# Patient Record
Sex: FEMALE | Race: WHITE | NOT HISPANIC OR LATINO | Employment: OTHER | URBAN - METROPOLITAN AREA
[De-identification: names, ages, dates, MRNs, and addresses within clinical notes are randomized per-mention and may not be internally consistent; named-entity substitution may affect disease eponyms.]

---

## 2017-01-10 ENCOUNTER — HOSPITAL ENCOUNTER (OUTPATIENT)
Dept: LAB | Facility: MEDICAL CENTER | Age: 62
End: 2017-01-10
Attending: FAMILY MEDICINE
Payer: COMMERCIAL

## 2017-01-10 LAB — TSH SERPL DL<=0.005 MIU/L-ACNC: 1.25 UIU/ML (ref 0.3–3.7)

## 2017-01-10 PROCEDURE — 84443 ASSAY THYROID STIM HORMONE: CPT

## 2017-01-10 PROCEDURE — 36415 COLL VENOUS BLD VENIPUNCTURE: CPT

## 2017-02-03 ENCOUNTER — HOSPITAL ENCOUNTER (OUTPATIENT)
Dept: LAB | Facility: MEDICAL CENTER | Age: 62
End: 2017-02-03
Attending: FAMILY MEDICINE
Payer: COMMERCIAL

## 2017-02-03 LAB
ANION GAP SERPL CALC-SCNC: 7 MMOL/L (ref 0–11.9)
BUN SERPL-MCNC: 11 MG/DL (ref 8–22)
CALCIUM SERPL-MCNC: 9.7 MG/DL (ref 8.5–10.5)
CHLORIDE SERPL-SCNC: 99 MMOL/L (ref 96–112)
CO2 SERPL-SCNC: 30 MMOL/L (ref 20–33)
CREAT SERPL-MCNC: 0.61 MG/DL (ref 0.5–1.4)
GLUCOSE SERPL-MCNC: 93 MG/DL (ref 65–99)
POTASSIUM SERPL-SCNC: 4 MMOL/L (ref 3.6–5.5)
SODIUM SERPL-SCNC: 136 MMOL/L (ref 135–145)

## 2017-02-03 PROCEDURE — 36415 COLL VENOUS BLD VENIPUNCTURE: CPT

## 2017-02-03 PROCEDURE — 80048 BASIC METABOLIC PNL TOTAL CA: CPT

## 2017-02-13 ENCOUNTER — HOSPITAL ENCOUNTER (OUTPATIENT)
Dept: RADIOLOGY | Facility: MEDICAL CENTER | Age: 62
End: 2017-02-13
Attending: OBSTETRICS & GYNECOLOGY
Payer: COMMERCIAL

## 2017-02-13 DIAGNOSIS — N63.0 LUMP OR MASS IN BREAST: ICD-10-CM

## 2017-02-13 PROCEDURE — 76642 ULTRASOUND BREAST LIMITED: CPT | Mod: RT

## 2017-06-15 ENCOUNTER — SLEEP CENTER VISIT (OUTPATIENT)
Dept: SLEEP MEDICINE | Facility: MEDICAL CENTER | Age: 62
End: 2017-06-15
Payer: COMMERCIAL

## 2017-06-15 VITALS
WEIGHT: 278 LBS | OXYGEN SATURATION: 93 % | HEIGHT: 66 IN | DIASTOLIC BLOOD PRESSURE: 82 MMHG | BODY MASS INDEX: 44.68 KG/M2 | SYSTOLIC BLOOD PRESSURE: 120 MMHG | HEART RATE: 68 BPM | RESPIRATION RATE: 16 BRPM

## 2017-06-15 DIAGNOSIS — E66.9 OBESITY, UNSPECIFIED OBESITY SEVERITY, UNSPECIFIED OBESITY TYPE: ICD-10-CM

## 2017-06-15 DIAGNOSIS — G47.30 SLEEP APNEA, UNSPECIFIED TYPE: ICD-10-CM

## 2017-06-15 PROCEDURE — 99213 OFFICE O/P EST LOW 20 MIN: CPT | Performed by: INTERNAL MEDICINE

## 2017-06-15 NOTE — PATIENT INSTRUCTIONS
PROBLEMS:  1. Sleep apnea, unspecified type     2. Obesity, unspecified obesity severity, unspecified obesity type           PLAN:   Has been advised to continue the current CPAP at 14 cm therapy, clean equipment frequently, and get new mask and supplies as allowed by insurance and DME. Advised about potential problems including nasal obstruction/stuffiness, mask leak or discomfort , frequent awakenings, chill or dryness of the upper airway, noise, inconvenience, and lack of benefit. Recommend an earlier appointment, if significant treatment barriers develop.     Return in about 1 year (around 6/15/2018).

## 2017-06-15 NOTE — MR AVS SNAPSHOT
"        Adelaide Pereira   6/15/2017 10:00 AM   Sleep Center Visit   MRN: [id number]    Department:  [facility]   Dept Phone:  [phone]    Description:  Female : 1955   Provider:  Louis Sosa M.D.           Reason for Visit     Follow-Up RAISA      Allergies as of 6/15/2017     Allergen Noted Reactions    Maxepa 2010       Peanut Oil 2010         You were diagnosed with     Sleep apnea, unspecified type   [5078481]       Obesity, unspecified obesity severity, unspecified obesity type   [9588529]         Vital Signs     Blood Pressure Pulse Respirations Height Weight Body Mass Index    120/82 mmHg 68 16 1.676 m (5' 5.98\") 126.1 kg (278 lb) 44.89 kg/m2    Oxygen Saturation Smoking Status                93% Never Smoker           Basic Information     Date Of Birth Sex Race Ethnicity Preferred Language    1955 Female Unknown Unknown English      Problem List              ICD-10-CM Priority Class Noted - Resolved    Hypertension I10   2010 - Present    Hypothyroidism E03.9   2010 - Present    Obesity E66.9   Unknown - Present    Goiter E04.9   Unknown - Present    Sleep apnea G47.30   Unknown - Present    Vitamin d deficiency    2012 - Present    thyroid nodules E04.9   Unknown - Present    Chronic thyroiditis E06.5   2013 - Present      Health Maintenance        Date Due Completion Dates    IMM DTaP/Tdap/Td Vaccine (1 - Tdap) 1972 ---    PAP SMEAR 1974 ---    COLONOSCOPY 2003 ---    IMM ZOSTER VACCINE 2013 ---    MAMMOGRAM 2017, 2016            Current Immunizations     No immunizations on file.      Below and/or attached are the medications your provider expects you to take. Review all of your home medications and newly ordered medications with your provider and/or pharmacist. Follow medication instructions as directed by your provider and/or pharmacist. Please keep your medication list with you and share with your provider. " Update the information when medications are discontinued, doses are changed, or new medications (including over-the-counter products) are added; and carry medication information at all times in the event of emergency situations     Allergies:  MAXEPA - (reactions not documented)     PEANUT OIL - (reactions not documented)               Medications  Valid as of: Rosy 15, 2017 - 10:14 AM    Generic Name Brand Name Tablet Size Instructions for use    EPINEPHrine   0.3 mg by Injection route.        Levothyroxine Sodium (Tab) SYNTHROID 125 MCG TAKE 1 TABLET BY MOUTH EVERY DAY        Liothyronine Sodium (Tab) CYTOMEL 5 MCG Take 1 Tab by mouth 2 Times a Day.        Progesterone Micronized (Cap) PROMETRIUM 100 MG Take 100 mg by mouth every day.          Triamterene-HCTZ (Tab) MAXZIDE 75-50 75-50 MG TAKE 1/2 TABLET BY MOUTH EVERY DAY        .                 Medicines prescribed today were sent to:     [facility] [address] [place] [address], [place] - [address] [facility]    [address] [facility] [address]    Phone: [phone] Fax: [phone]    Open 24 Hours?: No      Medication refill instructions:       If your prescription bottle indicates you have medication refills left, it is not necessary to call your provider’s office. Please contact your pharmacy and they will refill your medication.    If your prescription bottle indicates you do not have any refills left, you may request refills at any time through one of the following ways: The online Within3 system (except Urgent Care), by calling your provider’s office, or by asking your pharmacy to contact your provider’s office with a refill request. Medication refills are processed only during regular business hours and may not be available until the next business day. Your provider may request additional information or to have a follow-up visit with you prior to refilling your medication.   *Please Note: Medication refills are assigned a new Rx  number when refilled electronically. Your pharmacy may indicate that no refills were authorized even though a new prescription for the same medication is available at the pharmacy. Please request the medicine by name with the pharmacy before contacting your provider for a refill.        Instructions    PROBLEMS:  1. Sleep apnea, unspecified type     2. Obesity, unspecified obesity severity, unspecified obesity type           PLAN:   Has been advised to continue the current CPAP at 14 cm therapy, clean equipment frequently, and get new mask and supplies as allowed by insurance and DME. Advised about potential problems including nasal obstruction/stuffiness, mask leak or discomfort , frequent awakenings, chill or dryness of the upper airway, noise, inconvenience, and lack of benefit. Recommend an earlier appointment, if significant treatment barriers develop.     Return in about 1 year (around 6/15/2018).           Other Notes About Your Plan     DME:  Elan Kimble ph [phone] / fax [phone]             MyChart Access Code: Activation code not generated  Current CENTERSONIC Status: Active

## 2017-06-15 NOTE — PROGRESS NOTES
"CC: Routine follow-up for sleep apnea I'm not    HPI: 61-year-old woman returns for a compliance review. Her card from 5/16/2017 until 6/14/2017 was reviewed. Her usage was 100% of days for greater than 4 hours with an average usage of 8 hours and 7 minutes. She is currently on CPAP at 14 cm. Her residual average AHI 0.1. Her 95th percentile leak was 28 L/m. Having no issues with the mask or pressure.    Since retiring, has become a master . \"Lives\" at the gym but hasn't been able to lose weight. Dr. 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    Her prior sleep study performed October 17, 2011 showed an AHI of 36.9, REM index of 70, and a low saturation of 62%. On CPAP at 14 cm patient's apnea hypopnea index was zero, the minimum saturation 91%, and the mean saturation 92.6%.    She was last seen in June 2016 and at that time her usage was quite similar. She was on 14 centimeters water pressure at that time but her old machine did not provide a residual apnea hypopnea index. 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 Otherwise she's been doing quite well healthwise.    Patient Active Problem List    Diagnosis Date Noted   • Chronic thyroiditis 02/12/2013   • thyroid nodules    • Vitamin d deficiency 02/16/2012   • Sleep apnea    • Hypertension 02/22/2010   • Hypothyroidism 02/22/2010   • Obesity    • Goiter        Past Medical History   Diagnosis Date   • Hypothyroidism    • Hyperlipidemia    • thyroid nodules 2000     biopsy Hashimoto's thyroiditis / TPO ab neg   • Obesity    • Sleep apnea 2011     on CPAP   • Chickenpox    • Mumps    • Influenza    • Swedish measles         History reviewed. No pertinent past surgical history.    Family History   Problem Relation Age of Onset   • Thyroid Mother    • Heart Attack Father    • Sleep Apnea Father    • Sleep Apnea Brother        Social History     Social History   • Marital Status:      Spouse Name: N/A   • Number of Children: N/A   • " "Years of Education: N/A     Occupational History   • Not on file.     Social History Main Topics   • Smoking status: Never Smoker    • Smokeless tobacco: Never Used   • Alcohol Use: Yes   • Drug Use: No   • Sexual Activity: Not on file     Other Topics Concern   • Not on file     Social History Narrative       Current Outpatient Prescriptions   Medication Sig Dispense Refill   • SYNTHROID 125 MCG TABS TAKE 1 TABLET BY MOUTH EVERY DAY 30 Tab 6   • triamterene-hydrochlorothiazide (MAXZIDE 75-50) 75-50 MG TABS TAKE 1/2 TABLET BY MOUTH EVERY DAY 45 Tab 6   • progesterone (PROMETRIUM) 100 MG CAPS Take 100 mg by mouth every day.       • liothyronine (CYTOMEL) 5 MCG TABS Take 1 Tab by mouth 2 Times a Day. 60 Tab 3   • EPINEPHrine (EPIPEN INJ) 0.3 mg by Injection route.       No current facility-administered medications for this visit.    \"CURRENT RX\"    ALLERGIES: Maxepa and Peanut oil    ROS  Per history of present illness otherwise negative    PHYSICAL EXAM  Medically significant excessive weight noted.  /82 mmHg  Pulse 68  Resp 16  Ht 1.676 m (5' 5.98\")  Wt 126.1 kg (278 lb)  BMI 44.89 kg/m2  SpO2 93%  Appearance: Well-nourished, well-developed, no acute distress  Eyes:  PERRLA, EOMI  Hearing:  Grossly intact  Nose:  Normal, no lesions or deformities, turbinates moist  Oropharynx:  Tongue normal, posterior pharynx without erythema or exudate  Mallampati classification:    Neck: Supple, trachea midline, no masses  Respiratory effort:  No intercostal retractions or use of accessory muscles  Lung auscultation:  No wheezes rhonchi rubs or rales  Cardiac auscultation:  No murmurs, rubs, or gallops, no regular rhythm, normal rate  Abdomen:  No tenderness, no organomegaly  Extremities:  No cyanosis, clubbing, trace edema  Gait and Station:  Normal  Digits and nails: No clubbing, cyanosis, petechiae, or nodes  Musculoskeletal:  Grossly normal  Skin:  No rashes  Orientation:  Oriented time, place, and person  Mood " and affect:  No depression, anxiety, agitation  Judgment:  Intact    PROBLEMS:  1. Sleep apnea, unspecified type     2. Obesity, unspecified obesity severity, unspecified obesity type       3. Hypertension  4. Hypothyroidism, hx Hashimoto's Thyroiditis    PLAN:   Has been advised to continue the current CPAP at 14 cm therapy, clean equipment frequently, and get new mask and supplies as allowed by insurance and DME. Advised about potential problems including nasal obstruction/stuffiness, mask leak or discomfort , frequent awakenings, chill or dryness of the upper airway, noise, inconvenience, and lack of benefit. Recommend an earlier appointment, if significant treatment barriers develop.     Return in about 1 year (around 6/15/2018).

## 2017-06-24 ENCOUNTER — HOSPITAL ENCOUNTER (OUTPATIENT)
Facility: MEDICAL CENTER | Age: 62
End: 2017-06-24
Attending: FAMILY MEDICINE
Payer: COMMERCIAL

## 2017-06-24 ENCOUNTER — OFFICE VISIT (OUTPATIENT)
Dept: URGENT CARE | Facility: PHYSICIAN GROUP | Age: 62
End: 2017-06-24
Payer: COMMERCIAL

## 2017-06-24 VITALS
SYSTOLIC BLOOD PRESSURE: 142 MMHG | HEART RATE: 82 BPM | TEMPERATURE: 97.2 F | HEIGHT: 65 IN | WEIGHT: 288.4 LBS | OXYGEN SATURATION: 98 % | DIASTOLIC BLOOD PRESSURE: 88 MMHG | RESPIRATION RATE: 12 BRPM | BODY MASS INDEX: 48.05 KG/M2

## 2017-06-24 DIAGNOSIS — N30.01 ACUTE CYSTITIS WITH HEMATURIA: ICD-10-CM

## 2017-06-24 LAB
APPEARANCE UR: NORMAL
BILIRUB UR STRIP-MCNC: NORMAL MG/DL
COLOR UR AUTO: NORMAL
GLUCOSE UR STRIP.AUTO-MCNC: NORMAL MG/DL
KETONES UR STRIP.AUTO-MCNC: NORMAL MG/DL
LEUKOCYTE ESTERASE UR QL STRIP.AUTO: NORMAL
NITRITE UR QL STRIP.AUTO: NORMAL
PH UR STRIP.AUTO: 7 [PH] (ref 5–8)
PROT UR QL STRIP: 30 MG/DL
RBC UR QL AUTO: NORMAL
SP GR UR STRIP.AUTO: 1
UROBILINOGEN UR STRIP-MCNC: NORMAL MG/DL

## 2017-06-24 PROCEDURE — 99204 OFFICE O/P NEW MOD 45 MIN: CPT | Performed by: FAMILY MEDICINE

## 2017-06-24 PROCEDURE — 81002 URINALYSIS NONAUTO W/O SCOPE: CPT | Performed by: FAMILY MEDICINE

## 2017-06-24 PROCEDURE — 87086 URINE CULTURE/COLONY COUNT: CPT

## 2017-06-24 RX ORDER — HYDROXYZINE PAMOATE 25 MG/1
CAPSULE ORAL
Refills: 11 | COMMUNITY
Start: 2017-05-12 | End: 2020-06-22

## 2017-06-24 RX ORDER — PHENAZOPYRIDINE HYDROCHLORIDE 100 MG/1
100 TABLET, FILM COATED ORAL 3 TIMES DAILY PRN
Qty: 6 TAB | Refills: 0 | Status: SHIPPED | OUTPATIENT
Start: 2017-06-24 | End: 2020-06-22

## 2017-06-24 RX ORDER — TRIAMTERENE AND HYDROCHLOROTHIAZIDE 37.5; 25 MG/1; MG/1
TABLET ORAL DAILY
Refills: 0 | COMMUNITY
Start: 2017-05-30 | End: 2021-03-03 | Stop reason: SDUPTHER

## 2017-06-24 RX ORDER — CEFDINIR 300 MG/1
300 CAPSULE ORAL EVERY 12 HOURS
Qty: 10 CAP | Refills: 0 | Status: SHIPPED | OUTPATIENT
Start: 2017-06-24 | End: 2017-06-29

## 2017-06-24 ASSESSMENT — PAIN SCALES - GENERAL: PAINLEVEL: NO PAIN

## 2017-06-24 NOTE — MR AVS SNAPSHOT
"        Adelaide Randall   2017 4:45 PM   Office Visit   MRN: [id number]    Department:  [facility]   Dept Phone:  [phone]    Description:  Female : 1955   Provider:  Carlos Pitt M.D.           Reason for Visit     Dysuria x 1 day / Frequent urination      Allergies as of 2017     Allergen Noted Reactions    Aspirin 2017       Codeine 2017       Fish 2017       Maxepa 2010       Peanut Oil 2010         You were diagnosed with     Acute cystitis with hematuria   [483999]         Vital Signs     Blood Pressure Pulse Temperature Respirations Height Weight    142/88 mmHg 82 36.2 °C (97.2 °F) 12 1.651 m (5' 5\") 130.817 kg (288 lb 6.4 oz)    Body Mass Index Oxygen Saturation Smoking Status             47.99 kg/m2 98% Never Smoker          Basic Information     Date Of Birth Sex Race Ethnicity Preferred Language    1955 Female Unknown Unknown English      Problem List              ICD-10-CM Priority Class Noted - Resolved    Hypertension I10   2010 - Present    Hypothyroidism E03.9   2010 - Present    Obesity E66.9   Unknown - Present    Goiter E04.9   Unknown - Present    Sleep apnea G47.30   Unknown - Present    Vitamin d deficiency    2012 - Present    thyroid nodules E04.9   Unknown - Present    Chronic thyroiditis E06.5   2013 - Present      Health Maintenance        Date Due Completion Dates    IMM DTaP/Tdap/Td Vaccine (1 - Tdap) 1972 ---    PAP SMEAR 1974 ---    COLONOSCOPY 2003 ---    IMM ZOSTER VACCINE 2013 ---    MAMMOGRAM 2017, 2016            Current Immunizations     No immunizations on file.      Below and/or attached are the medications your provider expects you to take. Review all of your home medications and newly ordered medications with your provider and/or pharmacist. Follow medication instructions as directed by your provider and/or pharmacist. Please keep your medication list " with you and share with your provider. Update the information when medications are discontinued, doses are changed, or new medications (including over-the-counter products) are added; and carry medication information at all times in the event of emergency situations     Allergies:  ASPIRIN - (reactions not documented)     CODEINE - (reactions not documented)     FISH - (reactions not documented)     MAXEPA - (reactions not documented)     PEANUT OIL - (reactions not documented)               Medications  Valid as of: June 24, 2017 -  5:08 PM    Generic Name Brand Name Tablet Size Instructions for use    Cefdinir (Cap) OMNICEF 300 MG Take 1 Cap by mouth every 12 hours for 5 days.        EPINEPHrine   0.3 mg by Injection route.        HydrOXYzine Pamoate (Cap) VISTARIL 25 MG         Levothyroxine Sodium (Tab) SYNTHROID 125 MCG TAKE 1 TABLET BY MOUTH EVERY DAY        Liothyronine Sodium (Tab) CYTOMEL 5 MCG Take 1 Tab by mouth 2 Times a Day.        Phenazopyridine HCl (Tab) PYRIDIUM 100 MG Take 1 Tab by mouth 3 times a day as needed.        Progesterone Micronized (Cap) PROMETRIUM 100 MG Take 100 mg by mouth every day.          Triamterene-HCTZ (Tab) MAXZIDE 75-50 75-50 MG TAKE 1/2 TABLET BY MOUTH EVERY DAY        Triamterene-HCTZ (Tab) MAXZIDE-25/DYAZIDE 37.5-25 MG         .                 Medicines prescribed today were sent to:     [facility] [address] [place] [address] AT [facility] & [address] [address]    Phone: [phone] Fax: [phone]    Open 24 Hours?: No      Medication refill instructions:       If your prescription bottle indicates you have medication refills left, it is not necessary to call your provider’s office. Please contact your pharmacy and they will refill your medication.    If your prescription bottle indicates you do not have any refills left, you may request refills at any time through one of the following ways: The online GOSO system  (except Urgent Care), by calling your provider’s office, or by asking your pharmacy to contact your provider’s office with a refill request. Medication refills are processed only during regular business hours and may not be available until the next business day. Your provider may request additional information or to have a follow-up visit with you prior to refilling your medication.   *Please Note: Medication refills are assigned a new Rx number when refilled electronically. Your pharmacy may indicate that no refills were authorized even though a new prescription for the same medication is available at the pharmacy. Please request the medicine by name with the pharmacy before contacting your provider for a refill.        Your To Do List     Future Labs/Procedures Complete By Expires    Urine Culture  As directed 6/24/2018      Instructions      Urinary Tract Infection  A urinary tract infection (UTI) can occur any place along the urinary tract. The tract includes the kidneys, ureters, bladder, and urethra. A type of germ called bacteria often causes a UTI. UTIs are often helped with antibiotic medicine.   HOME CARE   · If given, take antibiotics as told by your doctor. Finish them even if you start to feel better.  · Drink enough fluids to keep your pee (urine) clear or pale yellow.  · Avoid tea, drinks with caffeine, and bubbly (carbonated) drinks.  · Pee often. Avoid holding your pee in for a long time.  · Pee before and after having sex (intercourse).  · Wipe from front to back after you poop (bowel movement) if you are a woman. Use each tissue only once.  GET HELP RIGHT AWAY IF:   · You have back pain.  · You have lower belly (abdominal) pain.  · You have chills.  · You feel sick to your stomach (nauseous).  · You throw up (vomit).  · Your burning or discomfort with peeing does not go away.  · You have a fever.  · Your symptoms are not better in 3 days.  MAKE SURE YOU:   · Understand these instructions.  · Will  watch your condition.  · Will get help right away if you are not doing well or get worse.     This information is not intended to replace advice given to you by your health care provider. Make sure you discuss any questions you have with your health care provider.     Document Released: 06/05/2009 Document Revised: 01/08/2016 Document Reviewed: 07/18/2013  [employer] Interactive Patient Education ©2016 [employer]         Other Notes About Your Plan     DME:  Elan  / deng [phone] / fax [phone]             MyChart Access Code: Activation code not generated  Current mNectar Status: Active

## 2017-06-24 NOTE — PATIENT INSTRUCTIONS
Urinary Tract Infection  A urinary tract infection (UTI) can occur any place along the urinary tract. The tract includes the kidneys, ureters, bladder, and urethra. A type of germ called bacteria often causes a UTI. UTIs are often helped with antibiotic medicine.   HOME CARE   · If given, take antibiotics as told by your doctor. Finish them even if you start to feel better.  · Drink enough fluids to keep your pee (urine) clear or pale yellow.  · Avoid tea, drinks with caffeine, and bubbly (carbonated) drinks.  · Pee often. Avoid holding your pee in for a long time.  · Pee before and after having sex (intercourse).  · Wipe from front to back after you poop (bowel movement) if you are a woman. Use each tissue only once.  GET HELP RIGHT AWAY IF:   · You have back pain.  · You have lower belly (abdominal) pain.  · You have chills.  · You feel sick to your stomach (nauseous).  · You throw up (vomit).  · Your burning or discomfort with peeing does not go away.  · You have a fever.  · Your symptoms are not better in 3 days.  MAKE SURE YOU:   · Understand these instructions.  · Will watch your condition.  · Will get help right away if you are not doing well or get worse.     This information is not intended to replace advice given to you by your health care provider. Make sure you discuss any questions you have with your health care provider.     Document Released: 06/05/2009 Document Revised: 01/08/2016 Document Reviewed: 07/18/2013  [employer] Interactive Patient Education ©2016 [employer]

## 2017-06-26 DIAGNOSIS — N30.01 ACUTE CYSTITIS WITH HEMATURIA: ICD-10-CM

## 2017-06-28 LAB
BACTERIA UR CULT: NORMAL
SIGNIFICANT IND 70042: NORMAL
SOURCE SOURCE: NORMAL

## 2017-07-05 ENCOUNTER — HOSPITAL ENCOUNTER (OUTPATIENT)
Dept: LAB | Facility: MEDICAL CENTER | Age: 62
End: 2017-07-05
Attending: PHYSICIAN ASSISTANT
Payer: COMMERCIAL

## 2017-07-05 ENCOUNTER — HOSPITAL ENCOUNTER (OUTPATIENT)
Facility: MEDICAL CENTER | Age: 62
End: 2017-07-05
Attending: NURSE PRACTITIONER
Payer: COMMERCIAL

## 2017-07-05 ENCOUNTER — OFFICE VISIT (OUTPATIENT)
Dept: URGENT CARE | Facility: PHYSICIAN GROUP | Age: 62
End: 2017-07-05
Payer: COMMERCIAL

## 2017-07-05 VITALS
WEIGHT: 278 LBS | BODY MASS INDEX: 44.68 KG/M2 | TEMPERATURE: 98.1 F | SYSTOLIC BLOOD PRESSURE: 138 MMHG | HEART RATE: 74 BPM | HEIGHT: 66 IN | OXYGEN SATURATION: 94 % | DIASTOLIC BLOOD PRESSURE: 92 MMHG

## 2017-07-05 DIAGNOSIS — R35.0 URINARY FREQUENCY: ICD-10-CM

## 2017-07-05 DIAGNOSIS — R30.0 DYSURIA: ICD-10-CM

## 2017-07-05 DIAGNOSIS — N30.01 ACUTE CYSTITIS WITH HEMATURIA: ICD-10-CM

## 2017-07-05 LAB
ALBUMIN SERPL BCP-MCNC: 3.9 G/DL (ref 3.2–4.9)
ALBUMIN/GLOB SERPL: 1.1 G/DL
ALP SERPL-CCNC: 86 U/L (ref 30–99)
ALT SERPL-CCNC: 22 U/L (ref 2–50)
ANION GAP SERPL CALC-SCNC: 10 MMOL/L (ref 0–11.9)
APPEARANCE UR: CLEAR
AST SERPL-CCNC: 19 U/L (ref 12–45)
BILIRUB SERPL-MCNC: 0.8 MG/DL (ref 0.1–1.5)
BILIRUB UR STRIP-MCNC: NORMAL MG/DL
BUN SERPL-MCNC: 8 MG/DL (ref 8–22)
CALCIUM SERPL-MCNC: 9.3 MG/DL (ref 8.5–10.5)
CHLORIDE SERPL-SCNC: 99 MMOL/L (ref 96–112)
CO2 SERPL-SCNC: 27 MMOL/L (ref 20–33)
COLOR UR AUTO: YELLOW
CREAT SERPL-MCNC: 0.49 MG/DL (ref 0.5–1.4)
GFR SERPL CREATININE-BSD FRML MDRD: >60 ML/MIN/1.73 M 2
GLOBULIN SER CALC-MCNC: 3.6 G/DL (ref 1.9–3.5)
GLUCOSE SERPL-MCNC: 77 MG/DL (ref 65–99)
GLUCOSE UR STRIP.AUTO-MCNC: NORMAL MG/DL
KETONES UR STRIP.AUTO-MCNC: NORMAL MG/DL
LEUKOCYTE ESTERASE UR QL STRIP.AUTO: NORMAL
NITRITE UR QL STRIP.AUTO: NORMAL
PH UR STRIP.AUTO: 7 [PH] (ref 5–8)
POTASSIUM SERPL-SCNC: 3.8 MMOL/L (ref 3.6–5.5)
PROT SERPL-MCNC: 7.5 G/DL (ref 6–8.2)
PROT UR QL STRIP: NORMAL MG/DL
RBC UR QL AUTO: NORMAL
SODIUM SERPL-SCNC: 136 MMOL/L (ref 135–145)
SP GR UR STRIP.AUTO: 1
TSH SERPL DL<=0.005 MIU/L-ACNC: 1.09 UIU/ML (ref 0.3–3.7)
UROBILINOGEN UR STRIP-MCNC: NORMAL MG/DL

## 2017-07-05 PROCEDURE — 87186 SC STD MICRODIL/AGAR DIL: CPT

## 2017-07-05 PROCEDURE — 87086 URINE CULTURE/COLONY COUNT: CPT

## 2017-07-05 PROCEDURE — 81002 URINALYSIS NONAUTO W/O SCOPE: CPT | Performed by: NURSE PRACTITIONER

## 2017-07-05 PROCEDURE — 99214 OFFICE O/P EST MOD 30 MIN: CPT | Performed by: NURSE PRACTITIONER

## 2017-07-05 PROCEDURE — 84443 ASSAY THYROID STIM HORMONE: CPT

## 2017-07-05 PROCEDURE — 36415 COLL VENOUS BLD VENIPUNCTURE: CPT

## 2017-07-05 PROCEDURE — 80053 COMPREHEN METABOLIC PANEL: CPT

## 2017-07-05 PROCEDURE — 87077 CULTURE AEROBIC IDENTIFY: CPT

## 2017-07-05 RX ORDER — NITROFURANTOIN 25; 75 MG/1; MG/1
100 CAPSULE ORAL EVERY 12 HOURS
Qty: 10 CAP | Refills: 0 | Status: SHIPPED | OUTPATIENT
Start: 2017-07-05 | End: 2017-07-10

## 2017-07-05 ASSESSMENT — ENCOUNTER SYMPTOMS
CHILLS: 0
VOMITING: 0
FEVER: 0
PSYCHIATRIC NEGATIVE: 1
NAUSEA: 0
FLANK PAIN: 0
NEUROLOGICAL NEGATIVE: 1
MUSCULOSKELETAL NEGATIVE: 1

## 2017-07-05 NOTE — MR AVS SNAPSHOT
"        Adelaide Pereira   2017 8:15 AM   Office Visit   MRN: [id number]    Department:  [facility]   Dept Phone:  [phone]    Description:  Female : 1955   Provider:  Cathey J Hamman, A.P.N.           Reason for Visit     Dysuria x 5 days       Allergies as of 2017     Allergen Noted Reactions    Aspirin 2017       Codeine 2017       Fish 2017       Maxepa 2010       Peanut Oil 2010         You were diagnosed with     Acute cystitis with hematuria   [755374]       Dysuria   [788.1.ICD-9-CM]       Urinary frequency   [788.41.ICD-9-CM]         Vital Signs     Blood Pressure Pulse Temperature Height Weight Body Mass Index    138/92 mmHg 74 36.7 °C (98.1 °F) 1.676 m (5' 6\") 126.1 kg (278 lb) 44.89 kg/m2    Oxygen Saturation Smoking Status                94% Never Smoker           Basic Information     Date Of Birth Sex Race Ethnicity Preferred Language    1955 Female Unknown Unknown English      Problem List              ICD-10-CM Priority Class Noted - Resolved    Hypertension I10   2010 - Present    Hypothyroidism E03.9   2010 - Present    Obesity E66.9   Unknown - Present    Goiter E04.9   Unknown - Present    Sleep apnea G47.30   Unknown - Present    Vitamin d deficiency    2012 - Present    thyroid nodules E04.9   Unknown - Present    Chronic thyroiditis E06.5   2013 - Present      Health Maintenance        Date Due Completion Dates    IMM DTaP/Tdap/Td Vaccine (1 - Tdap) 1972 ---    PAP SMEAR 1974 ---    COLONOSCOPY 2003 ---    IMM ZOSTER VACCINE 2013 ---    MAMMOGRAM 2017, 2016    IMM INFLUENZA (1) 2017 ---            Results     POCT Urinalysis      Component Value Standard Range & Units    POC Color YELLOW Negative    POC Appearance CLEAR Negative    POC Leukocyte Esterase LARGE Negative    POC Nitrites NEG Negative    POC Urobiligen NEG Negative (0.2) mg/dL    POC Protein NEG Negative mg/dL   "    POC Urine PH 7.0 5.0 - 8.0    POC Blood HEMOLYZED Negative    POC Specific Gravity 1.005 <1.005 - >1.030    POC Ketones NEG Negative mg/dL    POC Biliruben NEG Negative mg/dL    POC Glucose NEG Negative mg/dL                        Current Immunizations     No immunizations on file.      Below and/or attached are the medications your provider expects you to take. Review all of your home medications and newly ordered medications with your provider and/or pharmacist. Follow medication instructions as directed by your provider and/or pharmacist. Please keep your medication list with you and share with your provider. Update the information when medications are discontinued, doses are changed, or new medications (including over-the-counter products) are added; and carry medication information at all times in the event of emergency situations     Allergies:  ASPIRIN - (reactions not documented)     CODEINE - (reactions not documented)     FISH - (reactions not documented)     MAXEPA - (reactions not documented)     PEANUT OIL - (reactions not documented)               Medications  Valid as of: July 05, 2017 -  9:58 AM    Generic Name Brand Name Tablet Size Instructions for use    EPINEPHrine   0.3 mg by Injection route.        HydrOXYzine Pamoate (Cap) VISTARIL 25 MG         Levothyroxine Sodium (Tab) SYNTHROID 125 MCG TAKE 1 TABLET BY MOUTH EVERY DAY        Liothyronine Sodium (Tab) CYTOMEL 5 MCG Take 1 Tab by mouth 2 Times a Day.        Nitrofurantoin Monohyd Macro (Cap) MACROBID 100 MG Take 1 Cap by mouth every 12 hours for 5 days.        Phenazopyridine HCl (Tab) PYRIDIUM 100 MG Take 1 Tab by mouth 3 times a day as needed.        Progesterone Micronized (Cap) PROMETRIUM 100 MG Take 100 mg by mouth every day.          Triamterene-HCTZ (Tab) MAXZIDE 75-50 75-50 MG TAKE 1/2 TABLET BY MOUTH EVERY DAY        Triamterene-HCTZ (Tab) MAXZIDE-25/DYAZIDE 37.5-25 MG         .                 Medicines prescribed today were  sent to:     [facility] - [place], [place] - [address] AT [facility] & [address]    [address] [place] [address]    Phone: [phone] Fax: [phone]    Open 24 Hours?: No      Medication refill instructions:       If your prescription bottle indicates you have medication refills left, it is not necessary to call your provider’s office. Please contact your pharmacy and they will refill your medication.    If your prescription bottle indicates you do not have any refills left, you may request refills at any time through one of the following ways: The online SnackFeed system (except Urgent Care), by calling your provider’s office, or by asking your pharmacy to contact your provider’s office with a refill request. Medication refills are processed only during regular business hours and may not be available until the next business day. Your provider may request additional information or to have a follow-up visit with you prior to refilling your medication.   *Please Note: Medication refills are assigned a new Rx number when refilled electronically. Your pharmacy may indicate that no refills were authorized even though a new prescription for the same medication is available at the pharmacy. Please request the medicine by name with the pharmacy before contacting your provider for a refill.        Your To Do List     Future Labs/Procedures Complete By Expires    Urine Culture  As directed 7/5/2018      Other Notes About Your Plan     DME:  Elan  / deng [phone] / fax [phone]             Pelotonicshart Access Code: Activation code not generated  Current SnackFeed Status: Active

## 2017-07-05 NOTE — PROGRESS NOTES
Subjective:      Adelaide Pereira is a 61 y.o. female who presents with Dysuria    Past Medical History   Diagnosis Date   • Hypothyroidism    • Hyperlipidemia    • thyroid nodules 2000     biopsy Hashimoto's thyroiditis / TPO ab neg   • Obesity    • Sleep apnea 2011     on CPAP   • Chickenpox    • Mumps    • Influenza    • [place] measles      Social History     Social History   • Marital Status:      Spouse Name: N/A   • Number of Children: N/A   • Years of Education: N/A     Occupational History   • Not on file.     Social History Main Topics   • Smoking status: Never Smoker    • Smokeless tobacco: Never Used   • Alcohol Use: Yes   • Drug Use: No   • Sexual Activity: Not on file     Other Topics Concern   • Not on file     Social History Narrative     Family History   Problem Relation Age of Onset   • Thyroid Mother    • Heart Attack Father    • Sleep Apnea Father    • Sleep Apnea Brother      Allergies: Aspirin; Codeine; Fish; Maxepa; and Peanut oil     The patient is a 60-year-old female who presents today with complaint of dysuria, urgency, and frequency. She was seen in urgent care on June 24 with the same symptoms. She was treated with Omnicef. Her urine culture was negative. She did report responding to the antibiotic however her symptoms returned over the last few days. She denies any lower back pain, fever, aches, or chills.        Dysuria   This is a recurrent problem. The current episode started in the past 7 days. The problem occurs every urination. The problem has been gradually worsening. The quality of the pain is described as shooting, burning and aching. The pain is mild. There has been no fever. Associated symptoms include frequency, hematuria and urgency. Pertinent negatives include no chills, discharge, flank pain, nausea or vomiting. She has tried nothing for the symptoms. The treatment provided no relief.       Review of Systems   Constitutional: Negative for fever, chills and  "malaise/fatigue.   Gastrointestinal: Negative for nausea and vomiting.   Genitourinary: Positive for dysuria, urgency, frequency and hematuria. Negative for flank pain.   Musculoskeletal: Negative.    Skin: Negative.    Neurological: Negative.    Psychiatric/Behavioral: Negative.      All other systems reviewed and are negative      Objective:     /92 mmHg  Pulse 74  Temp(Src) 36.7 °C (98.1 °F)  Ht 1.676 m (5' 6\")  Wt 126.1 kg (278 lb)  BMI 44.89 kg/m2  SpO2 94%     Physical Exam   Constitutional: She is oriented to person, place, and time. She appears well-developed and well-nourished. No distress.   Neck: Normal range of motion. Neck supple.   Cardiovascular: Normal rate and regular rhythm.    Pulmonary/Chest: Effort normal and breath sounds normal.   Abdominal: Soft. Bowel sounds are normal.   No CVA tenderness  Positive suprapubic tenderness    Musculoskeletal: Normal range of motion.   Neurological: She is alert and oriented to person, place, and time.   Skin: Skin is warm and dry. She is not diaphoretic.   Psychiatric: She has a normal mood and affect. Her behavior is normal.   Vitals reviewed.           UA: positive leukocytes, positive blood      Assessment/Plan:   Cystitis  UTI  -macrobid  -push fluids  -urine culture  -follow up if symptoms persist or worsen  -patient has follow up scheduled with her PCP and she is advised to keep that appointmetn    There are no diagnoses linked to this encounter.      "

## 2017-07-06 ASSESSMENT — ENCOUNTER SYMPTOMS
NAUSEA: 0
DIZZINESS: 0
FEVER: 0
MYALGIAS: 0
VOMITING: 0
CHILLS: 0
SHORTNESS OF BREATH: 0
EYE PAIN: 0
SORE THROAT: 0
FLANK PAIN: 0

## 2017-07-06 NOTE — PROGRESS NOTES
Subjective:      Adelaide Pereira is a 61 y.o. female who presents with Dysuria            Dysuria   This is a new problem. The current episode started yesterday. The problem occurs every urination. The problem has been rapidly worsening. The quality of the pain is described as burning. The pain is mild. Pertinent negatives include no chills, flank pain, hematuria, nausea or vomiting.       Review of Systems   Constitutional: Negative for fever and chills.   HENT: Negative for sore throat.    Eyes: Negative for pain.   Respiratory: Negative for shortness of breath.    Cardiovascular: Negative for chest pain.   Gastrointestinal: Negative for nausea and vomiting.   Genitourinary: Positive for dysuria. Negative for hematuria and flank pain.   Musculoskeletal: Negative for myalgias.   Skin: Negative for rash.   Neurological: Negative for dizziness.     PMH:  has a past medical history of Hypothyroidism; Hyperlipidemia; thyroid nodules (2000); Obesity; Sleep apnea (2011); Chickenpox; Mumps; Influenza; and Gabonese measles.  MEDS:   Current outpatient prescriptions:   •  phenazopyridine (PYRIDIUM) 100 MG Tab, Take 1 Tab by mouth 3 times a day as needed., Disp: 6 Tab, Rfl: 0  •  progesterone (PROMETRIUM) 100 MG CAPS, Take 100 mg by mouth every day.  , Disp: , Rfl:   •  nitrofurantoin monohydr macro (MACROBID) 100 MG Cap, Take 1 Cap by mouth every 12 hours for 5 days., Disp: 10 Cap, Rfl: 0  •  triamterene-hctz (MAXZIDE-25/DYAZIDE) 37.5-25 MG Tab, , Disp: , Rfl: 0  •  hydrOXYzine (VISTARIL) 25 MG Cap, , Disp: , Rfl: 11  •  SYNTHROID 125 MCG TABS, TAKE 1 TABLET BY MOUTH EVERY DAY, Disp: 30 Tab, Rfl: 6  •  liothyronine (CYTOMEL) 5 MCG TABS, Take 1 Tab by mouth 2 Times a Day., Disp: 60 Tab, Rfl: 3  •  triamterene-hydrochlorothiazide (MAXZIDE 75-50) 75-50 MG TABS, TAKE 1/2 TABLET BY MOUTH EVERY DAY, Disp: 45 Tab, Rfl: 6  •  EPINEPHrine (EPIPEN INJ), 0.3 mg by Injection route., Disp: , Rfl:   ALLERGIES:   Allergies   Allergen  "Reactions   • Aspirin    • Codeine    • Fish    • Maxepa    • Peanut Oil      SURGHX:   Past Surgical History   Procedure Laterality Date   • Primary c section       SOCHX:  reports that she has never smoked. She has never used smokeless tobacco. She reports that she drinks alcohol. She reports that she does not use illicit drugs.  FH: family history includes Heart Attack in her father; Sleep Apnea in her brother and father; Thyroid in her mother.      Objective:     /88 mmHg  Pulse 82  Temp(Src) 36.2 °C (97.2 °F)  Resp 12  Ht 1.651 m (5' 5\")  Wt 130.817 kg (288 lb 6.4 oz)  BMI 47.99 kg/m2  SpO2 98%     Physical Exam   Constitutional: She is oriented to person, place, and time. She appears well-developed and well-nourished. No distress.   HENT:   Head: Normocephalic and atraumatic.   Eyes: Conjunctivae and EOM are normal. Pupils are equal, round, and reactive to light.   Cardiovascular: Normal rate, regular rhythm, normal heart sounds and intact distal pulses.    No murmur heard.  Pulmonary/Chest: Effort normal and breath sounds normal. No respiratory distress.   Abdominal: Soft. Bowel sounds are normal. She exhibits no distension. There is tenderness in the suprapubic area. There is no CVA tenderness.   Musculoskeletal: Normal range of motion.   Neurological: She is alert and oriented to person, place, and time. She has normal reflexes. No sensory deficit.   Skin: Skin is warm and dry.   Psychiatric: She has a normal mood and affect. Her behavior is normal.   Vitals reviewed.              Assessment/Plan:     1. Acute cystitis with hematuria [N30.01]  Differential diagnosis, natural history, supportive care, and indications for immediate follow-up discussed.   - POCT Urinalysis  - Urine Culture; Future  - cefdinir (OMNICEF) 300 MG Cap; Take 1 Cap by mouth every 12 hours for 5 days.  Dispense: 10 Cap; Refill: 0  - phenazopyridine (PYRIDIUM) 100 MG Tab; Take 1 Tab by mouth 3 times a day as needed.  " Dispense: 6 Tab; Refill: 0

## 2017-07-07 LAB
BACTERIA UR CULT: ABNORMAL
BACTERIA UR CULT: ABNORMAL
SIGNIFICANT IND 70042: ABNORMAL
SOURCE SOURCE: ABNORMAL

## 2017-09-14 ENCOUNTER — HOSPITAL ENCOUNTER (OUTPATIENT)
Dept: RADIOLOGY | Facility: MEDICAL CENTER | Age: 62
End: 2017-09-14
Attending: OBSTETRICS & GYNECOLOGY
Payer: COMMERCIAL

## 2017-09-14 DIAGNOSIS — Z12.31 ENCOUNTER FOR SCREENING MAMMOGRAM FOR MALIGNANT NEOPLASM OF BREAST: ICD-10-CM

## 2017-09-14 PROCEDURE — G0202 SCR MAMMO BI INCL CAD: HCPCS

## 2017-11-10 ENCOUNTER — HOSPITAL ENCOUNTER (OUTPATIENT)
Dept: LAB | Facility: MEDICAL CENTER | Age: 62
End: 2017-11-10
Attending: NURSE PRACTITIONER
Payer: COMMERCIAL

## 2017-11-10 PROCEDURE — 87624 HPV HI-RISK TYP POOLED RSLT: CPT

## 2017-11-10 PROCEDURE — 88175 CYTOPATH C/V AUTO FLUID REDO: CPT

## 2017-11-14 LAB
CYTOLOGY REG CYTOL: NORMAL
HPV HR 12 DNA CVX QL NAA+PROBE: NEGATIVE
HPV16 DNA SPEC QL NAA+PROBE: NEGATIVE
HPV18 DNA SPEC QL NAA+PROBE: NEGATIVE
SPECIMEN SOURCE: NORMAL

## 2017-12-06 ENCOUNTER — TELEPHONE (OUTPATIENT)
Dept: SLEEP MEDICINE | Facility: MEDICAL CENTER | Age: 62
End: 2017-12-06

## 2017-12-06 DIAGNOSIS — G47.33 OSA (OBSTRUCTIVE SLEEP APNEA): ICD-10-CM

## 2017-12-06 NOTE — TELEPHONE ENCOUNTER
Pt needs updated supplies to DME:  [facility]  / deng [phone] / fax [phone]   Due back 06/2018 Please sign order

## 2018-01-03 ENCOUNTER — HOSPITAL ENCOUNTER (OUTPATIENT)
Dept: LAB | Facility: MEDICAL CENTER | Age: 63
End: 2018-01-03
Attending: FAMILY MEDICINE
Payer: COMMERCIAL

## 2018-01-03 LAB
ANION GAP SERPL CALC-SCNC: 8 MMOL/L (ref 0–11.9)
BUN SERPL-MCNC: 10 MG/DL (ref 8–22)
CALCIUM SERPL-MCNC: 9.2 MG/DL (ref 8.5–10.5)
CHLORIDE SERPL-SCNC: 99 MMOL/L (ref 96–112)
CO2 SERPL-SCNC: 26 MMOL/L (ref 20–33)
CREAT SERPL-MCNC: 0.6 MG/DL (ref 0.5–1.4)
GFR SERPL CREATININE-BSD FRML MDRD: >60 ML/MIN/1.73 M 2
GLUCOSE SERPL-MCNC: 85 MG/DL (ref 65–99)
POTASSIUM SERPL-SCNC: 4 MMOL/L (ref 3.6–5.5)
SODIUM SERPL-SCNC: 133 MMOL/L (ref 135–145)
TSH SERPL DL<=0.005 MIU/L-ACNC: 1.03 UIU/ML (ref 0.38–5.33)

## 2018-01-03 PROCEDURE — 80048 BASIC METABOLIC PNL TOTAL CA: CPT

## 2018-01-03 PROCEDURE — 36415 COLL VENOUS BLD VENIPUNCTURE: CPT

## 2018-01-03 PROCEDURE — 84443 ASSAY THYROID STIM HORMONE: CPT

## 2018-05-21 ENCOUNTER — HOSPITAL ENCOUNTER (OUTPATIENT)
Dept: RADIOLOGY | Facility: MEDICAL CENTER | Age: 63
End: 2018-05-21
Attending: FAMILY MEDICINE
Payer: COMMERCIAL

## 2018-05-21 DIAGNOSIS — E01.0 THYROMEGALY: ICD-10-CM

## 2018-05-21 PROCEDURE — 76536 US EXAM OF HEAD AND NECK: CPT

## 2018-06-28 ENCOUNTER — SLEEP CENTER VISIT (OUTPATIENT)
Dept: SLEEP MEDICINE | Facility: MEDICAL CENTER | Age: 63
End: 2018-06-28
Payer: COMMERCIAL

## 2018-06-28 VITALS
OXYGEN SATURATION: 94 % | HEART RATE: 72 BPM | DIASTOLIC BLOOD PRESSURE: 88 MMHG | SYSTOLIC BLOOD PRESSURE: 138 MMHG | BODY MASS INDEX: 43.23 KG/M2 | RESPIRATION RATE: 18 BRPM | WEIGHT: 269 LBS | HEIGHT: 66 IN

## 2018-06-28 DIAGNOSIS — E04.9 GOITER: ICD-10-CM

## 2018-06-28 DIAGNOSIS — E66.01 MORBID OBESITY WITH BMI OF 40.0-44.9, ADULT (HCC): ICD-10-CM

## 2018-06-28 DIAGNOSIS — E03.8 HYPOTHYROIDISM DUE TO HASHIMOTO'S THYROIDITIS: ICD-10-CM

## 2018-06-28 DIAGNOSIS — E06.3 HYPOTHYROIDISM DUE TO HASHIMOTO'S THYROIDITIS: ICD-10-CM

## 2018-06-28 DIAGNOSIS — Z78.9 NON-SMOKER: ICD-10-CM

## 2018-06-28 DIAGNOSIS — I10 ESSENTIAL HYPERTENSION: ICD-10-CM

## 2018-06-28 DIAGNOSIS — G47.30 SLEEP APNEA, UNSPECIFIED TYPE: ICD-10-CM

## 2018-06-28 PROCEDURE — 99214 OFFICE O/P EST MOD 30 MIN: CPT | Performed by: INTERNAL MEDICINE

## 2018-06-28 NOTE — PROGRESS NOTES
"CC: Follow up for clinical and compliance review      HPI:  Adelaide Pereira is a 61 y/o F who was last seen 6/15/17.    She will be switching to [facility] and fall and therefore does not need mask and supplies orders because she has enough to tide her over until the fall.  She will call when she needs new supplies.    She is not having any undue problems with mask fit, leak, pressure tolerance, or excessive airway pressure.    Her 30 day compliance shows usage for 100% of the last 30 days for an average of 8 hours and 14 minutes on CPAP set at 14 cm H2O.  Her average apnea hypopnea index of 0.1.  Her 95th percentile leak is 34.2 L/min.    Very active, works out many times a week    Her note from 6/15/2017:  Her card from 5/16/2017 until 6/14/2017 was reviewed. Her usage was 100% of days for greater than 4 hours with an average usage of 8 hours and 7 minutes. She is currently on CPAP at 14 cm. Her residual average AHI 0.1. Her 95th percentile leak was 28 L/m. Having no issues with the mask or pressure.     Since retiring, has become a master . \"Lives\" at the gym but hasn't been able to lose weight. Dr. 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     Her prior sleep study performed October 17, 2011 showed an AHI of 36.9, REM index of 70, and a low saturation of 62%. On CPAP at 14 cm patient's apnea hypopnea index was zero, the minimum saturation 91%, and the mean saturation 92.6%.     She was last seen in June 2016 and at that time her usage was quite similar. She was on 14 centimeters water pressure at that time but her old machine did not provide a residual apnea hypopnea index. 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 Otherwise she's been doing quite well healthwise.      Her significant comorbidities and  Modifying factors include hypertension, hypothyroidism, obesity, vitamin D deficiency, and chronic thyroiditis      Patient Active Problem List    Diagnosis Date Noted   • " "Morbid obesity with BMI of 40.0-44.9, adult ([facility]) 06/28/2018   • Chronic thyroiditis 02/12/2013   • thyroid nodules    • Vitamin d deficiency 02/16/2012   • Sleep apnea    • Hypertension 02/22/2010   • Hypothyroidism 02/22/2010   • Obesity    • Goiter        Past Medical History:   Diagnosis Date   • Chickenpox    • Frisian measles    • Hyperlipidemia    • Hypothyroidism    • Influenza    • Mumps    • Obesity    • Sleep apnea 2011    on CPAP   • thyroid nodules 2000    biopsy Hashimoto's thyroiditis / TPO ab neg        Past Surgical History:   Procedure Laterality Date   • PRIMARY C SECTION         Family History   Problem Relation Age of Onset   • Thyroid Mother    • Heart Attack Father    • Sleep Apnea Father    • Sleep Apnea Brother        Social History     Social History   • Marital status:      Spouse name: N/A   • Number of children: N/A   • Years of education: N/A     Occupational History   • Not on file.     Social History Main Topics   • Smoking status: Never Smoker   • Smokeless tobacco: Never Used   • Alcohol use Yes   • Drug use: No   • Sexual activity: Not on file     Other Topics Concern   • Not on file     Social History Narrative   • No narrative on file       Current Outpatient Prescriptions   Medication Sig Dispense Refill   • triamterene-hctz (MAXZIDE-25/DYAZIDE) 37.5-25 MG Tab   0   • SYNTHROID 125 MCG TABS TAKE 1 TABLET BY MOUTH EVERY DAY 30 Tab 6   • progesterone (PROMETRIUM) 100 MG CAPS Take 100 mg by mouth every day.       • hydrOXYzine (VISTARIL) 25 MG Cap   11   • phenazopyridine (PYRIDIUM) 100 MG Tab Take 1 Tab by mouth 3 times a day as needed. 6 Tab 0   • liothyronine (CYTOMEL) 5 MCG TABS Take 1 Tab by mouth 2 Times a Day. 60 Tab 3   • triamterene-hydrochlorothiazide (MAXZIDE 75-50) 75-50 MG TABS TAKE 1/2 TABLET BY MOUTH EVERY DAY 45 Tab 6   • EPINEPHrine (EPIPEN INJ) 0.3 mg by Injection route.       No current facility-administered medications for this visit.     \"CURRENT " "RX\"    ALLERGIES: Aspirin; Codeine; Fish; Maxepa; and Peanut oil    ROS  Constitutional: denies fever chills sweats fatigue and weight loss  Eyes: denies vision loss, pain, drainage, double vision, glasses  Ears, eyes, nose, mouth, throat: denies earache, difficulty hearing, injury, recurrence or throat, persistent hoarseness, decay teeth, toothaches; she does have ringing and buzzing in her ears at times and rhinitis and nasal congestion; lots of allergies. Nodules are getting slightly bigger.  Cardiovascular: denies chest pain, tightness, palpitations, swelling of legs and feet, fainting, difficulty breathing  Respiratory: see HPI   Sleep:see HPI  GI: denies heartburn, difficulty swallowing, nausea, abdominal pain, diarrhea, constipation  : denies issues; her last period was in 2013.  Musculoskeletal: denies painful joints, sore muscles, back pain  Integumentary: denies rashes, lumps, color change  Neuro: denies frequent headaches, dizziness, weakness      PHYSICAL EXAM  MSEW    /88   Pulse 72   Resp 18   Ht 1.676 m (5' 6\")   Wt 122 kg (269 lb) Comment: pt reported  SpO2 94%   BMI 43.42 kg/m²   Appearance: Well-nourished, well-developed, no acute distress  Eyes:  PERRLA, EOMI  ENMT: without lesions, deformities;hearing grossly intact; tongue normal, posterior pharynx without erythema or exudate; Mallampati classification:   Neck: Supple, trachea midline, no masses  Respiratory effort:  No intercostal retractions or use of accessory muscles  Lung auscultation:  No wheezes rhonchi rubs or rales  Cardiac: No murmurs, rubs, or gallops; regular rhythm, normal rate; no edema  Abdomen:  No tenderness, no organomegaly  Musculoskeletal:  Grossly normal; gait and station normal; digits and nails normal  Skin:  No rashes, petechiae, cyanosis  Neurologic: without focal signs; oriented to person, time, place, and purpose; judgement intact  Psychiatric:  No depression, anxiety, agitation        PROBLEMS:    1. " Sleep apnea, unspecified type      2. Morbid obesity with BMI of 40.0-44.9, adult (HCC)      3. Non-smoker      4. Essential hypertension      5.Thyroid nodules      6. Hypothyroidism due to Hashimoto's thyroiditis            PLAN:     Return in about 1 year (around 6/28/2019).    Has been advised to continue the current CPAP 14 cm H2O, clean equipment frequently, and get new mask and supplies as allowed by insurance and DME. Advised about potential problems including nasal obstruction/stuffiness, mask leak or discomfort , frequent awakenings, chill or dryness of the upper airway, noise, inconvenience, and lack of benefit. Recommend an earlier appointment, if significant treatment barriers develop.    Will speak with PCP about enlarging thyroid nodules. Continue gym work outs and weight watchers.

## 2018-08-09 ENCOUNTER — HOSPITAL ENCOUNTER (OUTPATIENT)
Dept: LAB | Facility: MEDICAL CENTER | Age: 63
End: 2018-08-09
Attending: FAMILY MEDICINE
Payer: COMMERCIAL

## 2018-08-09 LAB — TSH SERPL DL<=0.005 MIU/L-ACNC: 0.6 UIU/ML (ref 0.38–5.33)

## 2018-08-09 PROCEDURE — 84443 ASSAY THYROID STIM HORMONE: CPT

## 2018-08-09 PROCEDURE — 36415 COLL VENOUS BLD VENIPUNCTURE: CPT

## 2018-09-18 ENCOUNTER — HOSPITAL ENCOUNTER (OUTPATIENT)
Dept: RADIOLOGY | Facility: MEDICAL CENTER | Age: 63
End: 2018-09-18
Attending: NURSE PRACTITIONER
Payer: MEDICARE

## 2018-09-18 DIAGNOSIS — Z12.31 BREAST CANCER SCREENING BY MAMMOGRAM: ICD-10-CM

## 2018-09-18 PROCEDURE — 77067 SCR MAMMO BI INCL CAD: CPT

## 2018-09-27 ENCOUNTER — HOSPITAL ENCOUNTER (OUTPATIENT)
Dept: LAB | Facility: MEDICAL CENTER | Age: 63
End: 2018-09-27
Attending: FAMILY MEDICINE
Payer: MEDICARE

## 2018-09-27 LAB — TSH SERPL DL<=0.005 MIU/L-ACNC: 1.17 UIU/ML (ref 0.38–5.33)

## 2018-09-27 PROCEDURE — 84443 ASSAY THYROID STIM HORMONE: CPT

## 2018-09-27 PROCEDURE — 36415 COLL VENOUS BLD VENIPUNCTURE: CPT

## 2018-10-25 ENCOUNTER — APPOINTMENT (RX ONLY)
Dept: URBAN - METROPOLITAN AREA CLINIC 4 | Facility: CLINIC | Age: 63
Setting detail: DERMATOLOGY
End: 2018-10-25

## 2018-10-25 DIAGNOSIS — L73.8 OTHER SPECIFIED FOLLICULAR DISORDERS: ICD-10-CM

## 2018-10-25 DIAGNOSIS — L91.8 OTHER HYPERTROPHIC DISORDERS OF THE SKIN: ICD-10-CM

## 2018-10-25 DIAGNOSIS — D18.0 HEMANGIOMA: ICD-10-CM

## 2018-10-25 DIAGNOSIS — D22 MELANOCYTIC NEVI: ICD-10-CM

## 2018-10-25 DIAGNOSIS — L82.1 OTHER SEBORRHEIC KERATOSIS: ICD-10-CM

## 2018-10-25 DIAGNOSIS — L81.4 OTHER MELANIN HYPERPIGMENTATION: ICD-10-CM

## 2018-10-25 DIAGNOSIS — Z71.89 OTHER SPECIFIED COUNSELING: ICD-10-CM

## 2018-10-25 PROBLEM — D18.01 HEMANGIOMA OF SKIN AND SUBCUTANEOUS TISSUE: Status: ACTIVE | Noted: 2018-10-25

## 2018-10-25 PROBLEM — D22.5 MELANOCYTIC NEVI OF TRUNK: Status: ACTIVE | Noted: 2018-10-25

## 2018-10-25 PROCEDURE — ? COUNSELING

## 2018-10-25 PROCEDURE — ? BENIGN DESTRUCTION COSMETIC

## 2018-10-25 PROCEDURE — 99203 OFFICE O/P NEW LOW 30 MIN: CPT

## 2018-10-25 ASSESSMENT — LOCATION SIMPLE DESCRIPTION DERM
LOCATION SIMPLE: CHEST
LOCATION SIMPLE: ABDOMEN
LOCATION SIMPLE: LEFT UPPER ARM
LOCATION SIMPLE: UPPER BACK
LOCATION SIMPLE: LEFT FOREHEAD
LOCATION SIMPLE: LEFT CHEEK
LOCATION SIMPLE: RIGHT UPPER ARM
LOCATION SIMPLE: RIGHT POSTERIOR UPPER ARM
LOCATION SIMPLE: LEFT UPPER BACK
LOCATION SIMPLE: LEFT POSTERIOR THIGH
LOCATION SIMPLE: RIGHT POSTERIOR THIGH
LOCATION SIMPLE: LEFT POSTERIOR UPPER ARM

## 2018-10-25 ASSESSMENT — LOCATION ZONE DERM
LOCATION ZONE: ARM
LOCATION ZONE: FACE
LOCATION ZONE: LEG
LOCATION ZONE: TRUNK

## 2018-10-25 ASSESSMENT — LOCATION DETAILED DESCRIPTION DERM
LOCATION DETAILED: RIGHT ANTERIOR DISTAL UPPER ARM
LOCATION DETAILED: LEFT CENTRAL MALAR CHEEK
LOCATION DETAILED: EPIGASTRIC SKIN
LOCATION DETAILED: SUPERIOR THORACIC SPINE
LOCATION DETAILED: LEFT DISTAL POSTERIOR UPPER ARM
LOCATION DETAILED: LEFT INFERIOR MEDIAL FOREHEAD
LOCATION DETAILED: RIGHT PROXIMAL POSTERIOR UPPER ARM
LOCATION DETAILED: LEFT MEDIAL UPPER BACK
LOCATION DETAILED: LEFT SUPERIOR MEDIAL UPPER BACK
LOCATION DETAILED: RIGHT DISTAL POSTERIOR THIGH
LOCATION DETAILED: LEFT MEDIAL MALAR CHEEK
LOCATION DETAILED: LOWER STERNUM
LOCATION DETAILED: LEFT ANTERIOR DISTAL UPPER ARM
LOCATION DETAILED: LEFT PROXIMAL POSTERIOR UPPER ARM
LOCATION DETAILED: LEFT LATERAL SUPERIOR CHEST
LOCATION DETAILED: INFERIOR THORACIC SPINE
LOCATION DETAILED: LEFT DISTAL POSTERIOR THIGH
LOCATION DETAILED: LEFT INFERIOR CENTRAL MALAR CHEEK
LOCATION DETAILED: MIDDLE STERNUM
LOCATION DETAILED: RIGHT PROXIMAL LATERAL POSTERIOR UPPER ARM
LOCATION DETAILED: RIGHT RIB CAGE

## 2018-12-05 ENCOUNTER — TELEPHONE (OUTPATIENT)
Dept: PULMONOLOGY | Facility: HOSPICE | Age: 63
End: 2018-12-05

## 2018-12-05 DIAGNOSIS — G47.33 OSA (OBSTRUCTIVE SLEEP APNEA): ICD-10-CM

## 2018-12-05 NOTE — TELEPHONE ENCOUNTER
Adelaide called to get new DME Supplies sent to DME:  [facility] /  [phone] / fax [phone]  Last order is from 12/06/2017 Last OV: 06/28/2018   Next OV: no appointment on file  PLAN:      Return in about 1 year (around 6/28/2019).     Has been advised to continue the current CPAP 14 cm H2O, clean equipment frequently, and get new mask and supplies as allowed by insurance and DME. Advised about potential problems including nasal obstruction/stuffiness, mask leak or discomfort , frequent awakenings, chill or dryness of the upper airway, noise, inconvenience, and lack of benefit. Recommend an earlier appointment, if significant treatment barriers develop.     Will speak with PCP about enlarging thyroid nodules. Continue gym work outs and weight watchers.

## 2018-12-17 ENCOUNTER — HOSPITAL ENCOUNTER (OUTPATIENT)
Dept: LAB | Facility: MEDICAL CENTER | Age: 63
End: 2018-12-17
Attending: FAMILY MEDICINE
Payer: MEDICARE

## 2018-12-17 LAB
ANION GAP SERPL CALC-SCNC: 7 MMOL/L (ref 0–11.9)
BUN SERPL-MCNC: 13 MG/DL (ref 8–22)
CALCIUM SERPL-MCNC: 9.1 MG/DL (ref 8.5–10.5)
CHLORIDE SERPL-SCNC: 100 MMOL/L (ref 96–112)
CO2 SERPL-SCNC: 29 MMOL/L (ref 20–33)
CREAT SERPL-MCNC: 0.61 MG/DL (ref 0.5–1.4)
GLUCOSE SERPL-MCNC: 88 MG/DL (ref 65–99)
POTASSIUM SERPL-SCNC: 4.2 MMOL/L (ref 3.6–5.5)
SODIUM SERPL-SCNC: 136 MMOL/L (ref 135–145)
TSH SERPL DL<=0.005 MIU/L-ACNC: 1.09 UIU/ML (ref 0.38–5.33)

## 2018-12-17 PROCEDURE — 80048 BASIC METABOLIC PNL TOTAL CA: CPT

## 2018-12-17 PROCEDURE — 36415 COLL VENOUS BLD VENIPUNCTURE: CPT

## 2018-12-17 PROCEDURE — 84443 ASSAY THYROID STIM HORMONE: CPT

## 2019-06-10 PROBLEM — Z78.9 NON-SMOKER: Status: ACTIVE | Noted: 2019-06-10

## 2019-06-10 NOTE — PROGRESS NOTES
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  When last seen her compliance was excellent and her AHI was 0.1. Is a master , works out daily.    Her prior sleep study performed October 17, 2011 showed an AHI of 36.9, REM index of 70, and a low saturation of 62%. On CPAP at 14 cm patient's apnea hypopnea index was zero, the minimum saturation 91%, and the mean saturation 92.6%.    Today her 30-day compliance is 100% for 8 hours and 11 minutes.  She has an average residual apnea hypopnea index of 0.2 on CPAP at 14 cm H2O.    The patient reports improved symptoms using positive airway pressure.  Has experienced no significant problems with mask fit, mask leak, pressure tolerance, excessive airway dryness, nasal congestion, aerophagia, or chest pain.    Significant comorbidities and modifying factors include hypertension, hypothyroidism, obesity, vitamin D deficiency, chronic thyroiditis, and non-smoker.    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 Now taking digestive enzymes, hoping to lose weight. Daughter is a nurse.    Patient Active Problem List    Diagnosis Date Noted   • Non-smoker 06/10/2019   • Morbid obesity with BMI of 40.0-44.9, adult ([facility]) 06/28/2018   • Chronic thyroiditis 02/12/2013   • thyroid nodules    • Vitamin d deficiency 02/16/2012   • Sleep apnea    • Hypertension 02/22/2010   • Hypothyroidism 02/22/2010   • Obesity    • Goiter        Past Medical History:   Diagnosis Date   • Chickenpox    • Cymro measles    • Hyperlipidemia    • Hypothyroidism    • Influenza    • Mumps    • Obesity    • Sleep apnea 2011    on CPAP   • thyroid nodules 2000    biopsy Hashimoto's thyroiditis / TPO ab neg        Past Surgical History:   Procedure Laterality Date   • PRIMARY C SECTION         Family History   Problem Relation Age of Onset   • Thyroid Mother    • Cancer Mother         breast   • Heart  "Attack Father    • Sleep Apnea Father    • Sleep Apnea Brother    • Cancer Maternal Aunt         breast       Social History     Social History   • Marital status:      Spouse name: N/A   • Number of children: N/A   • Years of education: N/A     Occupational History   • Not on file.     Social History Main Topics   • Smoking status: Never Smoker   • Smokeless tobacco: Never Used   • Alcohol use Yes   • Drug use: No   • Sexual activity: Not on file     Other Topics Concern   • Not on file     Social History Narrative   • No narrative on file       Current Outpatient Prescriptions   Medication Sig Dispense Refill   • triamterene-hctz (MAXZIDE-25/DYAZIDE) 37.5-25 MG Tab   0   • SYNTHROID 125 MCG TABS TAKE 1 TABLET BY MOUTH EVERY DAY 30 Tab 6   • hydrOXYzine (VISTARIL) 25 MG Cap   11   • phenazopyridine (PYRIDIUM) 100 MG Tab Take 1 Tab by mouth 3 times a day as needed. (Patient not taking: Reported on 6/11/2019) 6 Tab 0   • liothyronine (CYTOMEL) 5 MCG TABS Take 1 Tab by mouth 2 Times a Day. (Patient not taking: Reported on 6/11/2019) 60 Tab 3   • triamterene-hydrochlorothiazide (MAXZIDE 75-50) 75-50 MG TABS TAKE 1/2 TABLET BY MOUTH EVERY DAY (Patient not taking: Reported on 6/11/2019) 45 Tab 6   • progesterone (PROMETRIUM) 100 MG CAPS Take 100 mg by mouth every day.       • EPINEPHrine (EPIPEN INJ) 0.3 mg by Injection route.       No current facility-administered medications for this visit.     \"CURRENT RX\"    ALLERGIES: Aspirin; Codeine; Fish; Maxepa; and Peanut oil    ROS    Constitutional: Denies fever, chills, sweats,  weight loss, fatigue  Cardiovascular: Denies chest pain, tightness, palpitations, swelling in legs/feet, fainting, difficulty breathing when lying down but gets better when sitting up.   Respiratory: Denies shortness of breath, cough, sputum, wheezing, painful breathing, coughing up blood.   Sleep: per HPI  Gastrointestinal: Denies  difficulty swallowing, nausea, abdominal pain, diarrhea, " "constipation, heartburn.  Musculoskeletal: Denies painful joints, sore muscles, back pain.        PHYSICAL EXAM  Obese  /74 (BP Location: Right arm, Patient Position: Sitting, BP Cuff Size: Large adult)   Pulse 61   Resp 16   Ht 1.676 m (5' 6\")   Wt (!) 128 kg (282 lb 3.2 oz)   SpO2 93%   BMI 45.55 kg/m²   Appearance: Well-nourished, well-developed, no acute distress  Eyes:  PERRLA, EOMI  ENMT: without lesions, deformities;hearing grossly intact; tongue normal, posterior pharynx without erythema or exudate; Mallampati classification:   Neck: Supple, trachea midline, no masses  Respiratory effort:  No intercostal retractions or use of accessory muscles  Lung auscultation:  No wheezes rhonchi rubs or rales  Cardiac: No murmurs, rubs, or gallops; regular rhythm, normal rate; no edema  Abdomen:  No tenderness, no organomegaly.  Obese  Musculoskeletal:  Grossly normal; gait and station normal; digits and nails normal  Skin:  No rashes, petechiae, cyanosis  Neurologic: without focal signs; oriented to person, time, place, and purpose; judgement intact  Psychiatric:  No depression, anxiety, agitation        PROBLEMS:  1. Sleep apnea, unspecified type    - DME Mask and Supplies    2. Morbid obesity with BMI of 40.0-44.9, adult ([facility])    - OBESITY COUNSELING (No Charge): Patient identified as having weight management issue.  Appropriate orders and counseling given.    3. Non-smoker      4. Hypothyroidism due to Hashimoto's thyroiditis      5. Essential hypertension      PLAN:   Has been advised to continue the current cpap 14 cm , clean equipment frequently, and get new mask and supplies as allowed by insurance and DME. Advised about potential problems including nasal obstruction/stuffiness, mask leak or discomfort , frequent awakenings, chill or dryness of the upper airway, noise, inconvenience, and lack of benefit. Recommend an earlier appointment, if significant treatment barriers develop.    Have advised the " patient to follow up with the appropriate healthcare practitioners for all other medical problems and issues.      Return in about 1 year (around 6/11/2020).

## 2019-06-11 ENCOUNTER — SLEEP CENTER VISIT (OUTPATIENT)
Dept: SLEEP MEDICINE | Facility: MEDICAL CENTER | Age: 64
End: 2019-06-11
Payer: MEDICARE

## 2019-06-11 VITALS
HEART RATE: 61 BPM | OXYGEN SATURATION: 93 % | SYSTOLIC BLOOD PRESSURE: 122 MMHG | HEIGHT: 66 IN | BODY MASS INDEX: 45.35 KG/M2 | RESPIRATION RATE: 16 BRPM | WEIGHT: 282.2 LBS | DIASTOLIC BLOOD PRESSURE: 74 MMHG

## 2019-06-11 DIAGNOSIS — E03.8 HYPOTHYROIDISM DUE TO HASHIMOTO'S THYROIDITIS: ICD-10-CM

## 2019-06-11 DIAGNOSIS — E06.3 HYPOTHYROIDISM DUE TO HASHIMOTO'S THYROIDITIS: ICD-10-CM

## 2019-06-11 DIAGNOSIS — G47.30 SLEEP APNEA, UNSPECIFIED TYPE: ICD-10-CM

## 2019-06-11 DIAGNOSIS — I10 ESSENTIAL HYPERTENSION: ICD-10-CM

## 2019-06-11 DIAGNOSIS — E66.01 MORBID OBESITY WITH BMI OF 40.0-44.9, ADULT (HCC): ICD-10-CM

## 2019-06-11 DIAGNOSIS — Z78.9 NON-SMOKER: ICD-10-CM

## 2019-06-11 PROCEDURE — 99214 OFFICE O/P EST MOD 30 MIN: CPT | Performed by: INTERNAL MEDICINE

## 2019-06-22 ENCOUNTER — HOSPITAL ENCOUNTER (OUTPATIENT)
Dept: LAB | Facility: MEDICAL CENTER | Age: 64
End: 2019-06-22
Attending: FAMILY MEDICINE
Payer: MEDICARE

## 2019-06-22 LAB
ALBUMIN SERPL BCP-MCNC: 4.3 G/DL (ref 3.2–4.9)
ALBUMIN/GLOB SERPL: 1.4 G/DL
ALP SERPL-CCNC: 76 U/L (ref 30–99)
ALT SERPL-CCNC: 30 U/L (ref 2–50)
ANION GAP SERPL CALC-SCNC: 9 MMOL/L (ref 0–11.9)
AST SERPL-CCNC: 23 U/L (ref 12–45)
BILIRUB SERPL-MCNC: 0.7 MG/DL (ref 0.1–1.5)
BUN SERPL-MCNC: 12 MG/DL (ref 8–22)
CALCIUM SERPL-MCNC: 9.5 MG/DL (ref 8.5–10.5)
CHLORIDE SERPL-SCNC: 99 MMOL/L (ref 96–112)
CHOLEST SERPL-MCNC: 176 MG/DL (ref 100–199)
CO2 SERPL-SCNC: 28 MMOL/L (ref 20–33)
CREAT SERPL-MCNC: 0.54 MG/DL (ref 0.5–1.4)
FASTING STATUS PATIENT QL REPORTED: NORMAL
GLOBULIN SER CALC-MCNC: 3 G/DL (ref 1.9–3.5)
GLUCOSE SERPL-MCNC: 97 MG/DL (ref 65–99)
HDLC SERPL-MCNC: 63 MG/DL
LDLC SERPL CALC-MCNC: 98 MG/DL
POTASSIUM SERPL-SCNC: 4 MMOL/L (ref 3.6–5.5)
PROT SERPL-MCNC: 7.3 G/DL (ref 6–8.2)
SODIUM SERPL-SCNC: 136 MMOL/L (ref 135–145)
TRIGL SERPL-MCNC: 76 MG/DL (ref 0–149)
TSH SERPL DL<=0.005 MIU/L-ACNC: 0.95 UIU/ML (ref 0.38–5.33)

## 2019-06-22 PROCEDURE — 36415 COLL VENOUS BLD VENIPUNCTURE: CPT

## 2019-06-22 PROCEDURE — 80053 COMPREHEN METABOLIC PANEL: CPT

## 2019-06-22 PROCEDURE — 84443 ASSAY THYROID STIM HORMONE: CPT

## 2019-06-22 PROCEDURE — 80061 LIPID PANEL: CPT

## 2019-09-19 ENCOUNTER — HOSPITAL ENCOUNTER (OUTPATIENT)
Dept: RADIOLOGY | Facility: MEDICAL CENTER | Age: 64
End: 2019-09-19
Attending: FAMILY MEDICINE
Payer: MEDICARE

## 2019-09-19 DIAGNOSIS — Z12.31 SCREENING MAMMOGRAM, ENCOUNTER FOR: ICD-10-CM

## 2019-09-19 PROCEDURE — 77063 BREAST TOMOSYNTHESIS BI: CPT

## 2019-10-29 ENCOUNTER — APPOINTMENT (RX ONLY)
Dept: URBAN - METROPOLITAN AREA CLINIC 22 | Facility: CLINIC | Age: 64
Setting detail: DERMATOLOGY
End: 2019-10-29

## 2019-10-29 DIAGNOSIS — L81.4 OTHER MELANIN HYPERPIGMENTATION: ICD-10-CM

## 2019-10-29 DIAGNOSIS — L91.8 OTHER HYPERTROPHIC DISORDERS OF THE SKIN: ICD-10-CM

## 2019-10-29 DIAGNOSIS — L82.1 OTHER SEBORRHEIC KERATOSIS: ICD-10-CM

## 2019-10-29 DIAGNOSIS — L30.0 NUMMULAR DERMATITIS: ICD-10-CM

## 2019-10-29 DIAGNOSIS — I78.8 OTHER DISEASES OF CAPILLARIES: ICD-10-CM

## 2019-10-29 DIAGNOSIS — Z71.89 OTHER SPECIFIED COUNSELING: ICD-10-CM

## 2019-10-29 DIAGNOSIS — D22 MELANOCYTIC NEVI: ICD-10-CM

## 2019-10-29 PROBLEM — D22.5 MELANOCYTIC NEVI OF TRUNK: Status: ACTIVE | Noted: 2019-10-29

## 2019-10-29 PROBLEM — L30.9 DERMATITIS, UNSPECIFIED: Status: ACTIVE | Noted: 2019-10-29

## 2019-10-29 PROBLEM — D23.39 OTHER BENIGN NEOPLASM OF SKIN OF OTHER PARTS OF FACE: Status: ACTIVE | Noted: 2019-10-29

## 2019-10-29 PROCEDURE — ? BENIGN DESTRUCTION

## 2019-10-29 PROCEDURE — ? COUNSELING

## 2019-10-29 PROCEDURE — ? ADDITIONAL NOTES

## 2019-10-29 PROCEDURE — ? PRESCRIPTION

## 2019-10-29 PROCEDURE — 99214 OFFICE O/P EST MOD 30 MIN: CPT | Mod: 25

## 2019-10-29 PROCEDURE — 17110 DESTRUCTION B9 LES UP TO 14: CPT

## 2019-10-29 RX ORDER — TRIAMCINOLONE ACETONIDE 1 MG/G
CREAM TOPICAL BID
Qty: 1 | Refills: 0 | Status: ERX

## 2019-10-29 ASSESSMENT — LOCATION DETAILED DESCRIPTION DERM
LOCATION DETAILED: LEFT VENTRAL PROXIMAL FOREARM
LOCATION DETAILED: RIGHT AXILLARY TAIL OF BREAST
LOCATION DETAILED: RIGHT PROXIMAL PRETIBIAL REGION
LOCATION DETAILED: RIGHT INFERIOR CENTRAL MALAR CHEEK
LOCATION DETAILED: INFERIOR THORACIC SPINE
LOCATION DETAILED: RIGHT POSTERIOR AXILLA
LOCATION DETAILED: RIGHT VENTRAL PROXIMAL FOREARM
LOCATION DETAILED: SUPERIOR THORACIC SPINE
LOCATION DETAILED: RIGHT CENTRAL PARIETAL SCALP
LOCATION DETAILED: RIGHT NASAL SIDEWALL
LOCATION DETAILED: LOWER STERNUM
LOCATION DETAILED: LEFT DISTAL PRETIBIAL REGION
LOCATION DETAILED: INFERIOR MID FOREHEAD

## 2019-10-29 ASSESSMENT — LOCATION SIMPLE DESCRIPTION DERM
LOCATION SIMPLE: INFERIOR FOREHEAD
LOCATION SIMPLE: RIGHT POSTERIOR AXILLA
LOCATION SIMPLE: RIGHT CHEEK
LOCATION SIMPLE: LEFT FOREARM
LOCATION SIMPLE: RIGHT PRETIBIAL REGION
LOCATION SIMPLE: CHEST
LOCATION SIMPLE: RIGHT FOREARM
LOCATION SIMPLE: LEFT PRETIBIAL REGION
LOCATION SIMPLE: UPPER BACK
LOCATION SIMPLE: SCALP
LOCATION SIMPLE: RIGHT BREAST
LOCATION SIMPLE: RIGHT NOSE

## 2019-10-29 ASSESSMENT — LOCATION ZONE DERM
LOCATION ZONE: NOSE
LOCATION ZONE: FACE
LOCATION ZONE: LEG
LOCATION ZONE: ARM
LOCATION ZONE: AXILLAE
LOCATION ZONE: TRUNK
LOCATION ZONE: SCALP

## 2019-10-29 NOTE — HPI: RASH
What Type Of Note Output Would You Prefer (Optional)?: Standard Output
How Severe Is Your Rash?: moderate
Is This A New Presentation, Or A Follow-Up?: Rash
Additional History: Has tried and failed hydrocortisone otc, Calamine Lotion, and Neosporin

## 2019-10-29 NOTE — PROCEDURE: BENIGN DESTRUCTION
Post-Care Instructions: I reviewed with the patient in detail post-care instructions. Patient is to wear sunprotection, and avoid picking at any of the treated lesions. Pt may apply Vaseline to crusted or scabbing areas.
Include Z78.9 (Other Specified Conditions Influencing Health Status) As An Associated Diagnosis?: No
Bill Insurance (You Assume Risk Of Denial Or Audit By Selecting Yes): Yes
Detail Level: Detailed
Anesthesia Volume In Cc: 0
Medical Necessity Information: It is in your best interest to select a reason for this procedure from the list below. All of these items fulfill various CMS LCD requirements except the new and changing color options.
Consent: The patient's consent was obtained including but not limited to risks of crusting, scabbing, blistering, scarring, darker or lighter pigmentary change, recurrence, incomplete removal and infection.
Medical Necessity Clause: This procedure was medically necessary because the lesions that were treated were:

## 2020-02-20 ENCOUNTER — HOSPITAL ENCOUNTER (OUTPATIENT)
Dept: LAB | Facility: MEDICAL CENTER | Age: 65
End: 2020-02-20
Attending: FAMILY MEDICINE
Payer: MEDICARE

## 2020-02-20 LAB — TSH SERPL DL<=0.005 MIU/L-ACNC: 0.99 UIU/ML (ref 0.38–5.33)

## 2020-02-20 PROCEDURE — 84443 ASSAY THYROID STIM HORMONE: CPT

## 2020-02-20 PROCEDURE — 36415 COLL VENOUS BLD VENIPUNCTURE: CPT

## 2020-05-08 ENCOUNTER — TELEPHONE (OUTPATIENT)
Dept: SCHEDULING | Facility: IMAGING CENTER | Age: 65
End: 2020-05-08

## 2020-05-08 NOTE — LETTER
[facility]  Shey Pastrana P.A.-C  Fax: [phone]   Authorization for Release/Disclosure of   Protected Health Information   Name: ADELAIDE BARBA : 1955 SSN: [id number]   Address: [address] [place] [address] [place] [address] Phone:    [phone] (home)    I authorize the entity listed below to release/disclose the PHI below to:   Formerly [facility]/Krysten Peña M.D. and Shey Pastrana P.A.-C.   Provider or Entity Name: Krysten Peña M.D.     Address   [facility], [place]  [address], [address]  [place] [address] Phone:  [phone]    Fax:  [phone]   Reason for request: continuity of care   Information to be released:    [  ] LAST COLONOSCOPY,  including any PATH REPORT and follow-up  [  ] LAST FIT/COLOGUARD RESULT [  ] LAST DEXA  [  ] LAST MAMMOGRAM  [  ] LAST PAP  [  ] LAST LABS [  ] RETINA EXAM REPORT  [  ] IMMUNIZATION RECORDS  [X] Release all info        DATES OF SERVICE OR TIME PERIOD TO BE DISCLOSED: _____________  I understand and acknowledge that:  * This Authorization may be revoked at any time by you in writing, except if your health information has already been used or disclosed.  * Your health information that will be used or disclosed as a result of you signing this authorization could be re-disclosed by the recipient. If this occurs, your re-disclosed health information may no longer be protected by State or Federal laws.  * You may refuse to sign this Authorization. Your refusal will not affect your ability to obtain treatment.  * This Authorization becomes effective upon signing and will  on (date) __________.      If no date is indicated, this Authorization will  one (1) year from the signature date.    Name: Adelaide Barba    Signature: Continuity Of Care   Date:     2020       PLEASE FAX REQUESTED RECORDS BACK TO: [phone]

## 2020-06-02 ENCOUNTER — OFFICE VISIT (OUTPATIENT)
Dept: ENDOCRINOLOGY | Facility: MEDICAL CENTER | Age: 65
End: 2020-06-02
Payer: MEDICARE

## 2020-06-02 VITALS
HEART RATE: 68 BPM | BODY MASS INDEX: 46.82 KG/M2 | HEIGHT: 65 IN | DIASTOLIC BLOOD PRESSURE: 88 MMHG | SYSTOLIC BLOOD PRESSURE: 140 MMHG | WEIGHT: 281 LBS

## 2020-06-02 DIAGNOSIS — Z78.0 MENOPAUSE: ICD-10-CM

## 2020-06-02 DIAGNOSIS — E04.9 GOITER: ICD-10-CM

## 2020-06-02 DIAGNOSIS — E06.3 HYPOTHYROIDISM DUE TO HASHIMOTO'S THYROIDITIS: ICD-10-CM

## 2020-06-02 DIAGNOSIS — E06.5 CHRONIC THYROIDITIS: ICD-10-CM

## 2020-06-02 DIAGNOSIS — E03.8 HYPOTHYROIDISM DUE TO HASHIMOTO'S THYROIDITIS: ICD-10-CM

## 2020-06-02 DIAGNOSIS — G47.30 SLEEP APNEA, UNSPECIFIED TYPE: ICD-10-CM

## 2020-06-02 DIAGNOSIS — E66.01 MORBID OBESITY WITH BMI OF 40.0-44.9, ADULT (HCC): ICD-10-CM

## 2020-06-02 DIAGNOSIS — E55.9 VITAMIN D DEFICIENCY: ICD-10-CM

## 2020-06-02 PROCEDURE — 99214 OFFICE O/P EST MOD 30 MIN: CPT | Performed by: INTERNAL MEDICINE

## 2020-06-02 NOTE — PROGRESS NOTES
Chief Complaint   Patient presents with   • Hypothyroidism     Chronic autoimmune thyroiditis/thyroid nodules   • Obesity        HPI:    Thyroid status           I have seen the patient in the past for hypothyroidism secondary to Hashimoto's thyroiditis.  She currently is managed with Synthroid 125 mcg/day.  Formally she did take Cytomel but has not taken that for a few years.  She wants to make sure her thyroid replacement is optimal.  She notices that her fingernails are not growing as before.  Skin is dry and she has cold intolerance.  She does recognize these as symptoms of hypothyroidism.             Her TSH in February was 0.9.  She wants to be assured that her replacement therapy is optimal.            She also has multiple small thyroid nodules that are subcentimeter and have not been biopsied.  Thyroid gland is asymptomatic. Patient is not aware of any change in her gland. No discomfort. No difficulty swallowing, breathing, or voice change.             We will update thyroid ultrasound for follow-up.  Her last was 2 years ago.  She had had a previous biopsy that was interpreted as thyroiditis.    Morbid obesity           This is really her main focus for today's appointment.  She is very frustrated by her inability to control her weight she feels she eats very sensibly and not excessively.  She is very active around her property doing yard work and helping others with theirs.  She walks frequently and does other exercise.           Her lipids are in excellent range and does not suggest metabolic syndrome or insulin resistance.  Fasting glucose and A1c in the past have been normal.           I think she is intelligent and sincere.  I believe she would benefit from Dr. Dominguez's clinic    ROS:  All other systems reported as negative       Allergies:   Allergies   Allergen Reactions   • Aspirin    • Codeine    • Fish    • Maxepa    • Peanut Oil        Current medicines including changes today:  Current  "Outpatient Medications   Medication Sig Dispense Refill   • triamterene-hctz (MAXZIDE-25/DYAZIDE) 37.5-25 MG Tab   0   • phenazopyridine (PYRIDIUM) 100 MG Tab Take 1 Tab by mouth 3 times a day as needed. 6 Tab 0   • SYNTHROID 125 MCG TABS TAKE 1 TABLET BY MOUTH EVERY DAY 30 Tab 6   • triamterene-hydrochlorothiazide (MAXZIDE 75-50) 75-50 MG TABS TAKE 1/2 TABLET BY MOUTH EVERY DAY 45 Tab 6   • EPINEPHrine (EPIPEN INJ) 0.3 mg by Injection route.     • hydrOXYzine (VISTARIL) 25 MG Cap   11   • liothyronine (CYTOMEL) 5 MCG TABS Take 1 Tab by mouth 2 Times a Day. (Patient not taking: Reported on 6/11/2019) 60 Tab 3   • progesterone (PROMETRIUM) 100 MG CAPS Take 100 mg by mouth every day.         No current facility-administered medications for this visit.         Past Medical History:   Diagnosis Date   • Chickenpox    • [place] measles    • Hyperlipidemia    • Hypothyroidism    • Influenza    • Mumps    • Obesity    • Sleep apnea 2011    on CPAP   • thyroid nodules 2000    biopsy Hashimoto's thyroiditis / TPO ab neg       PHYSICAL EXAM:    /88 (BP Location: Left arm, Patient Position: Sitting, BP Cuff Size: Adult)   Pulse 68   Ht 1.651 m (5' 5\")   Wt (!) 127.5 kg (281 lb)   BMI 46.76 kg/m²     Gen.   obese but otherwise appears healthy             Does not have cushingoid or acromegalic features    Skin   appropriate for sex and age    HEENT  unremarkable    Neck   thyroid gland is about normal size and very difficult to palpate.  No definite nodules are palpable in the gland or elsewhere in the neck or supraclavicular areas.    Heart  regular    Extremities  no edema    Neuro  gait and station normal             She feels her strength is excellent    Psych  appropriate, calm, pleasant      ASSESSMENT AND RECOMMENDATIONS    1. Hypothyroidism due to Hashimoto's thyroiditis    - FREE THYROXINE; Future  - TSH; Future  - TRIIDOTHYRONINE; Future  - REFERRAL TO OTHER    2. Chronic thyroiditis              Thyroid " biopsy in the past positive for Hashimoto's but antibody negative  - THYROID PEROXIDASE  (TPO) AB; Future    3. thyroid nodules              Small subcentimeter nodules.  Last ultrasound 2 years ago  - US-SOFT TISSUES OF HEAD - NECK; Future    4. Morbid obesity with BMI of 40.0-44.9, adult (HCC)    - ACTH; Future  - CORTISOL; Future  - Lipid Profile; Future  - Comp Metabolic Panel; Future  - REFERRAL TO OTHER    5. Vitamin D deficiency    - VITAMIN D,25 HYDROXY; Future    6. Sleep apnea, unspecified type    - CBC WITH DIFFERENTIAL; Future  - REFERRAL TO OTHER    7. Menopause  - DS-BONE DENSITY STUDY (DEXA); Future      DISPOSITION: Follow-up lab by my chart or telephone                             Referral to Dr. Bhaita's clinic                             Return to my clinic in 2 months      Ghassan Mooney M.D.    Copies to: Krysten Peña M.D. [phone]

## 2020-06-05 ENCOUNTER — HOSPITAL ENCOUNTER (OUTPATIENT)
Dept: LAB | Facility: MEDICAL CENTER | Age: 65
End: 2020-06-05
Attending: INTERNAL MEDICINE
Payer: MEDICARE

## 2020-06-05 DIAGNOSIS — E06.5 CHRONIC THYROIDITIS: ICD-10-CM

## 2020-06-05 DIAGNOSIS — E66.01 MORBID OBESITY WITH BMI OF 40.0-44.9, ADULT (HCC): ICD-10-CM

## 2020-06-05 DIAGNOSIS — E06.3 HYPOTHYROIDISM DUE TO HASHIMOTO'S THYROIDITIS: ICD-10-CM

## 2020-06-05 DIAGNOSIS — G47.30 SLEEP APNEA, UNSPECIFIED TYPE: ICD-10-CM

## 2020-06-05 DIAGNOSIS — E55.9 VITAMIN D DEFICIENCY: ICD-10-CM

## 2020-06-05 DIAGNOSIS — E03.8 HYPOTHYROIDISM DUE TO HASHIMOTO'S THYROIDITIS: ICD-10-CM

## 2020-06-05 LAB
25(OH)D3 SERPL-MCNC: 39 NG/ML (ref 30–100)
ALBUMIN SERPL BCP-MCNC: 4.3 G/DL (ref 3.2–4.9)
ALBUMIN/GLOB SERPL: 1.5 G/DL
ALP SERPL-CCNC: 96 U/L (ref 30–99)
ALT SERPL-CCNC: 28 U/L (ref 2–50)
ANION GAP SERPL CALC-SCNC: 11 MMOL/L (ref 7–16)
AST SERPL-CCNC: 19 U/L (ref 12–45)
BASOPHILS # BLD AUTO: 0.5 % (ref 0–1.8)
BASOPHILS # BLD: 0.03 K/UL (ref 0–0.12)
BILIRUB SERPL-MCNC: 0.6 MG/DL (ref 0.1–1.5)
BUN SERPL-MCNC: 9 MG/DL (ref 8–22)
CALCIUM SERPL-MCNC: 9.2 MG/DL (ref 8.5–10.5)
CHLORIDE SERPL-SCNC: 91 MMOL/L (ref 96–112)
CHOLEST SERPL-MCNC: 168 MG/DL (ref 100–199)
CO2 SERPL-SCNC: 27 MMOL/L (ref 20–33)
CORTIS SERPL-MCNC: 8.2 UG/DL (ref 0–23)
CREAT SERPL-MCNC: 0.41 MG/DL (ref 0.5–1.4)
EOSINOPHIL # BLD AUTO: 0.05 K/UL (ref 0–0.51)
EOSINOPHIL NFR BLD: 0.9 % (ref 0–6.9)
ERYTHROCYTE [DISTWIDTH] IN BLOOD BY AUTOMATED COUNT: 45.9 FL (ref 35.9–50)
FASTING STATUS PATIENT QL REPORTED: NORMAL
GLOBULIN SER CALC-MCNC: 2.8 G/DL (ref 1.9–3.5)
GLUCOSE SERPL-MCNC: 90 MG/DL (ref 65–99)
HCT VFR BLD AUTO: 45 % (ref 37–47)
HDLC SERPL-MCNC: 76 MG/DL
HGB BLD-MCNC: 15.1 G/DL (ref 12–16)
IMM GRANULOCYTES # BLD AUTO: 0.02 K/UL (ref 0–0.11)
IMM GRANULOCYTES NFR BLD AUTO: 0.3 % (ref 0–0.9)
LDLC SERPL CALC-MCNC: 82 MG/DL
LYMPHOCYTES # BLD AUTO: 1.63 K/UL (ref 1–4.8)
LYMPHOCYTES NFR BLD: 28.4 % (ref 22–41)
MCH RBC QN AUTO: 30.8 PG (ref 27–33)
MCHC RBC AUTO-ENTMCNC: 33.6 G/DL (ref 33.6–35)
MCV RBC AUTO: 91.6 FL (ref 81.4–97.8)
MONOCYTES # BLD AUTO: 0.5 K/UL (ref 0–0.85)
MONOCYTES NFR BLD AUTO: 8.7 % (ref 0–13.4)
NEUTROPHILS # BLD AUTO: 3.5 K/UL (ref 2–7.15)
NEUTROPHILS NFR BLD: 61.2 % (ref 44–72)
NRBC # BLD AUTO: 0 K/UL
NRBC BLD-RTO: 0 /100 WBC
PLATELET # BLD AUTO: 246 K/UL (ref 164–446)
PMV BLD AUTO: 10.3 FL (ref 9–12.9)
POTASSIUM SERPL-SCNC: 4.1 MMOL/L (ref 3.6–5.5)
PROT SERPL-MCNC: 7.1 G/DL (ref 6–8.2)
RBC # BLD AUTO: 4.91 M/UL (ref 4.2–5.4)
SODIUM SERPL-SCNC: 129 MMOL/L (ref 135–145)
T3 SERPL-MCNC: 65.4 NG/DL (ref 60–181)
T4 FREE SERPL-MCNC: 1.73 NG/DL (ref 0.93–1.7)
THYROPEROXIDASE AB SERPL-ACNC: <9 IU/ML (ref 0–9)
TRIGL SERPL-MCNC: 48 MG/DL (ref 0–149)
TSH SERPL DL<=0.005 MIU/L-ACNC: 0.69 UIU/ML (ref 0.38–5.33)
WBC # BLD AUTO: 5.7 K/UL (ref 4.8–10.8)

## 2020-06-05 PROCEDURE — 86376 MICROSOMAL ANTIBODY EACH: CPT

## 2020-06-05 PROCEDURE — 80053 COMPREHEN METABOLIC PANEL: CPT

## 2020-06-05 PROCEDURE — 36415 COLL VENOUS BLD VENIPUNCTURE: CPT

## 2020-06-05 PROCEDURE — 82024 ASSAY OF ACTH: CPT

## 2020-06-05 PROCEDURE — 84480 ASSAY TRIIODOTHYRONINE (T3): CPT

## 2020-06-05 PROCEDURE — 82306 VITAMIN D 25 HYDROXY: CPT

## 2020-06-05 PROCEDURE — 80061 LIPID PANEL: CPT

## 2020-06-05 PROCEDURE — 82533 TOTAL CORTISOL: CPT

## 2020-06-05 PROCEDURE — 85025 COMPLETE CBC W/AUTO DIFF WBC: CPT

## 2020-06-05 PROCEDURE — 84439 ASSAY OF FREE THYROXINE: CPT

## 2020-06-05 PROCEDURE — 84443 ASSAY THYROID STIM HORMONE: CPT

## 2020-06-07 LAB — ACTH PLAS-MCNC: 13.8 PG/ML (ref 7.2–63.3)

## 2020-06-09 ENCOUNTER — HOSPITAL ENCOUNTER (OUTPATIENT)
Dept: RADIOLOGY | Facility: MEDICAL CENTER | Age: 65
End: 2020-06-09
Attending: INTERNAL MEDICINE
Payer: MEDICARE

## 2020-06-09 DIAGNOSIS — E04.9 GOITER: ICD-10-CM

## 2020-06-09 PROCEDURE — 76536 US EXAM OF HEAD AND NECK: CPT

## 2020-06-10 NOTE — PROGRESS NOTES
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  When last seen her compliance was excellent and her AHI was 0.1. Is a master , works out daily.     Her prior sleep study performed October 17, 2011 showed an AHI of 36.9, REM index of 70, and a low saturation of 62%. On CPAP at 14 cm patient's apnea hypopnea index was zero, the minimum saturation 91%, and the mean saturation 92.6%.     Today her 30-day, 6/12/2020,     The patient reports improved symptoms using positive airway pressure.  Has experienced no significant problems with mask fit, mask leak, pressure tolerance, excessive airway dryness, nasal congestion, aerophagia, or chest pain.     Significant comorbidities and modifying factors include hypertension, hypothyroidism, obesity, vitamin D deficiency, chronic thyroiditis, and non-smoker.     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 Now taking digestive enzymes, hoping to lose weight. Daughter is a nurse.

## 2020-06-12 ENCOUNTER — TELEMEDICINE (OUTPATIENT)
Dept: SLEEP MEDICINE | Facility: MEDICAL CENTER | Age: 65
End: 2020-06-12
Payer: MEDICARE

## 2020-06-12 VITALS — BODY MASS INDEX: 45.16 KG/M2 | WEIGHT: 281 LBS | HEIGHT: 66 IN

## 2020-06-12 DIAGNOSIS — G47.33 OBSTRUCTIVE SLEEP APNEA SYNDROME: ICD-10-CM

## 2020-06-12 DIAGNOSIS — E66.01 MORBID OBESITY WITH BMI OF 40.0-44.9, ADULT (HCC): ICD-10-CM

## 2020-06-12 DIAGNOSIS — Z78.9 NON-SMOKER: ICD-10-CM

## 2020-06-12 DIAGNOSIS — E06.3 HYPOTHYROIDISM DUE TO HASHIMOTO'S THYROIDITIS: ICD-10-CM

## 2020-06-12 DIAGNOSIS — I10 ESSENTIAL HYPERTENSION: ICD-10-CM

## 2020-06-12 DIAGNOSIS — E03.8 HYPOTHYROIDISM DUE TO HASHIMOTO'S THYROIDITIS: ICD-10-CM

## 2020-06-12 PROCEDURE — 99212 OFFICE O/P EST SF 10 MIN: CPT | Mod: 95,CR | Performed by: INTERNAL MEDICINE

## 2020-06-12 ASSESSMENT — FIBROSIS 4 INDEX: FIB4 SCORE: 0.96

## 2020-06-12 NOTE — PROGRESS NOTES
Telemedicine Visit: Established Patient     This encounter was conducted via Zoom .   Verbal consent was obtained. Patient's identity was verified.    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  When last seen her compliance was excellent and her AHI was 0.1. Is a master , works out daily.     Her prior sleep study performed October 17, 2011 showed an AHI of 36.9, REM index of 70, and a low saturation of 62%. On CPAP at 14 cm patient's apnea hypopnea index was zero, the minimum saturation 91%, and the mean saturation 92.6%.     Today her 30-day, 6/12/2020, her compliance is now unknown as she is not wireless and this is a virtual visit.     The patient reports improved symptoms using positive airway pressure.  Has experienced no significant problems with mask fit, mask leak, pressure tolerance, excessive airway dryness, nasal congestion, aerophagia, or chest pain.     Significant comorbidities and modifying factors include hypertension, hypothyroidism, obesity, vitamin D deficiency, chronic thyroiditis, and non-smoker.     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 Now taking digestive enzymes, hoping to lose weight. Daughter is a nurse. Dr. Mooney doesn't think she has insulin resistance.    Allergies   Allergen Reactions   • Aspirin    • Codeine    • Fish    • Maxepa    • Peanut Oil        Current medicines (including changes today)  Current Outpatient Medications   Medication Sig Dispense Refill   • triamterene-hctz (MAXZIDE-25/DYAZIDE) 37.5-25 MG Tab   0   • SYNTHROID 125 MCG TABS TAKE 1 TABLET BY MOUTH EVERY DAY 30 Tab 6   • EPINEPHrine (EPIPEN INJ) 0.3 mg by Injection route.     • hydrOXYzine (VISTARIL) 25 MG Cap   11   • phenazopyridine (PYRIDIUM) 100 MG Tab Take 1 Tab by mouth 3 times a day as needed. 6 Tab 0   • liothyronine (CYTOMEL) 5 MCG TABS Take 1 Tab by mouth  "2 Times a Day. (Patient not taking: Reported on 6/11/2019) 60 Tab 3   • triamterene-hydrochlorothiazide (MAXZIDE 75-50) 75-50 MG TABS TAKE 1/2 TABLET BY MOUTH EVERY DAY 45 Tab 6   • progesterone (PROMETRIUM) 100 MG CAPS Take 100 mg by mouth every day.         No current facility-administered medications for this visit.        Patient Active Problem List    Diagnosis Date Noted   • Non-smoker 06/10/2019   • Morbid obesity with BMI of 40.0-44.9, adult ([facility]) 06/28/2018   • Chronic thyroiditis 02/12/2013   • thyroid nodules    • Vitamin D deficiency 02/16/2012   • Sleep apnea    • Hypertension 02/22/2010   • Hypothyroidism 02/22/2010   • Obesity    • Goiter        Family History   Problem Relation Age of Onset   • Thyroid Mother    • Cancer Mother         breast   • Heart Attack Father    • Sleep Apnea Father    • Sleep Apnea Brother    • Cancer Maternal Aunt         breast       She  has a past medical history of Chickenpox, [place] measles, Hyperlipidemia, Hypothyroidism, Influenza, Mumps, Obesity, Sleep apnea (2011), and thyroid nodules (2000).  She  has a past surgical history that includes primary c section.       Objective:   Ht 1.676 m (5' 6\")   Wt (!) 127.5 kg (281 lb)   BMI 45.35 kg/m²   Vitals blood pressure 140/88  Physical Exam:  Constitutional: Alert, no distress, well-groomed.  Skin: No rashes in visible areas.  Eye: Round. Conjunctiva clear, lids normal. No icterus.   ENMT: Lips pink without lesions, good dentition, moist mucous membranes. Phonation normal.  Neck: No masses, no thyromegaly. Moves freely without pain.  CV: Pulse as reported by patient  Respiratory: Unlabored respiratory effort, no cough or audible wheeze  Psych: Alert and oriented x3, normal affect and mood.       Assessment and Plan:   The following treatment plan was discussed:     1. Obstructive sleep apnea syndrome    2. Morbid obesity with BMI of 40.0-44.9, adult ([facility])    3. Non-smoker    4. Essential hypertension    5. " Hypothyroidism due to Hashimoto's thyroiditis      Has been advised to continue the current CPAP 14 cm water, clean equipment frequently, and get new mask and supplies as allowed by insurance and DME. Advised about potential problems including nasal obstruction/stuffiness, mask leak or discomfort , frequent awakenings, chill or dryness of the upper airway, noise, inconvenience, and lack of benefit. Recommend an earlier appointment, if significant treatment barriers develop.    Have advised the patient to follow up with the appropriate healthcare practitioners for all other medical problems and issues.    Follow-up: RTC in approximately 1 year.

## 2020-06-16 ENCOUNTER — TELEPHONE (OUTPATIENT)
Dept: ENDOCRINOLOGY | Facility: MEDICAL CENTER | Age: 65
End: 2020-06-16

## 2020-06-17 NOTE — TELEPHONE ENCOUNTER
Telephone conversation with patient    Laboratory data reviewed.  Results overall are excellent.    Her lipids are exceptionally good with a TC/HDL ratio of 2.2.    Thyroid levels are upper normal but not excessive.    Sodium is low at 129 but she is purposely drinking excessive water.  Her weight watcher protocol indicated she should.  Her urine is very light color almost like water which is what it is.  I told her she should reduce her water intake for normal days and follow her thirst and watch the color of her urine.  It should be light yellow.    She will see Dr. Downs next week.

## 2020-06-22 ENCOUNTER — OFFICE VISIT (OUTPATIENT)
Dept: HEALTH INFORMATION MANAGEMENT | Facility: MEDICAL CENTER | Age: 65
End: 2020-06-22
Payer: MEDICARE

## 2020-06-22 VITALS
HEIGHT: 66 IN | WEIGHT: 291.2 LBS | OXYGEN SATURATION: 99 % | DIASTOLIC BLOOD PRESSURE: 78 MMHG | SYSTOLIC BLOOD PRESSURE: 124 MMHG | HEART RATE: 96 BPM | BODY MASS INDEX: 46.8 KG/M2

## 2020-06-22 DIAGNOSIS — E55.9 VITAMIN D DEFICIENCY: ICD-10-CM

## 2020-06-22 DIAGNOSIS — R63.2 BINGE EATING: ICD-10-CM

## 2020-06-22 DIAGNOSIS — E66.01 MORBID OBESITY WITH BMI OF 40.0-44.9, ADULT (HCC): ICD-10-CM

## 2020-06-22 DIAGNOSIS — E06.3 HYPOTHYROIDISM DUE TO HASHIMOTO'S THYROIDITIS: ICD-10-CM

## 2020-06-22 DIAGNOSIS — I10 ESSENTIAL HYPERTENSION: ICD-10-CM

## 2020-06-22 DIAGNOSIS — E03.8 HYPOTHYROIDISM DUE TO HASHIMOTO'S THYROIDITIS: ICD-10-CM

## 2020-06-22 DIAGNOSIS — G47.33 OBSTRUCTIVE SLEEP APNEA SYNDROME: ICD-10-CM

## 2020-06-22 PROCEDURE — 93000 ELECTROCARDIOGRAM COMPLETE: CPT | Performed by: INTERNAL MEDICINE

## 2020-06-22 PROCEDURE — 99215 OFFICE O/P EST HI 40 MIN: CPT | Performed by: INTERNAL MEDICINE

## 2020-06-22 RX ORDER — LEVOTHYROXINE SODIUM 0.12 MG/1
TABLET ORAL
COMMUNITY
End: 2020-07-02

## 2020-06-22 RX ORDER — EPINEPHRINE 0.3 MG/.3ML
INJECTION SUBCUTANEOUS
COMMUNITY
Start: 2020-04-28 | End: 2021-03-23 | Stop reason: SDUPTHER

## 2020-06-22 ASSESSMENT — PATIENT HEALTH QUESTIONNAIRE - PHQ9: CLINICAL INTERPRETATION OF PHQ2 SCORE: 0

## 2020-06-22 ASSESSMENT — FIBROSIS 4 INDEX: FIB4 SCORE: 0.96

## 2020-06-22 NOTE — PROGRESS NOTES
"Bariatric Medicine H&P  Chief Complaint   Patient presents with   • Weight Gain       Referred by:  Ghassan Mooney M.D.    History of Present Illness:   Adelaide Pereira is a 64 y.o.  female who presents for weight management and to help address co-morbidities caused by overweight, as below.    The patient is unclear why she is having so much difficulty losing weight.  She is currently on weight watchers, has been on a liquid diet in the distant past, reached her goal weight but then regained weight.  Exercise does help and tracking her intake.  She thinks she is on a lower carb diet, but admits she eats a lot of carbs including fruits and chips.    She does not see herself being able to tolerate VLCD diet, as she likes to socialize, especially go out for breakfast.    Brief Diet History (see also RD notes):  AM: Coffee, eggs  Lunch: Salad with vegetable, protein  Dinner: Protein, vegetables  Snacks: Chips, fruit with no restriction  Drinks: Water, coffee, tea  Allergies to nuts, fish, certain foods    HTN: On triamterene/hydrochlorothiazide  VDD: Vitamin D level normal, 6/2020, not on vitamin D supplement  Hypothyroid: Stabilized via endocrinology, monitor closely    Behavior-Related History:  Binge eating screen: Positive  H/o abuse: Negative     Exercise:   Gardening, plans to join a gym     Review of Systems   Positive for lower extremity edema, knee pain and swelling, intermittent cravings  Sleep apnea screen: Positive, on CPAP  All other ROS were reviewed with patient today and are negative.      PMH/PSH:  I have reviewed the patient's medical, social and family history, allergies, and medications today.  Prior records reviewed.  Personal Hx of Bariatric Surgery: Negative  Retired teacher    Physical Exam:   /78 (BP Location: Left arm, Patient Position: Sitting, BP Cuff Size: Large adult)   Pulse 96   Ht 1.676 m (5' 6\")   Wt (!) 132.1 kg (291 lb 3.2 oz)   SpO2 99%   BMI 47.00 kg/m²   Waist " Measurement   Waist: 53 inch/inches  Body fat % 55  REE 1858 kcal/day    Constitutional: Oriented to person, place, and time and well-developed, well-nourished, and in no distress.    HENT: No facial plethora.  No Cushingoid features.  No scalloped tongue.  No dental erosions.  No swollen parotids.  Head: Normocephalic.   Eyes: EOM are normal. Pupils are equal, round, and reactive to light. No periorbital edema.  No lateral thinning of eyebrows.  No vertical nystagmus.  Neck: Normal range of motion. Neck supple. No thyromegaly present. No buffalo hump.  Cardiovascular: Normal rate and regular rhythm.  No murmur heard.  Pulmonary/Chest: Effort normal and breath sounds normal. No wheezes.   Abdominal: Soft. Bowel sounds are normal.  Grade 1 pannus.  No ascites.  No hepatosplenomegaly.   Musculoskeletal: Normal range of motion. No edema.   Neurological: Alert and oriented to person, place, and time. Normal reflexes. No cranial nerve deficit. No muscle weakness.  Gait normal.   Skin: Warm and dry. Not diaphoretic. No hirsuitism.  No acanthosis nigricans.  Not excessively dry, scaly.  No acne.  No bruising/ecchymosis.  No hyperpigmentation.  No xanthomas or acrochordon.    Psychiatric: Mood, memory, affect and judgment normal.     Laboratory:   Prior labs reviewed.  EKG: Sinus rhythm, rate 69, no ST-T changes.  Corrected QT 0.425  Ordered, performed in our office today, and reviewed by me today.    Dietitian Assessment: I have reviewed the Dietitian's assessment related to this encounter.       ASSESSMENT/PLAN:  Body mass index is 47 kg/m².   Obesity Stage ([place]) 2; Class 3    1. Morbid obesity with BMI of 40.0-44.9, adult ([facility])     2. Vitamin D deficiency     3. Obstructive sleep apnea syndrome     4. Essential hypertension     5. Hypothyroidism due to Hashimoto's thyroiditis     6. Binge eating         On food recall, the patient's total kcal and CHO intake is too high, leading to persistent weight gain.  In  addition, her screening was positive for binge eating.  Strongly suggest anti-obesity medication which patient wants to consider and review at next visit.  To need to monitor blood pressure, sleep, vitamin D.  Endocrinology managing thyroid status.    The patient and I have discussed at length and agree to the following recommendations, which are all addressing the above diagnoses:    Weight Goal: 5% wt loss at one month after start (pt goal weight is 70 lb)  Diet:   Hold on weight watchers for now  MR:  Wants to consider for lunch or dinner occasionally  Likes to eat out for breakfast  High Protein/Low Carb Meals and 2 snacks between meals daily  >100 g protein, <100 g total carbs daily, less than 1400 kcals per day to start  Track daily intake with My Fitness Pal, bring to next visit  64+ oz water per day  Avoid excess fruit, refined carbs for snacks  Physical Activity:  Walking each am  Will be resuming gym this month, 3-4 times per week as goal  Risk level for moderate/vigorous exercise program:   Low  New Rx:   Consider phentermine/topiramate, pt to review consent form  Start very low dose  Behavior change:   Tracking intake, increasing activity  Follow-up: one week    Face to face time spent 60 minutes,  with >50% of time devoted to one on one counseling on weight management issues, as documented above.      Patient's body mass index is 47 kg/m². Exercise and nutrition counseling were performed at this visit.        Thank you for your referral!

## 2020-06-22 NOTE — Clinical Note
Thank you for your referral.  I appreciated meeting Adelaide and look forward to working with her further.

## 2020-06-29 ENCOUNTER — APPOINTMENT (OUTPATIENT)
Dept: HEALTH INFORMATION MANAGEMENT | Facility: MEDICAL CENTER | Age: 65
End: 2020-06-29
Payer: MEDICARE

## 2020-06-29 ENCOUNTER — HOSPITAL ENCOUNTER (OUTPATIENT)
Dept: RADIOLOGY | Facility: MEDICAL CENTER | Age: 65
End: 2020-06-29
Attending: INTERNAL MEDICINE
Payer: MEDICARE

## 2020-06-29 DIAGNOSIS — Z78.0 MENOPAUSE: ICD-10-CM

## 2020-06-29 PROCEDURE — 77080 DXA BONE DENSITY AXIAL: CPT

## 2020-07-02 ENCOUNTER — OFFICE VISIT (OUTPATIENT)
Dept: HEALTH INFORMATION MANAGEMENT | Facility: MEDICAL CENTER | Age: 65
End: 2020-07-02
Payer: MEDICARE

## 2020-07-02 VITALS
OXYGEN SATURATION: 96 % | HEIGHT: 66 IN | HEART RATE: 76 BPM | SYSTOLIC BLOOD PRESSURE: 126 MMHG | DIASTOLIC BLOOD PRESSURE: 82 MMHG | WEIGHT: 286.3 LBS | BODY MASS INDEX: 46.01 KG/M2

## 2020-07-02 DIAGNOSIS — E55.9 VITAMIN D DEFICIENCY: ICD-10-CM

## 2020-07-02 DIAGNOSIS — G47.33 OBSTRUCTIVE SLEEP APNEA SYNDROME: ICD-10-CM

## 2020-07-02 DIAGNOSIS — E66.01 MORBID OBESITY WITH BMI OF 40.0-44.9, ADULT (HCC): ICD-10-CM

## 2020-07-02 DIAGNOSIS — I10 ESSENTIAL HYPERTENSION: ICD-10-CM

## 2020-07-02 DIAGNOSIS — R63.2 BINGE EATING: ICD-10-CM

## 2020-07-02 PROCEDURE — 99213 OFFICE O/P EST LOW 20 MIN: CPT | Performed by: INTERNAL MEDICINE

## 2020-07-02 ASSESSMENT — PATIENT HEALTH QUESTIONNAIRE - PHQ9: CLINICAL INTERPRETATION OF PHQ2 SCORE: 0

## 2020-07-02 ASSESSMENT — FIBROSIS 4 INDEX: FIB4 SCORE: 0.96

## 2020-07-02 NOTE — PROGRESS NOTES
Bariatric Medicine Follow Up  Chief Complaint   Patient presents with   • Weight Gain       History of Present Illness:   Adelaide Pereira is a 64 y.o. female who presents for weight management follow-up and to help address co-morbidities caused by overweight, as below.    During the patient's last visit, the following were discussed and recommended:  Weight Goal: 5% wt loss at one month after start (pt goal weight is 170 lb)  Diet:   Hold on weight watchers for now  MR:  Wants to consider for lunch or dinner occasionally  Likes to eat out for breakfast  High Protein/Low Carb Meals and 2 snacks between meals daily  >100 g protein, <100 g total carbs daily, less than 1400 kcals per day to start  Track daily intake with My Fitness Pal, bring to next visit  64+ oz water per day  Avoid excess fruit, refined carbs for snacks  Physical Activity:  Walking each am  Will be resuming gym this month, 3-4 times per week as goal  Risk level for moderate/vigorous exercise program:   Low  New Rx:   Consider phentermine/topiramate, pt to review consent form  Start very low dose  Behavior change:   Tracking intake, increasing activity      The patient is so pleased with her weight loss since her last visit 2 weeks ago.  She feels she just needed to learn about carbohydrates, has never had that information before and feels finally on track with substantive weight loss.    She prefers to track with [employer] nimesh, loves it.  Reducing fruit intake but not eliminating.  She likes to write down in her notebook her calories carbs and protein, averaging around 1200 kcals per day, less than 50 g carbohydrates per day which is working very well for her.  Her protein intake is around 70 g/day, working on increasing that as well.    She is not feeling hungry, feels very satisfied, does not want to eat processed foods, not getting lightheaded.  Overall feels great with lots of energy, more mental clarity and less inflammation in her joints!    She uses  "her weight watchers nimesh to get tips on where to eat, where are healthy options.    RAISA: Sleep stable  VDD: Not currently requiring vitamin D supplement  HTN: Blood pressure well controlled on triamterene/hydrochlorothiazide    Exercise:   Walking, caring for her mother  Resuming gym, loves to go to the gym, starting next week     Review of Systems   Pt denies lightheadedness, weakness, worsening fatigue, excessive dry mouth, mood changes, paresthesias.  All other ROS were reviewed and are otherwise unchanged from my previous visit with patient.    Physical Exam:    /82 (BP Location: Left arm, Patient Position: Sitting, BP Cuff Size: Large adult)   Pulse 76   Ht 1.676 m (5' 6\")   Wt (!) 129.9 kg (286 lb 4.8 oz)   SpO2 96%   BMI 46.21 kg/m²   Waist Measurement   Waist: 48 inch/inches  Weight change since last visit: -5 lb   Waist Circum change since last visit: -5 in     Constitutional: Oriented to person, place, and time and well-developed, well-nourished, and in no distress.    Head: Normocephalic.   Musculoskeletal: Normal range of motion. No edema.   Neurological: Alert and oriented to person, place, and time. No muscle weakness.  Gait normal.   Skin: Warm and dry. Not diaphoretic.   Psychiatric: Mood, memory, affect and judgment normal.     Laboratory:   Recent labs reviewed.      Dietitian Assessment: n/a    ASSESSMENT/PLAN:  Body mass index is 46.21 kg/m².    Obesity Stage ([place]): 2; Class 3    1. Morbid obesity with BMI of 40.0-44.9, adult ([facility])     2. Obstructive sleep apnea syndrome     3. Vitamin D deficiency     4. Binge eating     5. Essential hypertension       The patient is making great progress already towards her weight loss goal.  She has significantly reduced refined carbohydrates, having less inflammation and more energy.    Continue to monitor sleep, vitamin D, blood pressure, currently controlled.    Resume activity as she has planned.    The patient and I have discussed at length " and agree to the following recommendations, which are all addressing the above diagnoses:    Weight Goal: 3-5% wt loss each month (pt goal weight is 170 lb)  Diet: Less than 1400 kcals per day, less than 50 g CHO per day per patient request, 70 to 80 g protein per day but can increase if hunger  1 ND MR for 1 meal occasionally if she would otherwise skip  Patient prefers continuing WW nimesh, not using points  Keeping written diary of total kcal/CHO/protein, which helps her most  Physical Activity:  Gym 4 d/wk starting next wk  Rx: Patient defers  Behavior change: has support via [employer] Connect  Follow-up: 2 wks    Patient's body mass index is 46.21 kg/m². Exercise and nutrition counseling were performed at this visit.

## 2020-07-07 ENCOUNTER — TELEPHONE (OUTPATIENT)
Dept: MEDICAL GROUP | Facility: PHYSICIAN GROUP | Age: 65
End: 2020-07-07

## 2020-07-07 PROBLEM — E87.1 HYPONATREMIA: Status: ACTIVE | Noted: 2020-07-07

## 2020-07-07 SDOH — HEALTH STABILITY: MENTAL HEALTH: HOW OFTEN DO YOU HAVE A DRINK CONTAINING ALCOHOL?: MONTHLY OR LESS

## 2020-07-07 SDOH — HEALTH STABILITY: MENTAL HEALTH: HOW MANY STANDARD DRINKS CONTAINING ALCOHOL DO YOU HAVE ON A TYPICAL DAY?: 1 OR 2

## 2020-07-07 SDOH — HEALTH STABILITY: MENTAL HEALTH: HOW OFTEN DO YOU HAVE 6 OR MORE DRINKS ON ONE OCCASION?: NEVER

## 2020-07-07 NOTE — ASSESSMENT & PLAN NOTE
Lab Results   Component Value Date/Time    SODIUM 129 (L) 06/05/2020 08:05 AM    POTASSIUM 4.1 06/05/2020 08:05 AM    CHLORIDE 91 (L) 06/05/2020 08:05 AM    CO2 27 06/05/2020 08:05 AM    ANION 11.0 06/05/2020 08:05 AM    GLUCOSE 90 06/05/2020 08:05 AM    BUN 9 06/05/2020 08:05 AM    CREATININE 0.41 (L) 06/05/2020 08:05 AM    CREATININE 0.59 02/08/2013 08:05 AM    CALCIUM 9.2 06/05/2020 08:05 AM    ASTSGOT 19 06/05/2020 08:05 AM    ALTSGPT 28 06/05/2020 08:05 AM    TBILIRUBIN 0.6 06/05/2020 08:05 AM    ALBUMIN 4.3 06/05/2020 08:05 AM    TOTPROTEIN 7.1 06/05/2020 08:05 AM    GLOBULIN 2.8 06/05/2020 08:05 AM    AGRATIO 1.5 06/05/2020 08:05 AM   ]  Addressed with Dr. Lopez- told her that she's drinking too much water.

## 2020-07-07 NOTE — TELEPHONE ENCOUNTER
Future Appointments       Provider Department Center    7/8/2020 9:00 AM Shey Pastrana P.A.-C. [facility]    8/4/2020 11:20 AM Ghassan Mooney M.D. [facility]inology [address] [place]    8/5/2020 8:30 AM Vy Vazquez M.D. [facility]        NEW PATIENT VISIT PRE-VISIT PLANNING    1.  EpicCare Patient is checked in Patient Demographics? YES    2.  Immunizations were updated in [facility] using WebIZ?: Yes       •  Web Iz Recommendations: FLU, PNEUMOVAX (PPSV23), VARICELLA (Chicken Pox)  and SHINGRIX (Shingles)    3.  Is this appointment scheduled as a Hospital Follow-Up? No    4.  Patient is due for the following Health Maintenance Topics:   Health Maintenance Due   Topic Date Due   • Annual Wellness Visit  1955   • HEPATITIS C SCREENING  1955   • COLONOSCOPY  08/13/2003   • IMM ZOSTER VACCINES (1 of 2) 08/13/2003   • IMM PNEUMOCOCCAL VACCINE: 65+ Years (2 of 2 - PPSV23) 05/15/2020       5. Orders for overdue Health Maintenance topics pended in Pre-Charting? NO    6.  Reviewed/Updated the following with patient:   •   Preferred Pharmacy? YES       •   Preferred Lab? YES       •   Preferred Communication? YES       •   Allergies? YES       •   Medications? YES. Was Abstract Encounter opened and chart updated? YES       •   Social History? YES. Was Abstract Encounter opened and chart updated? YES       •   Family History (document living status of immediate family members and if + hx of cancer, diabetes, hypertension, hyperlipidemia, heart attack, stroke) YES. Was Abstract Encounter opened and chart updated? YES    7.  Updated Care Team?       •   DME Company (gait device, O2, CPAP, etc.) YES- CPAP       •   Other Specialists (eye doctor, derm, GYN, cardiology, endo, etc): YES    8.  Patient was informed to arrive 15 min prior to their   scheduled appointment and bring in their medication bottles.  07/07/2020- NIKKIE

## 2020-07-08 ENCOUNTER — OFFICE VISIT (OUTPATIENT)
Dept: MEDICAL GROUP | Facility: PHYSICIAN GROUP | Age: 65
End: 2020-07-08
Payer: MEDICARE

## 2020-07-08 VITALS
HEIGHT: 66 IN | WEIGHT: 286.3 LBS | RESPIRATION RATE: 12 BRPM | OXYGEN SATURATION: 96 % | TEMPERATURE: 99.2 F | DIASTOLIC BLOOD PRESSURE: 64 MMHG | HEART RATE: 69 BPM | BODY MASS INDEX: 46.01 KG/M2 | SYSTOLIC BLOOD PRESSURE: 122 MMHG

## 2020-07-08 DIAGNOSIS — E04.2 MULTIPLE THYROID NODULES: ICD-10-CM

## 2020-07-08 DIAGNOSIS — Z23 NEED FOR VACCINATION: ICD-10-CM

## 2020-07-08 DIAGNOSIS — Z91.018 FOOD ALLERGY: ICD-10-CM

## 2020-07-08 DIAGNOSIS — E87.1 HYPONATREMIA: ICD-10-CM

## 2020-07-08 DIAGNOSIS — I10 ESSENTIAL HYPERTENSION: ICD-10-CM

## 2020-07-08 DIAGNOSIS — E06.3 HYPOTHYROIDISM DUE TO HASHIMOTO'S THYROIDITIS: ICD-10-CM

## 2020-07-08 DIAGNOSIS — G47.33 OBSTRUCTIVE SLEEP APNEA SYNDROME: ICD-10-CM

## 2020-07-08 DIAGNOSIS — E03.8 HYPOTHYROIDISM DUE TO HASHIMOTO'S THYROIDITIS: ICD-10-CM

## 2020-07-08 DIAGNOSIS — E55.9 VITAMIN D DEFICIENCY: ICD-10-CM

## 2020-07-08 DIAGNOSIS — E66.01 MORBID OBESITY WITH BMI OF 45.0-49.9, ADULT (HCC): ICD-10-CM

## 2020-07-08 PROCEDURE — 99204 OFFICE O/P NEW MOD 45 MIN: CPT | Mod: 25 | Performed by: PHYSICIAN ASSISTANT

## 2020-07-08 PROCEDURE — 90732 PPSV23 VACC 2 YRS+ SUBQ/IM: CPT | Performed by: PHYSICIAN ASSISTANT

## 2020-07-08 PROCEDURE — G0009 ADMIN PNEUMOCOCCAL VACCINE: HCPCS | Performed by: PHYSICIAN ASSISTANT

## 2020-07-08 ASSESSMENT — FIBROSIS 4 INDEX: FIB4 SCORE: 0.96

## 2020-07-08 NOTE — ASSESSMENT & PLAN NOTE
On triamterene-HCTZ 37.5-25 mg. 1/2 tab a day.   Chronic condition.   Tolerates medication without issue.   BP log at home - doesn't usually check at home. Excellent here today.

## 2020-07-08 NOTE — ASSESSMENT & PLAN NOTE
06/2020:     IMPRESSION:     1.  There has been slight decrease in size of the right mid thyroid gland nodule.  2.  The right lower pole and left mid thyroid gland nodules are relatively stable to questionably slightly increased although this could be due to differences in measurement caliper placement.  3.  The relative stability suggests these are benign lesions.

## 2020-07-08 NOTE — ASSESSMENT & PLAN NOTE
This is a  chronic condition.   Supplementation: OTC supplementation  Last Vitamin D level:   VIT D:   Lab Results   Component Value Date/Time    25HYDROXY 39 06/05/2020 0805     Patient  denies any muscle aches or fatigue.

## 2020-07-08 NOTE — ASSESSMENT & PLAN NOTE
This is a  chronic condition.   Onset: years ago  Last TSH level:   TSH   Date Value Ref Range Status   06/05/2020 0.695 0.380 - 5.330 uIU/mL Final     Comment:     Please note new reference ranges effective 12/14/2017 10:00 AM  Pregnant Females, 1st Trimester  0.050-3.700  Pregnant Females, 2nd Trimester  0.310-4.350  Pregnant Females, 3rd Trimester  0.410-5.180       Current Medication: Synthroid BRAND NAME- Goes through [facility] pharmacy.   Side effects to medication: none  Denies any  chronic fatigue, constipation, dry skin, heat/cold intolerance, or hair loss.     Sees Dr. Lopez as endocrinologist currently. I will take over in future. Where she is rigtht now with TSH- she feels good. She is still having some nail issues, cold issues, and dry skin. Going to Dr. Lopez to address.

## 2020-07-08 NOTE — PROGRESS NOTES
CC:    Chief Complaint   Patient presents with   • Establish Care        HISTORY OF THE PRESENT ILLNESS: Patient is a 64 y.o. female. This pleasant patient is here today establish care.     Health Maintenance: Completed  Did AWV with Dr. Krysten Peña 2020, will request records.   Last colonoscopy 2014- on 10 year list.   Pap 05/2020- done-normal   Does shingles prevention- homeopathically- when she got [place] measles- got measles.   Flu shot - homeopathically.       Annual Health Assessment Questions:    1.  Are you currently engaging in any exercise or physical activity? Yes    2.  How would you describe your mood or emotional well-being today? good    3.  Have you had any falls in the last year? Yes- last august- dog pulled her down    4.  Have you noticed any problems with your balance or had difficulty walking? Yes- Zack Chi helps.     5.  In the last six months have you experienced any leakage of urine? No    6. DPA/Advanced Directive: Patient has Advanced Directive, but it is not on file. Instructed to bring in a copy to scan into their chart.  Just had it done- got a copy during appointment.       Hyponatremia  Lab Results   Component Value Date/Time    SODIUM 129 (L) 06/05/2020 08:05 AM    POTASSIUM 4.1 06/05/2020 08:05 AM    CHLORIDE 91 (L) 06/05/2020 08:05 AM    CO2 27 06/05/2020 08:05 AM    ANION 11.0 06/05/2020 08:05 AM    GLUCOSE 90 06/05/2020 08:05 AM    BUN 9 06/05/2020 08:05 AM    CREATININE 0.41 (L) 06/05/2020 08:05 AM    CREATININE 0.59 02/08/2013 08:05 AM    CALCIUM 9.2 06/05/2020 08:05 AM    ASTSGOT 19 06/05/2020 08:05 AM    ALTSGPT 28 06/05/2020 08:05 AM    TBILIRUBIN 0.6 06/05/2020 08:05 AM    ALBUMIN 4.3 06/05/2020 08:05 AM    TOTPROTEIN 7.1 06/05/2020 08:05 AM    GLOBULIN 2.8 06/05/2020 08:05 AM    AGRATIO 1.5 06/05/2020 08:05 AM   ]  Addressed with Dr. Lopez- told her that she's drinking too much water.     Hypertension  On triamterene-HCTZ 37.5-25 mg. 1/2 tab a day.   Chronic condition.    Tolerates medication without issue.   BP log at home - doesn't usually check at home. Excellent here today.     Hypothyroidism  This is a  chronic condition.   Onset: years ago  Last TSH level:   TSH   Date Value Ref Range Status   06/05/2020 0.695 0.380 - 5.330 uIU/mL Final     Comment:     Please note new reference ranges effective 12/14/2017 10:00 AM  Pregnant Females, 1st Trimester  0.050-3.700  Pregnant Females, 2nd Trimester  0.310-4.350  Pregnant Females, 3rd Trimester  0.410-5.180       Current Medication: Synthroid BRAND NAME- Goes through [facility] pharmacy.   Side effects to medication: none  Denies any  chronic fatigue, constipation, dry skin, heat/cold intolerance, or hair loss.     Sees Dr. Lopez as endocrinologist currently. I will take over in future. Where she is rigtht now with TSH- she feels good. She is still having some nail issues, cold issues, and dry skin. Going to Dr. Lopez to address.     Sleep apnea  Has positional sleep apnea. But uses cpap machine.     Food allergy  Has epi pens on hand, allergic to nuts and fish.     Vitamin D deficiency  This is a  chronic condition.   Supplementation: OTC supplementation  Last Vitamin D level:   VIT D:   Lab Results   Component Value Date/Time    25HYDROXY 39 06/05/2020 0805     Patient  denies any muscle aches or fatigue.       Morbid obesity with BMI of 45.0-49.9, adult (HCC)  Seeing specialist for weight loss. Dr. Carol Marie.   She had escalation of weight when she was put on Wellbutrin after her  passed away. She states she has a balanced diet, eats plenty of vegetables, has her own vegetable garden.   Dr. Carol Marie found that she was carb intolerant. She is doing calorie counting. Doing < 100 carbs a day. Doing ample protein > 100 day.   Goes to body fuel routinely. Exercises regularly. Goes Zack Chi as well, and yoga- but closed right now.   Goal weight: 170 pounds  Highest Weight: 315 pounds    Multiple thyroid  nodules  US 06/2020:     IMPRESSION:     1.  There has been slight decrease in size of the right mid thyroid gland nodule.  2.  The right lower pole and left mid thyroid gland nodules are relatively stable to questionably slightly increased although this could be due to differences in measurement caliper placement.  3.  The relative stability suggests these are benign lesions.    Allergies: Nitrofurantoin; Aspirin; Codeine; Fish; Maxepa; and Peanut oil    Current Outpatient Medications Ordered in Epic   Medication Sig Dispense Refill   • EPINEPHrine (EPIPEN) 0.3 MG/0.3ML Solution Auto-injector solution for injection INJECT INTO THIGH UTD     • triamterene-hctz (MAXZIDE-25/DYAZIDE) 37.5-25 MG Tab every day. Taking 1/2 a tab  0   • SYNTHROID 125 MCG TABS TAKE 1 TABLET BY MOUTH EVERY DAY 30 Tab 6     No current Epic-ordered facility-administered medications on file.        Past Medical History:   Diagnosis Date   • Chickenpox    • [place] measles    • Hyperlipidemia    • Hypothyroidism    • Influenza    • Mumps    • Obesity    • Sleep apnea 2011    on CPAP   • thyroid nodules 2000    biopsy Hashimoto's thyroiditis / TPO ab neg       Past Surgical History:   Procedure Laterality Date   • PRIMARY C SECTION         Social History     Tobacco Use   • Smoking status: Never Smoker   • Smokeless tobacco: Never Used   Substance Use Topics   • Alcohol use: Yes     Frequency: Monthly or less     Drinks per session: 1 or 2     Binge frequency: Never   • Drug use: No       Social History     Social History Narrative   • Not on file       Family History   Problem Relation Age of Onset   • Thyroid Mother    • Cancer Mother         breast   • Heart Attack Father    • Sleep Apnea Father    • Arterial Aneurysm Father    • Sleep Apnea Brother    • Cancer Maternal Aunt         breast   • No Known Problems Sister    • Diabetes Maternal Grandmother    • No Known Problems Son    • Thyroid Daughter        ROS:   Constitutional: Patient denies  "any fever, chills, night sweats, struggling with weight gain see HPI.    Eyes: Patient denies any photophobia, eye pain, diplopia, discharge, dryness, excessive tearing, redness, or VA changes.   ENT: Patient denies any runny nose, hoarseness, sore throat, or dysphagia.   Resp: Patient denies cough, wheezing, or shortness of breath.   CV: Patient denies any chest pain, claudication, cyanosis, palpitations, or edema.   GI: Patient denies any constipation, nausea, vomiting, diarrhea, bloating, abdominal pain, or dyspepsia.   MSK: Patient denies any cramps, swelling, weakness, or tremor  Skin: Patient denies any color changes, skin changes, lesions, ulcerations, wounds, pruritus, hair or nail changes.   Neuro: Patient denies any headache, numbness or tingling  Psych: Patient denies any suicidal or homicidal ideation, depression or anxiety.       Exam: /64   Pulse 69   Temp 37.3 °C (99.2 °F) (Temporal)   Resp 12   Ht 1.676 m (5' 6\")   Wt (!) 129.9 kg (286 lb 4.8 oz)   SpO2 96%  Body mass index is 46.21 kg/m².    GENERAL: No acute distress, appropriately dressed. Well developed female.   HENT: Atraumatic, normocephalic, EAC's clear without impaction. TM's pearly gray and translucent.   EYES: Extraocular movements intact, pupils equal and reactive to light  NECK: Supple, FROM. No thyromegaly appreciated. No lymphadenopathy (submandibular, anterior/posterior cervical, and supraclavicular lymph nodes palpated) or masses. No bruits appreciated.   CHEST: No deformities, Equal chest expansion  HEART: Regular rate and rhythm, no murmur  RESP: Clear to auscultation bilaterally without wheezes, rales or rhonchi.   ABD: Soft, Nontender, Non-Distended  Extremities: No Clubbing, Cyanosis, or Edema  Skin: Warm/dry, no rashes.   Neuro: A/O x 4, Motor/sensory grossly intact  Psych: Normal behavior, normal affect      Assessment/Plan:  1. Hyponatremia  Dr. Lopez addressed.  Patient drinking too much water.  She is cut " back.  He has labs for her to repeat.  2. Need for vaccination  - Pneumovax Vaccine (PPSV23)    3. Essential hypertension  Chronic condition.  Stable.  Continue triamterene-hydrochlorothiazide 37.5-25 mg half tab once a day.  4. Hypothyroidism due to Hashimoto's thyroiditis  Chronic condition.  Stable.  She is following up with Dr. Lopez for some symptoms of cold intolerance, dry skin, nail changes.  Discussed with patient today that her T3 is running on the lower end of normal she could talk to her endocrinologist about trial of Cytomel with the Synthroid.  She will follow-up with me after her appointment with Dr. Lopez to review the plan.  5. Obstructive sleep apnea syndrome  Chronic condition controlled.  Uses CPAP machine.  6. Food allergy  Allergic to nuts and fish has EpiPen.  7. Vitamin D deficiency  Chronic condition.  Continue over-the-counter vitamin D supplementation  8. Morbid obesity with BMI of 45.0-49.9, adult ([facility])  Patient being seen by a specialist Dr. Bhatia.  They are working on a dietary plan reducing carbs because patient found out she is carb intolerant.  Her goal weight is to get down to 170 pounds.  She is very diligent with her diet and exercise regimen, she has a great attitude about this weight loss journey.  9. Multiple thyroid nodules  History of multiple thyroid micronodules, history of Hashimoto's.  Thyroid ultrasound from 2020 reviewed stable nodules.  We will repeat in a year.    Follow-up: Return in about 8 weeks (around 9/2/2020).    Please note that this dictation was created using voice recognition software. I have made every reasonable attempt to correct obvious errors, but I expect that there are errors of grammar and possibly content that I did not discover before finalizing the note.

## 2020-07-08 NOTE — ASSESSMENT & PLAN NOTE
Seeing specialist for weight loss. Dr. Carol Marie.   She had escalation of weight when she was put on Wellbutrin after her  passed away. She states she has a balanced diet, eats plenty of vegetables, has her own vegetable garden.   Dr. Carol Marie found that she was carb intolerant. She is doing calorie counting. Doing < 100 carbs a day. Doing ample protein > 100 day.   Goes to body [employer] routinely. Exercises regularly. Goes Zack Chi as well, and yoga- but closed right now.   Goal weight: 170 pounds  Highest Weight: 315 pounds

## 2020-08-04 ENCOUNTER — OFFICE VISIT (OUTPATIENT)
Dept: ENDOCRINOLOGY | Facility: MEDICAL CENTER | Age: 65
End: 2020-08-04
Attending: INTERNAL MEDICINE
Payer: MEDICARE

## 2020-08-04 VITALS
WEIGHT: 280 LBS | SYSTOLIC BLOOD PRESSURE: 132 MMHG | HEART RATE: 80 BPM | HEIGHT: 66 IN | DIASTOLIC BLOOD PRESSURE: 92 MMHG | BODY MASS INDEX: 45 KG/M2

## 2020-08-04 DIAGNOSIS — E06.3 HYPOTHYROIDISM DUE TO HASHIMOTO'S THYROIDITIS: ICD-10-CM

## 2020-08-04 DIAGNOSIS — E04.2 MULTIPLE THYROID NODULES: ICD-10-CM

## 2020-08-04 DIAGNOSIS — E03.8 HYPOTHYROIDISM DUE TO HASHIMOTO'S THYROIDITIS: ICD-10-CM

## 2020-08-04 PROCEDURE — 99213 OFFICE O/P EST LOW 20 MIN: CPT | Performed by: INTERNAL MEDICINE

## 2020-08-04 PROCEDURE — 99211 OFF/OP EST MAY X REQ PHY/QHP: CPT | Performed by: INTERNAL MEDICINE

## 2020-08-04 RX ORDER — ERGOCALCIFEROL 1.25 MG/1
CAPSULE ORAL
COMMUNITY

## 2020-08-04 ASSESSMENT — FIBROSIS 4 INDEX: FIB4 SCORE: 0.96

## 2020-08-05 ENCOUNTER — OFFICE VISIT (OUTPATIENT)
Dept: HEALTH INFORMATION MANAGEMENT | Facility: MEDICAL CENTER | Age: 65
End: 2020-08-05
Payer: MEDICARE

## 2020-08-05 VITALS
SYSTOLIC BLOOD PRESSURE: 124 MMHG | HEART RATE: 73 BPM | OXYGEN SATURATION: 93 % | WEIGHT: 288.5 LBS | HEIGHT: 66 IN | BODY MASS INDEX: 46.36 KG/M2 | DIASTOLIC BLOOD PRESSURE: 80 MMHG

## 2020-08-05 DIAGNOSIS — I10 ESSENTIAL HYPERTENSION: ICD-10-CM

## 2020-08-05 DIAGNOSIS — K59.00 CONSTIPATION, UNSPECIFIED CONSTIPATION TYPE: ICD-10-CM

## 2020-08-05 DIAGNOSIS — E66.01 MORBID OBESITY WITH BMI OF 45.0-49.9, ADULT (HCC): ICD-10-CM

## 2020-08-05 DIAGNOSIS — R63.2 BINGE EATING: ICD-10-CM

## 2020-08-05 DIAGNOSIS — Z71.3 DIETARY COUNSELING AND SURVEILLANCE: ICD-10-CM

## 2020-08-05 DIAGNOSIS — G47.33 OBSTRUCTIVE SLEEP APNEA SYNDROME: ICD-10-CM

## 2020-08-05 DIAGNOSIS — E55.9 VITAMIN D DEFICIENCY: ICD-10-CM

## 2020-08-05 PROCEDURE — G0447 BEHAVIOR COUNSEL OBESITY 15M: HCPCS | Performed by: INTERNAL MEDICINE

## 2020-08-05 PROCEDURE — 99214 OFFICE O/P EST MOD 30 MIN: CPT | Mod: 25 | Performed by: INTERNAL MEDICINE

## 2020-08-05 ASSESSMENT — FIBROSIS 4 INDEX: FIB4 SCORE: 0.96

## 2020-08-05 NOTE — PROGRESS NOTES
"Chief Complaint   Patient presents with   • Hypothyroidism     Autoimmune thyroiditis   • Thyroid Follow-up     Multiple nodules        HPI:    See assessment and recommendations below    ROS:  Patient is very pleased with her consultation with Dr. Bhatia and anticipates success with weight control.    Bone density done in June was excellent placing her in the normal range for a young adult.      Allergies:   Allergies   Allergen Reactions   • Nitrofurantoin Rash   • Aspirin    • Codeine    • Fish    • Maxepa    • Peanut Oil        Current medicines including changes today:  Current Outpatient Medications   Medication Sig Dispense Refill   • Coenzyme Q10 (CO Q 10 PO) Take  by mouth.     • Fexofenadine HCl (ALLEGRA ALLERGY PO) Take  by mouth.     • Fluticasone Propionate (FLONASE NA) Spray  in nose.     • ELDERBERRY PO Take  by mouth.     • vitamin D, Ergocalciferol, (DRISDOL) 1.25 MG (53010 UT) Cap capsule Take  by mouth every 7 days.     • Flaxseed, Linseed, (FLAX SEED OIL PO) Take  by mouth.     • Probiotic Product (PROBIOTIC-10 PO) Take  by mouth.     • EPINEPHrine (EPIPEN) 0.3 MG/0.3ML Solution Auto-injector solution for injection INJECT INTO THIGH UTD     • triamterene-hctz (MAXZIDE-25/DYAZIDE) 37.5-25 MG Tab every day. Taking 1/2 a tab  0   • SYNTHROID 125 MCG TABS TAKE 1 TABLET BY MOUTH EVERY DAY 30 Tab 6     No current facility-administered medications for this visit.         Past Medical History:   Diagnosis Date   • Chickenpox    • [place] measles    • Hyperlipidemia    • Hypothyroidism    • Influenza    • Mumps    • Obesity    • Sleep apnea 2011    on CPAP   • thyroid nodules 2000    biopsy Hashimoto's thyroiditis / TPO ab neg       PHYSICAL EXAM:    /92 (BP Location: Left arm, Patient Position: Sitting, BP Cuff Size: Adult)   Pulse 80   Ht 1.676 m (5' 5.98\")   Wt (!) 127 kg (280 lb) Comment: STATED  BMI 45.22 kg/m²   Examination is limited due to COVID-19 restrictions.    Gen.   morbid " obesity but otherwise appears healthy     Skin   appropriate for sex and age    HEENT  unremarkable    Neck   not able to palpate thyroid nodules    Heart  regular    Extremities  no edema    Neuro  gait and station normal    Psych  appropriate    ASSESSMENT AND RECOMMENDATIONS    1. Hypothyroidism due to Hashimoto's thyroiditis             Currently taking Synthroid 125 mcg/day.             Current TSH is 0.6 and free thyroxine upper normal at 1.7 and T3 hormone low normal at 65 ()             It was suggested to the patient that she might do better with some T3 hormone supplement to go along with the T4.  I certainly have no objection to trying that.  I would say she should lower the Synthroid dose to 112 to accommodate the addition of Cytomel 5 mcg.  I do think it would be worth it to see if it helps her metabolism.  She will take this up with her new PCP.    2. Multiple thyroid nodules             Thyroid gland is asymptomatic. Patient is not aware of any change in her gland. No discomfort. No difficulty swallowing, breathing, or voice change.             Ultrasound in June documents multiple stable subcentimeter nodules with no biopsy indicated.              Recommend repeat June 2021       DISPOSITION: Follow-up with new PCP                             She feels a good relationship there and with Dr. Jannette Mooney M.D.    Copies to: DEYSI Matute-C. [phone]

## 2020-08-05 NOTE — PROGRESS NOTES
Bariatric Medicine Follow Up  Chief Complaint   Patient presents with   • Weight Gain       History of Present Illness:   Adelaide Pereira is a 64 y.o. female who presents for follow-up to intensive behavioral modification of overweight and to help address co-morbidities caused by overweight, as below.    Obesity Stage/Class: 2/3  During the patient's last visit, the following were discussed and recommended:  Weight Goal: 3-5% wt loss each month (pt goal weight is 170 lb)  Diet: Less than 1400 kcals per day, less than 50 g CHO per day per patient request, 70 to 80 g protein per day but can increase if hunger  1 ND MR for 1 meal occasionally if she would otherwise skip  Patient prefers continuing WW nimesh, not using points  Keeping written diary of total kcal/CHO/protein, which helps her most  Physical Activity:  Gym 4 d/wk starting next wk  Rx: Patient defers  Behavior change: has support via [facility]      Challenges:  She is frustrated by lack of progress  She feels strongly she has insulin resistance although her fasting glucose is normal  Dietary adherence: Excellent  Tracking with WW but not points, writing down protein 93 g/CHO 50 g/1300 kcal  Light/fit yogurt, apple, eggs, milk, chicken/salad, celery/butter, jerky/cheese  Exercise:  qam yardwork    Medication use: none  Wants to use herbs, then consider metformin, which has worked for multiple family members of hers  Medication efficacy/tolerance/side effects: N/A  Medication compliance: N/A    Status of comorbid conditions/other medical diagnoses:  HTN: Controlled, on triamterene/hydrochlorothiazide  RAISA: Controlled, on CPAP  VDD: Controlled, on weekly vitamin D       Review of Systems   Pt denies lightheadedness, weakness, worsening fatigue, excessive dry mouth, mood changes, paresthesias.  Recent constipation  All other ROS were reviewed and are otherwise unchanged from my previous visit with patient.    Physical Exam:    /80 (BP Location: Left arm,  "Patient Position: Sitting, BP Cuff Size: Large adult)   Pulse 73   Ht 1.676 m (5' 6\")   Wt (!) 130.9 kg (288 lb 8 oz)   SpO2 93%   BMI 46.57 kg/m²   Waist Measurement   Waist: 51 inch/inches  Weight change since last visit: +2 lb (-3 lb total)  Waist Circum change since last visit: +3 in (-2 in total)    Constitutional: Oriented to person, place, and time and well-developed, well-nourished, and in no distress.    Head: Normocephalic.   Musculoskeletal: Normal range of motion. No edema.   Neurological: Alert and oriented to person, place, and time. No muscle weakness.  Gait normal.   Skin: Warm and dry. Not diaphoretic.   Psychiatric: Mood, memory, affect and judgment normal.     Laboratory:   Recent labs reviewed.      Dietitian Assessment: I have reviewed the Dietitian's assessment related to this encounter.     ASSESSMENT  64 y.o.  female presents for intensive lifestyle intervention for treatment of obesity and co-morbid conditions.  Obesity Stage ([place]) 2; Class 3    1. Binge eating     2. Essential hypertension     3. Obstructive sleep apnea syndrome     4. Vitamin D deficiency     5. Morbid obesity with BMI of 45.0-49.9, adult (HCC)  INSULIN FASTING   6. Constipation, unspecified constipation type         The patient is frustrated by weight regain, after initial weight loss her first month on the program.  She is tracking, eating within prescribed macronutrient goals.  Encouraged her to increase activity.  Will check fasting insulin, try low-dose metformin, potentially some medication induced weight gain, which metformin can help with.  Monitor blood pressure, sleep, vitamin D.    PLAN  Weight Goal: 5% wt loss at one month after start (pt goal weight is 170 lb)  Dietary intervention:    MR: 1 ND q. OD  High Protein/Low Carb whole food meals and 2 snacks between meals daily  >100 g protein, <100 g total carbs daily, 1400 kcal/d   Track daily intake with written journal, bring to next visit  64+ oz " water per day  Physical Activity:  Goal is 45 min daily of brisk walking or yardwork minimum  Labs:  Fasting insulin  Rx changes:   Pt wants to use herbs  If no change in wt after two wk start metformin for possible medication related wt gain  Consider also GLP1 agonist, phentermine/topiramate  Metamucil daily or orlistat 60 mg tid prn constipation  Behavior modification:   Increase activity  Surgical considerations: Consider referral to bariatric surgery pending course with medical weight loss  Follow-up: one month with MD, weekly to biweekly for preventive obesity counseling    Patient's body mass index is 46.57 kg/m². Exercise and nutrition counseling were performed at this visit.        >>>>>>>>>>>>>      PREVENTIVE SERVICES COUNSELING FOR OBESITY NOTE    O:  Body mass index is 46.57 kg/m²., see also vitals flowsheet for today  Pt struggling with:  Stress management  Cravings  Keeping food diary/tracking consumption  Meal planning  Meal prep  Taking medications as prescribed  A:  Dietary counseling and surveillance for obesity  Z71.3, E66.9, BMI Code: Z68.42  P:  Counseled pt on reduced kcal, reduced refined CHO whole food meal plan  Advised exercise: Walking, yardwork  Discussed strategies of self-monitoring with:   food diary  cognitive restructuring  stress reduction  stimulus control  Meal pre-planning  Environmental management  Time: 14 min

## 2020-08-07 ENCOUNTER — HOSPITAL ENCOUNTER (OUTPATIENT)
Dept: LAB | Facility: MEDICAL CENTER | Age: 65
End: 2020-08-07
Attending: INTERNAL MEDICINE
Payer: MEDICARE

## 2020-08-07 DIAGNOSIS — E66.01 MORBID OBESITY WITH BMI OF 45.0-49.9, ADULT (HCC): ICD-10-CM

## 2020-08-07 PROCEDURE — 83525 ASSAY OF INSULIN: CPT

## 2020-08-09 LAB — INSULIN P FAST SERPL-ACNC: 13 UIU/ML (ref 3–19)

## 2020-09-02 ENCOUNTER — OFFICE VISIT (OUTPATIENT)
Dept: MEDICAL GROUP | Facility: PHYSICIAN GROUP | Age: 65
End: 2020-09-02
Payer: MEDICARE

## 2020-09-02 VITALS
HEART RATE: 89 BPM | HEIGHT: 66 IN | RESPIRATION RATE: 14 BRPM | TEMPERATURE: 98.4 F | DIASTOLIC BLOOD PRESSURE: 74 MMHG | BODY MASS INDEX: 45 KG/M2 | SYSTOLIC BLOOD PRESSURE: 132 MMHG | OXYGEN SATURATION: 95 % | WEIGHT: 280 LBS

## 2020-09-02 DIAGNOSIS — E03.8 HYPOTHYROIDISM DUE TO HASHIMOTO'S THYROIDITIS: ICD-10-CM

## 2020-09-02 DIAGNOSIS — E06.3 HYPOTHYROIDISM DUE TO HASHIMOTO'S THYROIDITIS: ICD-10-CM

## 2020-09-02 DIAGNOSIS — E66.01 MORBID OBESITY WITH BMI OF 45.0-49.9, ADULT (HCC): ICD-10-CM

## 2020-09-02 PROCEDURE — 99213 OFFICE O/P EST LOW 20 MIN: CPT | Performed by: PHYSICIAN ASSISTANT

## 2020-09-02 RX ORDER — LEVOTHYROXINE SODIUM 112 UG/1
112 TABLET ORAL
Qty: 30 TAB | Refills: 2 | Status: SHIPPED | OUTPATIENT
Start: 2020-09-02 | End: 2020-09-02

## 2020-09-02 RX ORDER — LEVOTHYROXINE SODIUM 88 UG/1
88 TABLET ORAL
Qty: 30 TAB | Refills: 2 | Status: SHIPPED | OUTPATIENT
Start: 2020-09-02 | End: 2020-09-02

## 2020-09-02 RX ORDER — LEVOTHYROXINE SODIUM 112 UG/1
112 TABLET ORAL
Qty: 30 TAB | Refills: 2 | Status: SHIPPED | OUTPATIENT
Start: 2020-09-02 | End: 2020-11-09 | Stop reason: CLARIF

## 2020-09-02 RX ORDER — LIOTHYRONINE SODIUM 5 UG/1
5 TABLET ORAL DAILY
Qty: 30 TAB | Refills: 2 | Status: SHIPPED | OUTPATIENT
Start: 2020-09-02 | End: 2020-11-09

## 2020-09-02 ASSESSMENT — FIBROSIS 4 INDEX: FIB4 SCORE: 0.98

## 2020-09-02 NOTE — PROGRESS NOTES
CC:   Chief Complaint   Patient presents with   • Medication Refill   • Hypothyroidism          HISTORY OF PRESENT ILLNESS: Patient is a 65 y.o. female established patient who presents today to follow-up on labs from Dr. Lopez.    Health Maintenance: Completed    Hypothyroidism  Dr. Mooney consult note from August 2020 reviewed.  Patient and Dr. Lopez were agreeable to trial of Cytomel 5 mcg.  Because her TSH is at the lower end of normal he did recommend that we reduce her Synthroid to 112 mcg to accommodate the Cytomel.  Patient's T3 is at the lower end of normal.  She complains of difficulty growing her nails, difficulty losing weight and heat and cold intolerance.  I am hopeful that the Cytomel would improve her symptoms.  In the past she has had difficulty with lowering her Synthroid dose.  We will have to watch this carefully.  She has had mood changes in the past.    Morbid obesity with BMI of 45.0-49.9, adult (HCC)  Patient continue to follow-up with Dr. Bhatia.  She had to reduce her protein intake, she was advised to do 100 g a day but this was too much and she was actually gaining weight.  Patient has reduced her protein to about 70 g a day.  She is trying to keep her overall caloric intake at about  1200 shaylee.  She has found that since reducing the protein she is started to reduce weight again.  Patient does have a genetic history of obesity as well.  We discussed again today adding Cytomel to her regimen to see if increasing her active thyroid hormone will help her ability to lose weight.  Again patient is very diligent with her diet, and she works out regularly.      Patient Active Problem List    Diagnosis Date Noted   • Food allergy 07/08/2020   • Multiple thyroid nodules 07/08/2020   • Hyponatremia 07/07/2020   • Binge eating 06/22/2020   • Non-smoker 06/10/2019   • Morbid obesity with BMI of 45.0-49.9, adult (HCC) 06/28/2018   • Chronic thyroiditis 02/12/2013   • thyroid nodules    •  Vitamin D deficiency 02/16/2012   • Sleep apnea    • Hypertension 02/22/2010   • Hypothyroidism 02/22/2010   • Goiter       Allergies:Nitrofurantoin, Aspirin, Codeine, Fish, Maxepa, and Peanut oil    Current Outpatient Medications   Medication Sig Dispense Refill   • liothyronine (CYTOMEL) 5 MCG Tab Take 1 Tab by mouth every day. 30 Tab 2   • levothyroxine (SYNTHROID) 112 MCG Tab Take 1 Tab by mouth Every morning on an empty stomach. 30 Tab 2   • Coenzyme Q10 (CO Q 10 PO) Take  by mouth.     • Fexofenadine HCl (ALLEGRA ALLERGY PO) Take  by mouth.     • Fluticasone Propionate (FLONASE NA) Spray  in nose.     • ELDERBERRY PO Take  by mouth.     • vitamin D, Ergocalciferol, (DRISDOL) 1.25 MG (36009 UT) Cap capsule Take  by mouth every 7 days.     • Flaxseed, Linseed, (FLAX SEED OIL PO) Take  by mouth.     • Probiotic Product (PROBIOTIC-10 PO) Take  by mouth.     • EPINEPHrine (EPIPEN) 0.3 MG/0.3ML Solution Auto-injector solution for injection INJECT INTO THIGH UTD     • triamterene-hctz (MAXZIDE-25/DYAZIDE) 37.5-25 MG Tab every day. Taking 1/2 a tab  0     No current facility-administered medications for this visit.        Social History     Tobacco Use   • Smoking status: Never Smoker   • Smokeless tobacco: Never Used   Substance Use Topics   • Alcohol use: Yes     Frequency: Monthly or less     Drinks per session: 1 or 2     Binge frequency: Never   • Drug use: No     Social History     Social History Narrative   • Not on file       Family History   Problem Relation Age of Onset   • Thyroid Mother    • Cancer Mother         breast   • Heart Attack Father    • Sleep Apnea Father    • Arterial Aneurysm Father    • Sleep Apnea Brother    • Cancer Maternal Aunt         breast   • No Known Problems Sister    • Diabetes Maternal Grandmother    • No Known Problems Son    • Thyroid Daughter        Review of Systems:    Constitutional: No Fevers, Chills  Eyes: No vision changes  ENT: No hearing changes  Resp: No Shortness of  "breath  CV: No Chest pain  GI: No Nausea/Vomiting  MSK: No weakness  Skin: No rashes  Neuro: No Headaches  Psych: No Suicidal ideations    All remaining systems reviewed and found to be negative, except as stated above.    Exam:    /74   Pulse 89   Temp 36.9 °C (98.4 °F) (Temporal)   Resp 14   Ht 1.676 m (5' 6\")   Wt (!) 127 kg (280 lb)   SpO2 95%  Body mass index is 45.19 kg/m².    General:  Well nourished, well developed female in NAD  HENT: Atraumatic, normocephalic  EYES: Extraocular movements intact  NECK: Supple, FROM  CHEST: No deformities, Equal chest expansion  RESP: Unlabored, no stridor or audible wheeze  HEART: Regular Rate and rhythm.   Extremities: No Clubbing, Cyanosis, or Edema  Skin: Warm/dry, without rashes  Neuro: A/O x 4, due to COVID-19- did not have patient remove face mask to test cranial nerves.  Motor/sensory grossly intact  Psych: Normal behavior, normal affect      Lab review:  Labs are reviewed and discussed with a patient  Lab Results   Component Value Date/Time    CHOLSTRLTOT 168 06/05/2020 08:05 AM    LDL 82 06/05/2020 08:05 AM    HDL 76 06/05/2020 08:05 AM    TRIGLYCERIDE 48 06/05/2020 08:05 AM       Lab Results   Component Value Date/Time    SODIUM 129 (L) 06/05/2020 08:05 AM    POTASSIUM 4.1 06/05/2020 08:05 AM    CHLORIDE 91 (L) 06/05/2020 08:05 AM    CO2 27 06/05/2020 08:05 AM    GLUCOSE 90 06/05/2020 08:05 AM    BUN 9 06/05/2020 08:05 AM    CREATININE 0.41 (L) 06/05/2020 08:05 AM    CREATININE 0.59 02/08/2013 08:05 AM    BUNCREATRAT 17 02/08/2013 08:05 AM    GLOMRATE >59 03/03/2010 09:30 AM     Lab Results   Component Value Date/Time    ALKPHOSPHAT 96 06/05/2020 08:05 AM    ASTSGOT 19 06/05/2020 08:05 AM    ALTSGPT 28 06/05/2020 08:05 AM    TBILIRUBIN 0.6 06/05/2020 08:05 AM      No results found for: HBA1C  Lab Results   Component Value Date/Time    TSH 2.140 09/06/2013 08:37 AM    TSH 1.700 02/08/2013 08:05 AM     Lab Results   Component Value Date/Time    FREET4 " 1.73 (H) 06/05/2020 08:05 AM       Lab Results   Component Value Date/Time    WBC 5.7 06/05/2020 08:05 AM    WBC 6.7 08/15/2012 11:05 AM    RBC 4.91 06/05/2020 08:05 AM    RBC 4.99 08/15/2012 11:05 AM    HEMOGLOBIN 15.1 06/05/2020 08:05 AM    HEMATOCRIT 45.0 06/05/2020 08:05 AM    MCV 91.6 06/05/2020 08:05 AM    MCV 90 08/15/2012 11:05 AM    MCH 30.8 06/05/2020 08:05 AM    MCH 30.1 08/15/2012 11:05 AM    MCHC 33.6 06/05/2020 08:05 AM    MPV 10.3 06/05/2020 08:05 AM    NEUTSPOLYS 61.20 06/05/2020 08:05 AM    LYMPHOCYTES 28.40 06/05/2020 08:05 AM    MONOCYTES 8.70 06/05/2020 08:05 AM    EOSINOPHILS 0.90 06/05/2020 08:05 AM    BASOPHILS 0.50 06/05/2020 08:05 AM        Medical Records Reviewed:  Reviewed consult note from Dr. Lopez from August 5, 2020.  He is agreeable to trial of Cytomel 5 mcg and reducing Synthroid to 112 mcg because TSH is at the lower end of normal.  T3 is at lower end of normal as well.  Patient is agreeable to try Cytomel as well.    Assessment/Plan:  1. Hypothyroidism due to Hashimoto's thyroiditis  - TSH; Future  - T3 FREE; Future  - FREE THYROXINE; Future  Trial of Synthroid brand name 112 mcg daily with Cytomel 5 mcg daily.  Patient instructed to call immediately if she has issues reducing her Synthroid dose in the past she has had mood issues.  Recheck labs in 4 weeks.  2. Morbid obesity with BMI of 45.0-49.9, adult ([facility])  Encouraged patient to continue with her lifestyle modifications.  I think it is a solid plan to reduce her caloric intake to 1200 shaylee a day, in addition to reducing her protein intake.  I am again hopeful that adding the Cytomel will help her ability to lose weight.  Other orders  - liothyronine (CYTOMEL) 5 MCG Tab; Take 1 Tab by mouth every day.  Dispense: 30 Tab; Refill: 2  - levothyroxine (SYNTHROID) 88 MCG Tab; Take 1 Tab by mouth Every morning on an empty stomach.  Dispense: 30 Tab; Refill: 2       Follow-up: Return in about 4 weeks (around 9/30/2020) for Follow up  on labs.    Please note that this dictation was created using voice recognition software. I have made every reasonable attempt to correct obvious errors, but I expect that there are errors of grammar and possibly content that I did not discover before finalizing the note.

## 2020-09-02 NOTE — ASSESSMENT & PLAN NOTE
Patient continue to follow-up with Dr. Bhatia.  She had to reduce her protein intake, she was advised to do 100 g a day but this was too much and she was actually gaining weight.  Patient has reduced her protein to about 70 g a day.  She is trying to keep her overall caloric intake at about  1200 shaylee.  She has found that since reducing the protein she is started to reduce weight again.  Patient does have a genetic history of obesity as well.  We discussed again today adding Cytomel to her regimen to see if increasing her active thyroid hormone will help her ability to lose weight.  Again patient is very diligent with her diet, and she works out regularly.

## 2020-09-02 NOTE — ASSESSMENT & PLAN NOTE
Dr. Mooney consult note from August 2020 reviewed.  Patient and Dr. Lopez were agreeable to trial of Cytomel 5 mcg.  Because her TSH is at the lower end of normal he did recommend that we reduce her Synthroid to 112 mcg to accommodate the Cytomel.  Patient's T3 is at the lower end of normal.  She complains of difficulty growing her nails, difficulty losing weight and heat and cold intolerance.  I am hopeful that the Cytomel would improve her symptoms.  In the past she has had difficulty with lowering her Synthroid dose.  We will have to watch this carefully.  She has had mood changes in the past.

## 2020-09-09 ENCOUNTER — OFFICE VISIT (OUTPATIENT)
Dept: HEALTH INFORMATION MANAGEMENT | Facility: MEDICAL CENTER | Age: 65
End: 2020-09-09
Payer: MEDICARE

## 2020-09-09 VITALS
HEIGHT: 66 IN | DIASTOLIC BLOOD PRESSURE: 82 MMHG | WEIGHT: 284.5 LBS | BODY MASS INDEX: 45.72 KG/M2 | OXYGEN SATURATION: 95 % | HEART RATE: 78 BPM | SYSTOLIC BLOOD PRESSURE: 126 MMHG

## 2020-09-09 DIAGNOSIS — E06.3 HYPOTHYROIDISM DUE TO HASHIMOTO'S THYROIDITIS: ICD-10-CM

## 2020-09-09 DIAGNOSIS — R63.2 BINGE EATING: ICD-10-CM

## 2020-09-09 DIAGNOSIS — E03.8 HYPOTHYROIDISM DUE TO HASHIMOTO'S THYROIDITIS: ICD-10-CM

## 2020-09-09 DIAGNOSIS — G47.33 OBSTRUCTIVE SLEEP APNEA SYNDROME: ICD-10-CM

## 2020-09-09 DIAGNOSIS — E66.01 MORBID OBESITY WITH BMI OF 45.0-49.9, ADULT (HCC): ICD-10-CM

## 2020-09-09 DIAGNOSIS — Z71.3 DIETARY COUNSELING AND SURVEILLANCE: ICD-10-CM

## 2020-09-09 DIAGNOSIS — I10 ESSENTIAL HYPERTENSION: ICD-10-CM

## 2020-09-09 PROCEDURE — 99213 OFFICE O/P EST LOW 20 MIN: CPT | Mod: 25 | Performed by: INTERNAL MEDICINE

## 2020-09-09 PROCEDURE — G0447 BEHAVIOR COUNSEL OBESITY 15M: HCPCS | Performed by: INTERNAL MEDICINE

## 2020-09-09 ASSESSMENT — FIBROSIS 4 INDEX: FIB4 SCORE: 0.98

## 2020-09-09 ASSESSMENT — PATIENT HEALTH QUESTIONNAIRE - PHQ9: CLINICAL INTERPRETATION OF PHQ2 SCORE: 0

## 2020-09-09 NOTE — PROGRESS NOTES
Bariatric Medicine Follow Up  Chief Complaint   Patient presents with   • Weight Gain       History of Present Illness:   Adelaide Pereira is a 65 y.o. female who presents for follow-up to intensive behavioral modification of overweight and to help address co-morbidities caused by overweight, as below.    Obesity Stage/Class: 2/3  During the patient's last visit, the following were discussed and recommended:  Weight Goal: 5% wt loss at one month after start (pt goal weight is 170 lb)  Dietary intervention:    MR: 1 ND q. OD  High Protein/Low Carb whole food meals and 2 snacks between meals daily  >100 g protein, <100 g total carbs daily, 1400 kcal/d   Track daily intake with written journal, bring to next visit  64+ oz water per day  Physical Activity:  Goal is 45 min daily of brisk walking or yardwork minimum  Labs:  Fasting insulin  Rx changes:   Pt wants to use herbs  If no change in wt after two wk start metformin for possible medication related wt gain  Consider also GLP1 agonist, phentermine/topiramate  Metamucil daily or orlistat 60 mg tid prn constipation  Behavior modification:   Increase activity  Surgical considerations: Consider referral to bariatric surgery pending course with medical weight loss      Challenges:  Getting enough protein in   Dietary adherence:  Good  Had been overeating protein now reduced  Continues with WW  Continues 1 ND MR QOD  Keeps written journal, around 1200 kcal/d  Exercise:  Gym classes 2/wk, walking 40 min daily, gardening    AntiObesity Medication use: OTC herbs per pt request  Medication efficacy/tolerance/side effects: n/a  Medication compliance: n/a    Status of comorbid conditions/other medical diagnoses:  RAISA: Sleep stable  HTN: Controlled on triamterene/hydrochlorothiazide  Hypothyroid: Controlled, seeing endocrinology regularly, continue Cytomel 5, levothyroxine 112 mcg       Review of Systems   Pt denies lightheadedness, weakness, worsening fatigue, excessive dry mouth,  "mood changes, paresthesias.  All other ROS were reviewed and are otherwise unchanged from my previous visit with patient.    Physical Exam:    /82 (BP Location: Left arm, Patient Position: Sitting, BP Cuff Size: Large adult)   Pulse 78   Ht 1.676 m (5' 6\")   Wt (!) 129 kg (284 lb 8 oz)   SpO2 95%   BMI 45.92 kg/m²   Waist Measurement   Waist: 49 inch/inches  Weight change since last visit: -4 lb (-6.7 lb total)  Waist Circum change since last visit: -2 in (-4 in total)    Constitutional: Oriented to person, place, and time and well-developed, well-nourished, and in no distress.    Head: Normocephalic.   Musculoskeletal: Normal range of motion. No edema.   Neurological: Alert and oriented to person, place, and time. No muscle weakness.  Gait normal.   Skin: Warm and dry. Not diaphoretic.   Psychiatric: Mood, memory, affect and judgment normal.     Laboratory:   Recent labs reviewed.      Dietitian Assessment: N/A    ASSESSMENT  65 y.o.  female presents for intensive lifestyle intervention for treatment of obesity and co-morbid conditions.  Obesity Stage ([place]) 2; Class 3    1. Binge eating     2. Obstructive sleep apnea syndrome     3. Essential hypertension     4. Morbid obesity with BMI of 45.0-49.9, adult (HCC)     5. Hypothyroidism due to Hashimoto's thyroiditis         The patient feels great.  Her binge eating is under much better control, without anti-obesity medication.  She has taken to heart tracking her intake, reducing refined carbohydrates and increasing her protein intake to a better balance of macronutrients.  Sleep, blood pressure, thyroid status stable.  Encouraged her to increase her activity, as she is doing.    PLAN  Weight Goal: 5% wt loss at one month after start (pt goal weight is 170 lb)  Dietary intervention:    MR: 1 ND every other morning  High Protein/Low Carb whole food meals and 2 snacks between meals daily  100 g protein, <100 g total carbs daily, 1200 kcal/d   If she " feels full, does not need to eat 100 g of protein  Track daily intake with written journal, bring to next visit  64+ oz water per day  Physical Activity: Swimming, walking, gym classes 2/week  Labs: N/A  Rx changes:   None new  Behavior modification:   Continue increasing activity, tracking intake  Surgical considerations: N/A  Follow-up: one month with MD    Patient's body mass index is 45.92 kg/m². Exercise and nutrition counseling were performed at this visit.        >>>>>>>>>>>>>      PREVENTIVE SERVICES COUNSELING FOR OBESITY NOTE    O:  Body mass index is 45.92 kg/m²., see also vitals flowsheet for today  Pt struggling with:  Stress management  Cravings  Meal planning  A:  Dietary counseling and surveillance for obesity  Z71.3, E66.9, BMI Code: Z68.42  P:  Counseled pt on reduced kcal, reduced refined CHO whole food meal plan  Advised exercise: Walking, cardio at gym  Discussed strategies of self-monitoring with:   food diary  cognitive restructuring  stress reduction  stimulus control  Meal pre-planning  Environmental management  Time: 15 min

## 2020-09-10 ENCOUNTER — PATIENT MESSAGE (OUTPATIENT)
Dept: SLEEP MEDICINE | Facility: MEDICAL CENTER | Age: 65
End: 2020-09-10

## 2020-09-10 DIAGNOSIS — G47.33 OSA (OBSTRUCTIVE SLEEP APNEA): ICD-10-CM

## 2020-09-21 ENCOUNTER — HOSPITAL ENCOUNTER (OUTPATIENT)
Dept: RADIOLOGY | Facility: MEDICAL CENTER | Age: 65
End: 2020-09-21
Attending: PHYSICIAN ASSISTANT
Payer: MEDICARE

## 2020-09-21 DIAGNOSIS — Z12.31 VISIT FOR SCREENING MAMMOGRAM: ICD-10-CM

## 2020-09-21 PROCEDURE — 77067 SCR MAMMO BI INCL CAD: CPT

## 2020-09-22 ENCOUNTER — TELEPHONE (OUTPATIENT)
Dept: MEDICAL GROUP | Facility: PHYSICIAN GROUP | Age: 65
End: 2020-09-22

## 2020-09-22 NOTE — TELEPHONE ENCOUNTER
----- Message from Shey Pastrana P.A.-C. sent at 9/22/2020 11:37 AM PDT -----  Please call patient about their results.     Results showed: Left breast asymmetry, recommending further evaluation.  I will call in a diagnostic mammogram and ultrasound if indicated.    If you have any questions or concerns, do not hesitate to contact me or my Medical Assistant. Thank you for your time today.     Respectfully,     Shey Pastrana PA-C

## 2020-09-22 NOTE — PROGRESS NOTES
Mammogram showed left breast asymmetry, recommending diagnostic mammogram and ultrasound if indicated.

## 2020-09-25 ENCOUNTER — HOSPITAL ENCOUNTER (OUTPATIENT)
Dept: RADIOLOGY | Facility: MEDICAL CENTER | Age: 65
End: 2020-09-25
Attending: PHYSICIAN ASSISTANT
Payer: MEDICARE

## 2020-09-25 DIAGNOSIS — R92.8 ABNORMAL MAMMOGRAM: ICD-10-CM

## 2020-09-25 PROCEDURE — G0279 TOMOSYNTHESIS, MAMMO: HCPCS | Mod: LT

## 2020-09-28 ENCOUNTER — TELEPHONE (OUTPATIENT)
Dept: MEDICAL GROUP | Facility: PHYSICIAN GROUP | Age: 65
End: 2020-09-28

## 2020-09-28 NOTE — TELEPHONE ENCOUNTER
----- Message from Shey Pastrana P.A.-C. sent at 9/25/2020  2:36 PM PDT -----  Please call patient about their results.     Results showed: normal, recommend routine mammogram screening.    Thank you,    Shey Pastrana PA-C

## 2020-10-16 ENCOUNTER — HOSPITAL ENCOUNTER (OUTPATIENT)
Dept: LAB | Facility: MEDICAL CENTER | Age: 65
End: 2020-10-16
Attending: PHYSICIAN ASSISTANT
Payer: MEDICARE

## 2020-10-16 DIAGNOSIS — E03.8 HYPOTHYROIDISM DUE TO HASHIMOTO'S THYROIDITIS: ICD-10-CM

## 2020-10-16 DIAGNOSIS — E06.3 HYPOTHYROIDISM DUE TO HASHIMOTO'S THYROIDITIS: ICD-10-CM

## 2020-10-16 PROCEDURE — 84481 FREE ASSAY (FT-3): CPT

## 2020-10-16 PROCEDURE — 36415 COLL VENOUS BLD VENIPUNCTURE: CPT

## 2020-10-16 PROCEDURE — 84443 ASSAY THYROID STIM HORMONE: CPT

## 2020-10-16 PROCEDURE — 84439 ASSAY OF FREE THYROXINE: CPT

## 2020-10-17 LAB
T3FREE SERPL-MCNC: 1.83 PG/ML (ref 2–4.4)
T4 FREE SERPL-MCNC: 1.4 NG/DL (ref 0.93–1.7)
TSH SERPL DL<=0.005 MIU/L-ACNC: 0.7 UIU/ML (ref 0.38–5.33)

## 2020-10-19 ENCOUNTER — TELEPHONE (OUTPATIENT)
Dept: MEDICAL GROUP | Facility: PHYSICIAN GROUP | Age: 65
End: 2020-10-19

## 2020-10-19 NOTE — TELEPHONE ENCOUNTER
----- Message from Shey Pastrana P.A.-C. sent at 10/19/2020 12:58 PM PDT -----  Please call patient about their results.     Results showed: Maximiliano Bryson, your lab results came back.  Your TSH looks reasonable at 0.705.  Your T3 is slightly low at 1.83.  This would reinforce continuing the trial of Cytomel if you are tolerating it.  Is going to leave your medications the same until her next follow-up visit to discuss how your symptoms are doing.    If you have any questions or concerns, do not hesitate to contact me or my Medical Assistant. Thank you for your time today.     Respectfully,     Shey Pastrana PA-C

## 2020-10-20 ENCOUNTER — OFFICE VISIT (OUTPATIENT)
Dept: HEALTH INFORMATION MANAGEMENT | Facility: MEDICAL CENTER | Age: 65
End: 2020-10-20
Payer: MEDICARE

## 2020-10-20 VITALS
WEIGHT: 279.85 LBS | BODY MASS INDEX: 44.98 KG/M2 | OXYGEN SATURATION: 91 % | HEART RATE: 85 BPM | DIASTOLIC BLOOD PRESSURE: 82 MMHG | SYSTOLIC BLOOD PRESSURE: 128 MMHG | HEIGHT: 66 IN

## 2020-10-20 DIAGNOSIS — I10 ESSENTIAL HYPERTENSION: ICD-10-CM

## 2020-10-20 DIAGNOSIS — Z71.3 DIETARY COUNSELING AND SURVEILLANCE: ICD-10-CM

## 2020-10-20 DIAGNOSIS — G47.33 OBSTRUCTIVE SLEEP APNEA SYNDROME: ICD-10-CM

## 2020-10-20 DIAGNOSIS — E55.9 VITAMIN D DEFICIENCY: ICD-10-CM

## 2020-10-20 DIAGNOSIS — E66.01 MORBID OBESITY WITH BMI OF 45.0-49.9, ADULT (HCC): ICD-10-CM

## 2020-10-20 DIAGNOSIS — R63.2 BINGE EATING: ICD-10-CM

## 2020-10-20 PROCEDURE — 99213 OFFICE O/P EST LOW 20 MIN: CPT | Mod: 25 | Performed by: INTERNAL MEDICINE

## 2020-10-20 PROCEDURE — G0447 BEHAVIOR COUNSEL OBESITY 15M: HCPCS | Performed by: INTERNAL MEDICINE

## 2020-10-20 ASSESSMENT — PATIENT HEALTH QUESTIONNAIRE - PHQ9: CLINICAL INTERPRETATION OF PHQ2 SCORE: 0

## 2020-10-20 ASSESSMENT — FIBROSIS 4 INDEX: FIB4 SCORE: 0.98

## 2020-10-20 NOTE — PROGRESS NOTES
"Bariatric Medicine Follow Up  Chief Complaint   Patient presents with   • Weight Gain       History of Present Illness:   Adelaide Pereira is a 65 y.o. female who presents for follow-up to intensive behavioral modification of overweight and to help address co-morbidities caused by overweight, as below.    Obesity Stage/Class: 2/3  During the patient's last visit, the following were discussed and recommended:  Weight Goal: 5% wt loss at one month after start (pt goal weight is 170 lb)  Dietary intervention:    MR: 1 ND every other morning  High Protein/Low Carb whole food meals and 2 snacks between meals daily  100 g protein, <100 g total carbs daily, 1200 kcal/d   If she feels full, does not need to eat 100 g of protein  Track daily intake with written journal, bring to next visit  64+ oz water per day  Physical Activity: Swimming, walking, gym classes 2/week  Labs: N/A  Rx changes:   None new  Behavior modification:   Continue increasing activity, tracking intake    ___    Challenges:  Added cytomel, some brain fog  Dietary adherence:  Good  \" I have a good plan now, know what to do\"  Not drinking as much water d/t low sodium on last labs  Wants to have ND MR for dinner, had not been having ND qam  Still in WW weekly, going down in points, keeping food journal  No longer binge eating, in remission  Exercise:  Walking more, less joint pain    AntiObesity Medication use: None  Medication efficacy/tolerance/side effects: N/A  Medication compliance: N/A    Status of comorbid conditions/other medical diagnoses:  HTN:  Controlled  RAISA:  Positional, may not change with wt loss  VDD:  Controlled   Hyponatremia: recheck via PCP next month       Review of Systems   Pt denies lightheadedness, weakness, worsening fatigue, excessive dry mouth, mood changes, paresthesias.  All other ROS were reviewed and are otherwise unchanged from my previous visit with patient.    Physical Exam:    /82 (BP Location: Left arm, Patient " "Position: Sitting, BP Cuff Size: Large adult)   Pulse 85   Ht 1.676 m (5' 6\")   Wt (!) 126.9 kg (279 lb 13.6 oz)   SpO2 91%   BMI 45.17 kg/m²   Waist Measurement   Waist: 47.5 inch/inches  Weight change since last visit: -5 lb (-11.4 lb total)  Waist Circum change since last visit: -1.5 in (-5.5 in total)    Constitutional: Oriented to person, place, and time and well-developed, well-nourished, and in no distress.    Head: Normocephalic.   Musculoskeletal: Normal range of motion. No edema.   Neurological: Alert and oriented to person, place, and time. No muscle weakness.  Gait normal.   Skin: Warm and dry. Not diaphoretic.   Psychiatric: Mood, memory, affect and judgment normal.     Laboratory:   Recent labs reviewed.      Dietitian Assessment: N/A    ASSESSMENT  65 y.o.  female presents for intensive lifestyle intervention for treatment of obesity and co-morbid conditions.  Obesity Stage ([place])/Class: 2/3    1. Binge eating     2. Essential hypertension     3. Obstructive sleep apnea syndrome     4. Vitamin D deficiency     5. Morbid obesity with BMI of 45.0-49.9, adult ([facility])         The patient is doing well.  Binge eating now in remission.  Blood pressure well controlled, sleep stable.  Continue current vitamin D dosing.  Update labs via PCP.  Continue refined CHO reduction, tracking via weight watchers nimesh and written journal, stimulus narrowing.  Gradually increase activity level.    PLAN  Weight Goal: 170 lb  BMI less than 40 for Ortho surgery as planned  Dietary intervention:    MR: 1 ND for dinner daily  High Protein/Low Carb whole food meals and 2 snacks between meals daily  Tracking via weight watchers nimesh, going down 9 points  Also keeping written food journal per her request  64+ oz water per day  Avoid excess water due to hyponatremia  Physical Activity: Walking daily with her friends  Labs: [facility] 12/2020 via PCP per patient request  Rx changes:   None  Behavior modification:   As above  Surgical " considerations: Pending Ortho surgery after weight loss  Follow-up: one month with MD    Patient's body mass index is 45.17 kg/m². Exercise and nutrition counseling were performed at this visit.        >>>>>>>>>>>>>      PREVENTIVE SERVICES COUNSELING FOR OBESITY NOTE    O:  Body mass index is 45.17 kg/m²., see also vitals flowsheet for today  Pt struggling with:  Stress management  Cravings  Keeping food diary/tracking consumption  Meal planning  Meal prep  Taking medications as prescribed  A:  Dietary counseling and surveillance for obesity  Z71.3, E66.01, BMI Code: Z68.42  P:  Counseled pt on reduced kcal, reduced refined CHO whole food meal plan  Advised exercise: Walking  Discussed strategies of self-monitoring with:   food diary  cognitive restructuring  stress reduction  stimulus control  Meal pre-planning  Environmental management  Time: 15 min

## 2020-11-02 ENCOUNTER — APPOINTMENT (RX ONLY)
Dept: URBAN - METROPOLITAN AREA CLINIC 22 | Facility: CLINIC | Age: 65
Setting detail: DERMATOLOGY
End: 2020-11-02

## 2020-11-02 DIAGNOSIS — D22 MELANOCYTIC NEVI: ICD-10-CM

## 2020-11-02 DIAGNOSIS — L81.4 OTHER MELANIN HYPERPIGMENTATION: ICD-10-CM

## 2020-11-02 DIAGNOSIS — L82.0 INFLAMED SEBORRHEIC KERATOSIS: ICD-10-CM

## 2020-11-02 DIAGNOSIS — L57.0 ACTINIC KERATOSIS: ICD-10-CM

## 2020-11-02 DIAGNOSIS — Z71.89 OTHER SPECIFIED COUNSELING: ICD-10-CM

## 2020-11-02 DIAGNOSIS — D69.2 OTHER NONTHROMBOCYTOPENIC PURPURA: ICD-10-CM

## 2020-11-02 DIAGNOSIS — D18.0 HEMANGIOMA: ICD-10-CM

## 2020-11-02 DIAGNOSIS — L82.1 OTHER SEBORRHEIC KERATOSIS: ICD-10-CM

## 2020-11-02 PROBLEM — D22.5 MELANOCYTIC NEVI OF TRUNK: Status: ACTIVE | Noted: 2020-11-02

## 2020-11-02 PROBLEM — D18.01 HEMANGIOMA OF SKIN AND SUBCUTANEOUS TISSUE: Status: ACTIVE | Noted: 2020-11-02

## 2020-11-02 PROCEDURE — ? LIQUID NITROGEN

## 2020-11-02 PROCEDURE — 17110 DESTRUCTION B9 LES UP TO 14: CPT

## 2020-11-02 PROCEDURE — ? LIQUID NITROGEN (COSMETIC)

## 2020-11-02 PROCEDURE — 17000 DESTRUCT PREMALG LESION: CPT | Mod: 59

## 2020-11-02 PROCEDURE — 99214 OFFICE O/P EST MOD 30 MIN: CPT | Mod: 25

## 2020-11-02 PROCEDURE — ? COUNSELING

## 2020-11-02 ASSESSMENT — LOCATION DETAILED DESCRIPTION DERM
LOCATION DETAILED: SUPERIOR THORACIC SPINE
LOCATION DETAILED: LEFT LATERAL DISTAL PRETIBIAL REGION
LOCATION DETAILED: RIGHT INFERIOR MEDIAL MALAR CHEEK
LOCATION DETAILED: LOWER STERNUM
LOCATION DETAILED: RIGHT PROXIMAL DORSAL FOREARM
LOCATION DETAILED: INFERIOR THORACIC SPINE
LOCATION DETAILED: LEFT MEDIAL PROXIMAL PRETIBIAL REGION
LOCATION DETAILED: GLABELLA
LOCATION DETAILED: INFERIOR MID FOREHEAD
LOCATION DETAILED: LEFT VENTRAL PROXIMAL FOREARM
LOCATION DETAILED: LEFT PROXIMAL DORSAL FOREARM
LOCATION DETAILED: RIGHT VENTRAL PROXIMAL FOREARM

## 2020-11-02 ASSESSMENT — LOCATION SIMPLE DESCRIPTION DERM
LOCATION SIMPLE: RIGHT FOREARM
LOCATION SIMPLE: INFERIOR FOREHEAD
LOCATION SIMPLE: GLABELLA
LOCATION SIMPLE: CHEST
LOCATION SIMPLE: LEFT PRETIBIAL REGION
LOCATION SIMPLE: RIGHT CHEEK
LOCATION SIMPLE: LEFT FOREARM
LOCATION SIMPLE: UPPER BACK

## 2020-11-02 ASSESSMENT — LOCATION ZONE DERM
LOCATION ZONE: TRUNK
LOCATION ZONE: FACE
LOCATION ZONE: LEG
LOCATION ZONE: ARM

## 2020-11-05 ENCOUNTER — TELEPHONE (OUTPATIENT)
Dept: MEDICAL GROUP | Facility: PHYSICIAN GROUP | Age: 65
End: 2020-11-05

## 2020-11-09 ENCOUNTER — OFFICE VISIT (OUTPATIENT)
Dept: MEDICAL GROUP | Facility: PHYSICIAN GROUP | Age: 65
End: 2020-11-09
Payer: MEDICARE

## 2020-11-09 VITALS
RESPIRATION RATE: 12 BRPM | BODY MASS INDEX: 44.68 KG/M2 | HEART RATE: 75 BPM | WEIGHT: 278 LBS | DIASTOLIC BLOOD PRESSURE: 82 MMHG | TEMPERATURE: 97 F | HEIGHT: 66 IN | OXYGEN SATURATION: 97 % | SYSTOLIC BLOOD PRESSURE: 138 MMHG

## 2020-11-09 DIAGNOSIS — E06.3 HYPOTHYROIDISM DUE TO HASHIMOTO'S THYROIDITIS: ICD-10-CM

## 2020-11-09 DIAGNOSIS — E03.8 HYPOTHYROIDISM DUE TO HASHIMOTO'S THYROIDITIS: ICD-10-CM

## 2020-11-09 DIAGNOSIS — E87.1 HYPONATREMIA: ICD-10-CM

## 2020-11-09 PROCEDURE — 99213 OFFICE O/P EST LOW 20 MIN: CPT | Performed by: PHYSICIAN ASSISTANT

## 2020-11-09 RX ORDER — THYROID 60 MG/1
TABLET ORAL
Qty: 40 TAB | Refills: 2 | Status: SHIPPED | OUTPATIENT
Start: 2020-11-09 | End: 2020-12-22 | Stop reason: SDUPTHER

## 2020-11-09 ASSESSMENT — FIBROSIS 4 INDEX: FIB4 SCORE: 0.98

## 2020-11-09 NOTE — ASSESSMENT & PLAN NOTE
This is a  chronic condition.   Last TSH level:   TSH   Date Value Ref Range Status   10/16/2020 0.705 0.380 - 5.330 uIU/mL Final     Comment:     Please note new reference ranges effective 12/14/2017 10:00 AM  Pregnant Females, 1st Trimester  0.050-3.700  Pregnant Females, 2nd Trimester  0.310-4.350  Pregnant Females, 3rd Trimester  0.410-5.180       Current Medication: Levothyroxine 112 mcg daily and Cytomel 5 mcg daily.  Side effects to medication:       Previous TSH 0.695, we trialed adding Cytomel to see if it would increase her free T3.  Previous T4 elevated at 1.73, current T4 within normal limits at 1.40.  Previous T3 normal at 65.4, but this was not a free T3.  Current free T3 low at 1.83.    Her weight is going down, and her nails are growing out. But is causing arm pain, memory fog. She would like to discontinue the cytomel now. She had been on [place] and past and tolerated it well.     She's unsure why her and Dr. Lopez stopped it in the past- maybe a body odor??     On the 112 mcg does give her a diminished mood. Will see if we need to bump up the medication.     She stopped the cytomel 2 days ago and her arm pain is resolved, feeling much better.

## 2020-11-09 NOTE — PROGRESS NOTES
CC:   Chief Complaint   Patient presents with   • Lab Results   • Hypothyroidism          HISTORY OF PRESENT ILLNESS: Patient is a 65 y.o. female established patient who presents today to follow up hypothyroidism and labs.     Health Maintenance: Completed  Reviewed mammogram 09/20.     Hypothyroidism  This is a  chronic condition.   Last TSH level:   TSH   Date Value Ref Range Status   10/16/2020 0.705 0.380 - 5.330 uIU/mL Final     Comment:     Please note new reference ranges effective 12/14/2017 10:00 AM  Pregnant Females, 1st Trimester  0.050-3.700  Pregnant Females, 2nd Trimester  0.310-4.350  Pregnant Females, 3rd Trimester  0.410-5.180       Current Medication: Levothyroxine 112 mcg daily and Cytomel 5 mcg daily.  Side effects to medication:       Previous TSH 0.695, we trialed adding Cytomel to see if it would increase her free T3.  Previous T4 elevated at 1.73, current T4 within normal limits at 1.40.  Previous T3 normal at 65.4, but this was not a free T3.  Current free T3 low at 1.83.    Her weight is going down, and her nails are growing out. But is causing arm pain, memory fog. She would like to discontinue the cytomel now. She had been on [place] and past and tolerated it well.     She's unsure why her and Dr. Lopez stopped it in the past- maybe a body odor??     On the 112 mcg does give her a diminished mood. Will see if we need to bump up the medication.     She stopped the cytomel 2 days ago and her arm pain is resolved, feeling much better.     Hyponatremia  Needs recheck.  Per Dr. Lopez's previous note was drinking too much water.      Patient Active Problem List    Diagnosis Date Noted   • Food allergy 07/08/2020   • Multiple thyroid nodules 07/08/2020   • Hyponatremia 07/07/2020   • Binge eating 06/22/2020   • Non-smoker 06/10/2019   • Morbid obesity with BMI of 45.0-49.9, adult (HCC) 06/28/2018   • Chronic thyroiditis 02/12/2013   • Vitamin D deficiency 02/16/2012   • Sleep apnea    •  Hypertension 02/22/2010   • Hypothyroidism 02/22/2010   • Goiter       Allergies:Nitrofurantoin, Aspirin, Codeine, Fish, Maxepa, and Peanut oil    Current Outpatient Medications   Medication Sig Dispense Refill   • thyroid (ARMOUR THYROID) 60 MG Tab Take 1 1/4 tabs daily, first thing in the morning with water only for 60 minutes. 40 Tab 2   • Coenzyme Q10 (CO Q 10 PO) Take  by mouth.     • Fexofenadine HCl (ALLEGRA ALLERGY PO) Take  by mouth.     • Fluticasone Propionate (FLONASE NA) Spray  in nose.     • ELDERBERRY PO Take  by mouth.     • vitamin D, Ergocalciferol, (DRISDOL) 1.25 MG (53059 UT) Cap capsule Take  by mouth every 7 days.     • Flaxseed, Linseed, (FLAX SEED OIL PO) Take  by mouth.     • Probiotic Product (PROBIOTIC-10 PO) Take  by mouth.     • EPINEPHrine (EPIPEN) 0.3 MG/0.3ML Solution Auto-injector solution for injection INJECT INTO THIGH UTD     • triamterene-hctz (MAXZIDE-25/DYAZIDE) 37.5-25 MG Tab every day. Taking 1/2 a tab  0     No current facility-administered medications for this visit.        Social History     Tobacco Use   • Smoking status: Never Smoker   • Smokeless tobacco: Never Used   Substance Use Topics   • Alcohol use: Yes     Frequency: Monthly or less     Drinks per session: 1 or 2     Binge frequency: Never   • Drug use: No     Social History     Social History Narrative   • Not on file       Family History   Problem Relation Age of Onset   • Thyroid Mother    • Cancer Mother         breast   • Heart Attack Father    • Sleep Apnea Father    • Arterial Aneurysm Father    • Sleep Apnea Brother    • Cancer Maternal Aunt         breast   • No Known Problems Sister    • Diabetes Maternal Grandmother    • No Known Problems Son    • Thyroid Daughter        Review of Systems:    Constitutional: No Fevers, Chills  Eyes: No vision changes  ENT: No hearing changes  Resp: No Shortness of breath  CV: No Chest pain  GI: No Nausea/Vomiting  MSK: No weakness  Skin: No rashes  Neuro: No  "Headaches  Psych: No Suicidal ideations    All remaining systems reviewed and found to be negative, except as stated above.    Exam:    /82   Pulse 75   Temp 36.1 °C (97 °F) (Temporal)   Resp 12   Ht 1.676 m (5' 6\")   Wt (!) 126.1 kg (278 lb)   SpO2 97%  Body mass index is 44.87 kg/m².    General:  Well nourished, well developed female in NAD  HENT: Atraumatic, normocephalic  EYES: Extraocular movements intact  NECK: Supple, FROM  CHEST: No deformities, Equal chest expansion  RESP: Unlabored, no stridor or audible wheeze  HEART: Regular Rate and rhythm.   Extremities: No Clubbing, Cyanosis, or Edema  Skin: Warm/dry, without rashes  Neuro: A/O x 4, due to COVID-19- did not have patient remove face mask to test cranial nerves.  Motor/sensory grossly intact  Psych: Normal behavior, normal affect      Lab review:  Labs are reviewed and discussed with a patient  Lab Results   Component Value Date/Time    CHOLSTRLTOT 168 06/05/2020 08:05 AM    LDL 82 06/05/2020 08:05 AM    HDL 76 06/05/2020 08:05 AM    TRIGLYCERIDE 48 06/05/2020 08:05 AM       Lab Results   Component Value Date/Time    SODIUM 129 (L) 06/05/2020 08:05 AM    POTASSIUM 4.1 06/05/2020 08:05 AM    CHLORIDE 91 (L) 06/05/2020 08:05 AM    CO2 27 06/05/2020 08:05 AM    GLUCOSE 90 06/05/2020 08:05 AM    BUN 9 06/05/2020 08:05 AM    CREATININE 0.41 (L) 06/05/2020 08:05 AM    CREATININE 0.59 02/08/2013 08:05 AM    BUNCREATRAT 17 02/08/2013 08:05 AM    GLOMRATE >59 03/03/2010 09:30 AM     Lab Results   Component Value Date/Time    ALKPHOSPHAT 96 06/05/2020 08:05 AM    ASTSGOT 19 06/05/2020 08:05 AM    ALTSGPT 28 06/05/2020 08:05 AM    TBILIRUBIN 0.6 06/05/2020 08:05 AM      No results found for: HBA1C  Lab Results   Component Value Date/Time    TSH 2.140 09/06/2013 08:37 AM    TSH 1.700 02/08/2013 08:05 AM     Lab Results   Component Value Date/Time    FREET4 1.40 10/16/2020 08:14 AM    FREET4 1.73 (H) 06/05/2020 08:05 AM       Lab Results   Component " Value Date/Time    WBC 5.7 06/05/2020 08:05 AM    WBC 6.7 08/15/2012 11:05 AM    RBC 4.91 06/05/2020 08:05 AM    RBC 4.99 08/15/2012 11:05 AM    HEMOGLOBIN 15.1 06/05/2020 08:05 AM    HEMATOCRIT 45.0 06/05/2020 08:05 AM    MCV 91.6 06/05/2020 08:05 AM    MCV 90 08/15/2012 11:05 AM    MCH 30.8 06/05/2020 08:05 AM    MCH 30.1 08/15/2012 11:05 AM    MCHC 33.6 06/05/2020 08:05 AM    MPV 10.3 06/05/2020 08:05 AM    NEUTSPOLYS 61.20 06/05/2020 08:05 AM    LYMPHOCYTES 28.40 06/05/2020 08:05 AM    MONOCYTES 8.70 06/05/2020 08:05 AM    EOSINOPHILS 0.90 06/05/2020 08:05 AM    BASOPHILS 0.50 06/05/2020 08:05 AM          Assessment/Plan:  1. Hypothyroidism due to Hashimoto's thyroiditis  Patient did not tolerate the Cytomel, had side effects.  We will stop now.  She has been on [place] Thyroid in the past and to her recollection tolerated without issue.  Going to convert her levothyroxine to [place].  We did start at 1-1/4 grains a day.  I do suspect that she is not converting her T4 into active T3 efficiently.  Her free T3 is low.  She did have improvement as far as weight loss and nail growth with the Cytomel added, but again had to many significant side effects to continue the medication.  Hopeful that desiccated thyroid hormone will give her active T3 but will not cause side effects.  Repeat labs in 6 weeks.    2. Hyponatremia  Patient will cut back on her water intake especially right before labs.  Repeat labs before next visit.    Other orders  - Basic Metabolic Panel; Future       Follow-up: Return in about 6 weeks (around 12/21/2020) for Follow up on labs.    Please note that this dictation was created using voice recognition software. I have made every reasonable attempt to correct obvious errors, but I expect that there are errors of grammar and possibly content that I did not discover before finalizing the note.

## 2020-11-17 ENCOUNTER — HOSPITAL ENCOUNTER (OUTPATIENT)
Dept: LAB | Facility: MEDICAL CENTER | Age: 65
End: 2020-11-17
Attending: PHYSICIAN ASSISTANT
Payer: MEDICARE

## 2020-11-17 DIAGNOSIS — E06.3 HYPOTHYROIDISM DUE TO HASHIMOTO'S THYROIDITIS: ICD-10-CM

## 2020-11-17 DIAGNOSIS — E03.8 HYPOTHYROIDISM DUE TO HASHIMOTO'S THYROIDITIS: ICD-10-CM

## 2020-11-17 DIAGNOSIS — E87.1 HYPONATREMIA: ICD-10-CM

## 2020-11-17 LAB
ANION GAP SERPL CALC-SCNC: 10 MMOL/L (ref 7–16)
BUN SERPL-MCNC: 17 MG/DL (ref 8–22)
CALCIUM SERPL-MCNC: 9.2 MG/DL (ref 8.5–10.5)
CHLORIDE SERPL-SCNC: 98 MMOL/L (ref 96–112)
CO2 SERPL-SCNC: 26 MMOL/L (ref 20–33)
CREAT SERPL-MCNC: 0.52 MG/DL (ref 0.5–1.4)
GLUCOSE SERPL-MCNC: 88 MG/DL (ref 65–99)
POTASSIUM SERPL-SCNC: 4.3 MMOL/L (ref 3.6–5.5)
SODIUM SERPL-SCNC: 134 MMOL/L (ref 135–145)
T3FREE SERPL-MCNC: 2.82 PG/ML (ref 2–4.4)
T4 FREE SERPL-MCNC: 1.1 NG/DL (ref 0.93–1.7)

## 2020-11-17 PROCEDURE — 36415 COLL VENOUS BLD VENIPUNCTURE: CPT

## 2020-11-17 PROCEDURE — 84481 FREE ASSAY (FT-3): CPT

## 2020-11-17 PROCEDURE — 84439 ASSAY OF FREE THYROXINE: CPT

## 2020-11-17 PROCEDURE — 84443 ASSAY THYROID STIM HORMONE: CPT

## 2020-11-17 PROCEDURE — 80048 BASIC METABOLIC PNL TOTAL CA: CPT

## 2020-11-18 LAB — TSH SERPL DL<=0.005 MIU/L-ACNC: 0.47 UIU/ML (ref 0.38–5.33)

## 2020-11-19 ENCOUNTER — TELEPHONE (OUTPATIENT)
Dept: MEDICAL GROUP | Facility: PHYSICIAN GROUP | Age: 65
End: 2020-11-19

## 2020-11-19 NOTE — TELEPHONE ENCOUNTER
----- Message from Shey Pastrana P.A.-C. sent at 11/19/2020  8:37 AM PST -----  Please call patient about their results.     Results showed: Good news, your T3 did look a lot better, hopefully that means we are still on the right track with a trial of desiccated thyroid hormone. TSH looks ok at 0.474. Free T4 is normal as well.     Your sodium and chloride is improved. We will continue to watch this. The rest of your labs are normal.      Hope you have a great week.     Thank you,    Shey Pastrana PA-C

## 2020-12-22 ENCOUNTER — OFFICE VISIT (OUTPATIENT)
Dept: MEDICAL GROUP | Facility: PHYSICIAN GROUP | Age: 65
End: 2020-12-22
Payer: MEDICARE

## 2020-12-22 VITALS
DIASTOLIC BLOOD PRESSURE: 82 MMHG | RESPIRATION RATE: 18 BRPM | OXYGEN SATURATION: 96 % | SYSTOLIC BLOOD PRESSURE: 130 MMHG | TEMPERATURE: 99.1 F | BODY MASS INDEX: 45 KG/M2 | WEIGHT: 280 LBS | HEIGHT: 66 IN | HEART RATE: 82 BPM

## 2020-12-22 DIAGNOSIS — E06.3 HYPOTHYROIDISM DUE TO HASHIMOTO'S THYROIDITIS: ICD-10-CM

## 2020-12-22 DIAGNOSIS — E03.8 HYPOTHYROIDISM DUE TO HASHIMOTO'S THYROIDITIS: ICD-10-CM

## 2020-12-22 DIAGNOSIS — E87.1 HYPONATREMIA: ICD-10-CM

## 2020-12-22 DIAGNOSIS — Z20.822 CLOSE EXPOSURE TO COVID-19 VIRUS: ICD-10-CM

## 2020-12-22 PROCEDURE — 99213 OFFICE O/P EST LOW 20 MIN: CPT | Mod: CS | Performed by: PHYSICIAN ASSISTANT

## 2020-12-22 RX ORDER — THYROID 60 MG/1
TABLET ORAL
Qty: 40 TAB | Refills: 2 | Status: SHIPPED | OUTPATIENT
Start: 2020-12-22 | End: 2021-03-30

## 2020-12-22 ASSESSMENT — FIBROSIS 4 INDEX: FIB4 SCORE: 0.98

## 2020-12-22 NOTE — ASSESSMENT & PLAN NOTE
TSH from October 16, 2020 was at 0.705.  TSH on November 17, 2020 was at 0.474.  Free T3 went from 1.83, now at 2.82.  At her last appointment we took her off of her Cytomel and Synthroid and changed her to [place] Thyroid 60 mg-one quarter tabs daily.    Patient states that she is feeling very good since her last visit.  She really likes to change her medication.  She is having more energy.  No side effects of medication change.

## 2020-12-22 NOTE — PROGRESS NOTES
CC:   Chief Complaint   Patient presents with   • Hypothyroidism          HISTORY OF PRESENT ILLNESS: Patient is a 65 y.o. female established patient who presents today to follow up on the following issues:    Health Maintenance: Completed    Hypothyroidism  TSH from October 16, 2020 was at 0.705.  TSH on November 17, 2020 was at 0.474.  Free T3 went from 1.83, now at 2.82.  At her last appointment we took her off of her Cytomel and Synthroid and changed her to [place] Thyroid 60 mg-one quarter tabs daily.    Patient states that she is feeling very good since her last visit.  She really likes to change her medication.  She is having more energy.  No side effects of medication change.    Hyponatremia  Lab Results   Component Value Date/Time    SODIUM 134 (L) 11/17/2020 11:48 AM    SODIUM 129 (L) 06/05/2020 08:05 AM    SODIUM 136 06/22/2019 08:06 AM   ]  Sodium improved from previous labs.  We will continue to monitor.      Patient Active Problem List    Diagnosis Date Noted   • Food allergy 07/08/2020   • Multiple thyroid nodules 07/08/2020   • Hyponatremia 07/07/2020   • Binge eating 06/22/2020   • Non-smoker 06/10/2019   • Morbid obesity with BMI of 45.0-49.9, adult ([facility]) 06/28/2018   • Chronic thyroiditis 02/12/2013   • Vitamin D deficiency 02/16/2012   • Sleep apnea    • Hypertension 02/22/2010   • Hypothyroidism 02/22/2010   • Goiter       Allergies:Nitrofurantoin, Aspirin, Codeine, Fish, Maxepa, and Peanut oil    Current Outpatient Medications   Medication Sig Dispense Refill   • thyroid (ARMOUR THYROID) 60 MG Tab Take 1 1/4 tabs daily, first thing in the morning with water only for 60 minutes. 40 Tab 2   • nystatin/triamcinolone (MYCOLOG) 121239-8.1 UNIT/GM-% Cream Apply 1 gram to affected area once a day. 30 g 0   • Coenzyme Q10 (CO Q 10 PO) Take  by mouth.     • Fexofenadine HCl (ALLEGRA ALLERGY PO) Take  by mouth.     • Fluticasone Propionate (FLONASE NA) Spray  in nose.     • ELDERBERRY PO Take  by mouth.    "  • vitamin D, Ergocalciferol, (DRISDOL) 1.25 MG (78678 UT) Cap capsule Take  by mouth every 7 days.     • Flaxseed, Linseed, (FLAX SEED OIL PO) Take  by mouth.     • Probiotic Product (PROBIOTIC-10 PO) Take  by mouth.     • EPINEPHrine (EPIPEN) 0.3 MG/0.3ML Solution Auto-injector solution for injection INJECT INTO THIGH UTD     • triamterene-hctz (MAXZIDE-25/DYAZIDE) 37.5-25 MG Tab every day. Taking 1/2 a tab  0     No current facility-administered medications for this visit.        Social History     Tobacco Use   • Smoking status: Never Smoker   • Smokeless tobacco: Never Used   Substance Use Topics   • Alcohol use: Yes     Frequency: Monthly or less     Drinks per session: 1 or 2     Binge frequency: Never   • Drug use: No     Social History     Social History Narrative   • Not on file       Family History   Problem Relation Age of Onset   • Thyroid Mother    • Cancer Mother         breast   • Heart Attack Father    • Sleep Apnea Father    • Arterial Aneurysm Father    • Sleep Apnea Brother    • Cancer Maternal Aunt         breast   • No Known Problems Sister    • Diabetes Maternal Grandmother    • No Known Problems Son    • Thyroid Daughter        Review of Systems:    Constitutional: No Fevers, Chills  Eyes: No vision changes  ENT: No hearing changes  Resp: No Shortness of breath  CV: No Chest pain  GI: No Nausea/Vomiting  MSK: No weakness  Skin: No rashes  Neuro: No Headaches  Psych: No Suicidal ideations    All remaining systems reviewed and found to be negative, except as stated above.    Exam:    /82   Pulse 82   Temp 37.3 °C (99.1 °F)   Resp 18   Ht 1.676 m (5' 6\")   Wt (!) 127 kg (280 lb)   SpO2 96%  Body mass index is 45.19 kg/m².    General:  Well nourished, well developed female in NAD  HENT: Atraumatic, normocephalic  EYES: Extraocular movements intact  NECK: Supple, FROM  CHEST: No deformities, Equal chest expansion  RESP: Unlabored, no stridor or audible wheeze  HEART: Regular Rate and " rhythm.   Extremities: No Clubbing, Cyanosis, or Edema  Skin: Warm/dry, without rashes  Neuro: A/O x 4, due to COVID-19- did not have patient remove face mask to test cranial nerves.  Motor/sensory grossly intact  Psych: Normal behavior, normal affect      Lab review:  Labs are reviewed and discussed with a patient  Lab Results   Component Value Date/Time    CHOLSTRLTOT 168 06/05/2020 08:05 AM    LDL 82 06/05/2020 08:05 AM    HDL 76 06/05/2020 08:05 AM    TRIGLYCERIDE 48 06/05/2020 08:05 AM       Lab Results   Component Value Date/Time    SODIUM 134 (L) 11/17/2020 11:48 AM    POTASSIUM 4.3 11/17/2020 11:48 AM    CHLORIDE 98 11/17/2020 11:48 AM    CO2 26 11/17/2020 11:48 AM    GLUCOSE 88 11/17/2020 11:48 AM    BUN 17 11/17/2020 11:48 AM    CREATININE 0.52 11/17/2020 11:48 AM    CREATININE 0.59 02/08/2013 08:05 AM    BUNCREATRAT 17 02/08/2013 08:05 AM    GLOMRATE >59 03/03/2010 09:30 AM     Lab Results   Component Value Date/Time    ALKPHOSPHAT 96 06/05/2020 08:05 AM    ASTSGOT 19 06/05/2020 08:05 AM    ALTSGPT 28 06/05/2020 08:05 AM    TBILIRUBIN 0.6 06/05/2020 08:05 AM      No results found for: HBA1C  Lab Results   Component Value Date/Time    TSH 2.140 09/06/2013 08:37 AM    TSH 1.700 02/08/2013 08:05 AM     Lab Results   Component Value Date/Time    FREET4 1.10 11/17/2020 11:48 AM    FREET4 1.40 10/16/2020 08:14 AM       Lab Results   Component Value Date/Time    WBC 5.7 06/05/2020 08:05 AM    WBC 6.7 08/15/2012 11:05 AM    RBC 4.91 06/05/2020 08:05 AM    RBC 4.99 08/15/2012 11:05 AM    HEMOGLOBIN 15.1 06/05/2020 08:05 AM    HEMATOCRIT 45.0 06/05/2020 08:05 AM    MCV 91.6 06/05/2020 08:05 AM    MCV 90 08/15/2012 11:05 AM    MCH 30.8 06/05/2020 08:05 AM    MCH 30.1 08/15/2012 11:05 AM    MCHC 33.6 06/05/2020 08:05 AM    MPV 10.3 06/05/2020 08:05 AM    NEUTSPOLYS 61.20 06/05/2020 08:05 AM    LYMPHOCYTES 28.40 06/05/2020 08:05 AM    MONOCYTES 8.70 06/05/2020 08:05 AM    EOSINOPHILS 0.90 06/05/2020 08:05 AM     BASOPHILS 0.50 06/05/2020 08:05 AM          Assessment/Plan:  1. Hypothyroidism due to Hashimoto's thyroiditis  - TSH; Future  - T3 FREE; Future  - FREE THYROXINE; Future  Patient feeling dramatically improved.  Great news.  TSH and free T3 acceptable.  T3 improved from last labs.  We will continue the [place] Thyroid 60 mg one quarter tabs daily.  Repeat labs in 3 months.  2. Hyponatremia  - Basic Metabolic Panel; Future  - URINE SODIUM RANDOM; Future  Sodium improved, chronic history of hyponatremia.  We will get a urine sodium as a further analysis.  Could be secondary to her diuretic, patient does not want to come off or decrease dosage at this time.  Blood pressure very well controlled.  3. Close exposure to COVID-19 virus  - SARS CoV-2 Ab, Total; Future    Other orders  - thyroid (ARMOUR THYROID) 60 MG Tab; Take 1 1/4 tabs daily, first thing in the morning with water only for 60 minutes.  Dispense: 40 Tab; Refill: 2  - nystatin/triamcinolone (MYCOLOG) 686491-9.1 UNIT/GM-% Cream; Apply 1 gram to affected area once a day.  Dispense: 30 g; Refill: 0       Follow-up: Return in about 3 months (around 3/22/2021) for Follow up on labs.    Please note that this dictation was created using voice recognition software. I have made every reasonable attempt to correct obvious errors, but I expect that there are errors of grammar and possibly content that I did not discover before finalizing the note.

## 2020-12-22 NOTE — ASSESSMENT & PLAN NOTE
Lab Results   Component Value Date/Time    SODIUM 134 (L) 11/17/2020 11:48 AM    SODIUM 129 (L) 06/05/2020 08:05 AM    SODIUM 136 06/22/2019 08:06 AM   ]  Sodium improved from previous labs.  We will continue to monitor.

## 2021-02-22 ENCOUNTER — OFFICE VISIT (OUTPATIENT)
Dept: HEALTH INFORMATION MANAGEMENT | Facility: MEDICAL CENTER | Age: 66
End: 2021-02-22
Payer: MEDICARE

## 2021-02-22 VITALS
SYSTOLIC BLOOD PRESSURE: 128 MMHG | WEIGHT: 272.9 LBS | HEART RATE: 79 BPM | HEIGHT: 66 IN | DIASTOLIC BLOOD PRESSURE: 80 MMHG | BODY MASS INDEX: 43.86 KG/M2 | OXYGEN SATURATION: 94 %

## 2021-02-22 DIAGNOSIS — E66.01 MORBID OBESITY WITH BMI OF 45.0-49.9, ADULT (HCC): ICD-10-CM

## 2021-02-22 DIAGNOSIS — E55.9 VITAMIN D DEFICIENCY: ICD-10-CM

## 2021-02-22 DIAGNOSIS — I10 ESSENTIAL HYPERTENSION: ICD-10-CM

## 2021-02-22 DIAGNOSIS — R63.2 BINGE EATING: ICD-10-CM

## 2021-02-22 DIAGNOSIS — G47.33 OBSTRUCTIVE SLEEP APNEA SYNDROME: ICD-10-CM

## 2021-02-22 PROCEDURE — 99214 OFFICE O/P EST MOD 30 MIN: CPT | Performed by: INTERNAL MEDICINE

## 2021-02-22 ASSESSMENT — FIBROSIS 4 INDEX: FIB4 SCORE: 0.98

## 2021-02-22 ASSESSMENT — PATIENT HEALTH QUESTIONNAIRE - PHQ9: CLINICAL INTERPRETATION OF PHQ2 SCORE: 0

## 2021-02-22 NOTE — PROGRESS NOTES
"Bariatric Medicine Follow Up  Chief Complaint   Patient presents with   • Weight Gain   • Obesity       History of Present Illness:   Adelaide Pereira is a 65 y.o. female who presents for follow-up to intensive behavioral modification of overweight and to help address below co-morbidities caused by overweight.      Weight Management History to Date:  1 ND for dinner daily  Using weight watchers nimesh for tracking and written journal  Preparing for orthopedic surgery, with BMI less than 40 required  ___    Challenges since last visit:  Did not tolerate cytomel  Felt lousy for almost two months, weak  Had Covid scare, did not develop it but was in quarantine for 2 weeks  Dietary adherence: Overall better  She doing well with 1200 kcal/d  Less binge eating  Really watching refined carbohydrate intake more  Exercise:  10k steps/d    AntiObesity Medication use: None  Medication efficacy/tolerance/side effects: N/A  Medication compliance: N/A    Status of comorbid conditions/other medical diagnoses:  Hypothyroid:  Much more stable on current [place] thyroid  VDD: Controlled with weekly high-dose vitamin D supplement  RAISA: Controlled  HTN: Well-controlled on triamterene/hydrochlorothiazide       Review of Systems   Pt denies lightheadedness, weakness, worsening fatigue, excessive dry mouth, mood changes, paresthesias.  All other ROS were reviewed and are otherwise unchanged from my previous visit with patient.    Physical Exam:    /80 (BP Location: Left arm, Patient Position: Sitting, BP Cuff Size: Large adult)   Pulse 79   Ht 1.676 m (5' 6\")   Wt 124 kg (272 lb 14.4 oz)   SpO2 94%   BMI 44.05 kg/m²   Waist Measurement   Waist: 47 inch/inches  Weight change since last visit: -7 lb (- 18.3 lb total)  Waist Circum change since last visit: - 0.5 in (-6 in total)    Constitutional: Oriented to person, place, and time and well-developed, well-nourished, and in no distress.    Head: Normocephalic.   Musculoskeletal: Normal " range of motion. No edema.   Neurological: Alert and oriented to person, place, and time. No muscle weakness.  Gait normal.   Skin: Warm and dry. Not diaphoretic.   Psychiatric: Mood, memory, affect and judgment normal.     Laboratory:   Recent labs reviewed.      Dietitian Assessment: N/A    ASSESSMENT  65 y.o.  female presents for intensive lifestyle intervention for treatment of obesity and co-morbid conditions.  Obesity Stage ([place])/Class: 2/3    1. Morbid obesity with BMI of 45.0-49.9, adult ([facility])     2. Binge eating     3. Vitamin D deficiency     4. Obstructive sleep apnea syndrome     5. Essential hypertension         The patient is doing well.  Binge eating under good control.  Patient declines antiobesity medication.  Doing well with tracking of intake, reducing total kcal and refined CHO intake as recommended.  Monitor vitamin D, sleep, blood pressure, currently controlled.    PLAN  Weight Goal: 200 lb  BMI 40 for orthopedic surgery  Dietary intervention:    MR: 1 ND for dinner daily  High Protein/Low Carb whole food meals and 2 snacks between meals daily  >100 g protein, <100 g total carbs daily, 1200 kcal/d   Track daily intake with My Fitness Pal, bring to next visit  64+ oz water per day  Physical Activity:  Gym 2/wk with [employer]  10k steps/d is goal  Labs:  n/a  Rx changes:   Considering Ozempic  Behavior modification:   Maintain consistency with above, including tracking of intake  Surgical considerations: Pending Ortho surgery with BMI less than 40  Follow-up: one month with MD    Patient's body mass index is 44.05 kg/m². Exercise and nutrition counseling were performed at this visit.

## 2021-03-03 DIAGNOSIS — Z23 NEED FOR VACCINATION: ICD-10-CM

## 2021-03-03 RX ORDER — TRIAMTERENE AND HYDROCHLOROTHIAZIDE 37.5; 25 MG/1; MG/1
TABLET ORAL
Qty: 45 TABLET | Refills: 1 | Status: SHIPPED
Start: 2021-03-03 | End: 2021-03-23

## 2021-03-03 NOTE — TELEPHONE ENCOUNTER
----- Message from Adelaide Pereira sent at 3/3/2021  7:14 AM PST -----  Regarding: Prescription Question  Contact: [phone]  Maximiliano Kat, I assume when my records came over from Dr. Peña that it listed that I am on Triamterene  37.5 mg.  I take 1/2  a day. I will see you on 3/23. I will run out of my current  prescription for Triamterere in one week. Would you please call in another prescription please to [facility].  Thank you, Adelaide Pereira

## 2021-03-19 ENCOUNTER — HOSPITAL ENCOUNTER (OUTPATIENT)
Dept: LAB | Facility: MEDICAL CENTER | Age: 66
End: 2021-03-19
Attending: PHYSICIAN ASSISTANT
Payer: MEDICARE

## 2021-03-19 DIAGNOSIS — E06.3 HYPOTHYROIDISM DUE TO HASHIMOTO'S THYROIDITIS: ICD-10-CM

## 2021-03-19 DIAGNOSIS — E03.8 HYPOTHYROIDISM DUE TO HASHIMOTO'S THYROIDITIS: ICD-10-CM

## 2021-03-19 DIAGNOSIS — Z20.822 CLOSE EXPOSURE TO COVID-19 VIRUS: ICD-10-CM

## 2021-03-19 DIAGNOSIS — E87.1 HYPONATREMIA: ICD-10-CM

## 2021-03-19 LAB
ANION GAP SERPL CALC-SCNC: 10 MMOL/L (ref 7–16)
BUN SERPL-MCNC: 12 MG/DL (ref 8–22)
CALCIUM SERPL-MCNC: 9.6 MG/DL (ref 8.5–10.5)
CHLORIDE SERPL-SCNC: 94 MMOL/L (ref 96–112)
CO2 SERPL-SCNC: 25 MMOL/L (ref 20–33)
CREAT SERPL-MCNC: 0.41 MG/DL (ref 0.5–1.4)
FASTING STATUS PATIENT QL REPORTED: NORMAL
GLUCOSE SERPL-MCNC: 90 MG/DL (ref 65–99)
POTASSIUM SERPL-SCNC: 4.4 MMOL/L (ref 3.6–5.5)
SODIUM SERPL-SCNC: 129 MMOL/L (ref 135–145)
SODIUM UR-SCNC: 20 MMOL/L

## 2021-03-19 PROCEDURE — 80048 BASIC METABOLIC PNL TOTAL CA: CPT

## 2021-03-19 PROCEDURE — 86769 SARS-COV-2 COVID-19 ANTIBODY: CPT

## 2021-03-19 PROCEDURE — 84439 ASSAY OF FREE THYROXINE: CPT

## 2021-03-19 PROCEDURE — 84443 ASSAY THYROID STIM HORMONE: CPT

## 2021-03-19 PROCEDURE — 84481 FREE ASSAY (FT-3): CPT

## 2021-03-19 PROCEDURE — 84300 ASSAY OF URINE SODIUM: CPT

## 2021-03-19 PROCEDURE — 36415 COLL VENOUS BLD VENIPUNCTURE: CPT

## 2021-03-20 LAB
T3FREE SERPL-MCNC: 2.28 PG/ML (ref 2–4.4)
T4 FREE SERPL-MCNC: 1.01 NG/DL (ref 0.93–1.7)
TSH SERPL DL<=0.005 MIU/L-ACNC: 1.33 UIU/ML (ref 0.38–5.33)

## 2021-03-22 ENCOUNTER — TELEPHONE (OUTPATIENT)
Dept: MEDICAL GROUP | Facility: PHYSICIAN GROUP | Age: 66
End: 2021-03-22

## 2021-03-22 LAB — SARS-COV-2 IGG SERPLBLD QL IA.RAPID: POSITIVE

## 2021-03-22 NOTE — TELEPHONE ENCOUNTER
----- Message from Shey Pastrana P.A.-C. sent at 3/22/2021 12:07 PM PDT -----  Please call patient about their results.     Results showed: Covid antibody test did come back positive.    We will discussed these results in full detail at your next follow-up visit on March 23, 2021.    Thank you,    Shey Pastrana PA-C

## 2021-03-23 ENCOUNTER — OFFICE VISIT (OUTPATIENT)
Dept: MEDICAL GROUP | Facility: PHYSICIAN GROUP | Age: 66
End: 2021-03-23
Payer: MEDICARE

## 2021-03-23 VITALS
OXYGEN SATURATION: 94 % | HEIGHT: 66 IN | HEART RATE: 84 BPM | RESPIRATION RATE: 12 BRPM | BODY MASS INDEX: 43.39 KG/M2 | WEIGHT: 270 LBS | TEMPERATURE: 97.5 F | DIASTOLIC BLOOD PRESSURE: 82 MMHG | SYSTOLIC BLOOD PRESSURE: 120 MMHG

## 2021-03-23 DIAGNOSIS — E66.01 MORBID OBESITY WITH BMI OF 45.0-49.9, ADULT (HCC): ICD-10-CM

## 2021-03-23 DIAGNOSIS — E87.1 HYPONATREMIA: ICD-10-CM

## 2021-03-23 DIAGNOSIS — Z79.899 HIGH RISK MEDICATION USE: ICD-10-CM

## 2021-03-23 DIAGNOSIS — I10 ESSENTIAL HYPERTENSION: ICD-10-CM

## 2021-03-23 DIAGNOSIS — E04.2 MULTIPLE THYROID NODULES: ICD-10-CM

## 2021-03-23 DIAGNOSIS — E03.8 HYPOTHYROIDISM DUE TO HASHIMOTO'S THYROIDITIS: ICD-10-CM

## 2021-03-23 DIAGNOSIS — E06.3 HYPOTHYROIDISM DUE TO HASHIMOTO'S THYROIDITIS: ICD-10-CM

## 2021-03-23 PROCEDURE — 99214 OFFICE O/P EST MOD 30 MIN: CPT | Performed by: PHYSICIAN ASSISTANT

## 2021-03-23 RX ORDER — LOSARTAN POTASSIUM 25 MG/1
25 TABLET ORAL DAILY
Qty: 30 TABLET | Refills: 2 | Status: SHIPPED | OUTPATIENT
Start: 2021-03-23 | End: 2021-03-25 | Stop reason: SDUPTHER

## 2021-03-23 RX ORDER — EPINEPHRINE 0.3 MG/.3ML
INJECTION SUBCUTANEOUS
Qty: 1 EACH | Refills: 0 | Status: SHIPPED | OUTPATIENT
Start: 2021-03-23 | End: 2022-10-20 | Stop reason: SDUPTHER

## 2021-03-23 ASSESSMENT — FIBROSIS 4 INDEX: FIB4 SCORE: 0.98

## 2021-03-23 NOTE — ASSESSMENT & PLAN NOTE
This is a chronic condition.  Continues to work with Health Improvement Program.   Has lost 10 pounds since last visit.

## 2021-03-23 NOTE — ASSESSMENT & PLAN NOTE
This is a  chronic condition.   Last TSH level:   TSH   Date Value Ref Range Status   03/19/2021 1.330 0.380 - 5.330 uIU/mL Final     Comment:     Please note new reference ranges effective 12/14/2017 10:00 AM  Pregnant Females, 1st Trimester  0.050-3.700  Pregnant Females, 2nd Trimester  0.310-4.350  Pregnant Females, 3rd Trimester  0.410-5.180       Current Medication: armour thyroid 60 mg daily  Side effects to medication: none  Denies any chronic fatigue, constipation, dry skin, weight gain, heat/cold intolerance, or hair loss.

## 2021-03-23 NOTE — PROGRESS NOTES
CC:   Chief Complaint   Patient presents with   • Lab Results   • Hypothyroidism          HISTORY OF PRESENT ILLNESS: Patient is a 65 y.o. female established patient who presents today to follow up on the following conditions:     Health Maintenance: Completed    Morbid obesity with BMI of 45.0-49.9, adult ([facility])  This is a chronic condition.  Continues to work with Health Improvement Program.   Has lost 10 pounds since last visit.     Hyponatremia  Lab Results   Component Value Date/Time    SODIUM 129 (L) 03/19/2021 09:03 AM    POTASSIUM 4.4 03/19/2021 09:03 AM    CHLORIDE 94 (L) 03/19/2021 09:03 AM    CO2 25 03/19/2021 09:03 AM    ANION 10.0 03/19/2021 09:03 AM    GLUCOSE 90 03/19/2021 09:03 AM    BUN 12 03/19/2021 09:03 AM    CREATININE 0.41 (L) 03/19/2021 09:03 AM    CREATININE 0.59 02/08/2013 08:05 AM    CALCIUM 9.6 03/19/2021 09:03 AM    ASTSGOT 19 06/05/2020 08:05 AM    ALTSGPT 28 06/05/2020 08:05 AM    TBILIRUBIN 0.6 06/05/2020 08:05 AM    ALBUMIN 4.3 06/05/2020 08:05 AM    TOTPROTEIN 7.1 06/05/2020 08:05 AM    GLOBULIN 2.8 06/05/2020 08:05 AM    AGRATIO 1.5 06/05/2020 08:05 AM   ]  Suspect her hydrochlorothiazide could be contributing factor to her repeat hyponatremia.  Urine sodium level was within normal limits.    Hypothyroidism  This is a  chronic condition.   Last TSH level:   TSH   Date Value Ref Range Status   03/19/2021 1.330 0.380 - 5.330 uIU/mL Final     Comment:     Please note new reference ranges effective 12/14/2017 10:00 AM  Pregnant Females, 1st Trimester  0.050-3.700  Pregnant Females, 2nd Trimester  0.310-4.350  Pregnant Females, 3rd Trimester  0.410-5.180       Current Medication: armour thyroid 60 mg daily  Side effects to medication: none  Denies any chronic fatigue, constipation, dry skin, weight gain, heat/cold intolerance, or hair loss.       Hypertension  Discussed going off triamterene/HCTZ and trial alternative medication. Discussed trial losartan instead.     Multiple thyroid  nodules  Patient history of thyroid nodules. Due repeat labs 06/21- then if stable. Repeat 2 years then 5 years.       Patient Active Problem List    Diagnosis Date Noted   • Food allergy 07/08/2020   • Multiple thyroid nodules 07/08/2020   • Hyponatremia 07/07/2020   • Binge eating 06/22/2020   • Non-smoker 06/10/2019   • Morbid obesity with BMI of 45.0-49.9, adult (HCC) 06/28/2018   • Chronic thyroiditis 02/12/2013   • Vitamin D deficiency 02/16/2012   • Sleep apnea    • Hypertension 02/22/2010   • Hypothyroidism 02/22/2010   • Goiter       Allergies:Nitrofurantoin, Aspirin, Codeine, Fish, Maxepa, and Peanut oil    Current Outpatient Medications   Medication Sig Dispense Refill   • losartan (COZAAR) 25 MG Tab Take 1 tablet by mouth every day. 30 tablet 2   • EPINEPHrine (EPIPEN) 0.3 MG/0.3ML Solution Auto-injector solution for injection INJECT INTO THIGH UTD 1 Each 0   • thyroid (ARMOUR THYROID) 60 MG Tab Take 1 1/4 tabs daily, first thing in the morning with water only for 60 minutes. 40 Tab 2   • Coenzyme Q10 (CO Q 10 PO) Take  by mouth.     • Fexofenadine HCl (ALLEGRA ALLERGY PO) Take  by mouth.     • Fluticasone Propionate (FLONASE NA) Spray  in nose.     • ELDERBERRY PO Take  by mouth.     • vitamin D, Ergocalciferol, (DRISDOL) 1.25 MG (96170 UT) Cap capsule Take  by mouth every 7 days.     • Flaxseed, Linseed, (FLAX SEED OIL PO) Take  by mouth.     • Probiotic Product (PROBIOTIC-10 PO) Take  by mouth.       No current facility-administered medications for this visit.       Social History     Tobacco Use   • Smoking status: Never Smoker   • Smokeless tobacco: Never Used   Substance Use Topics   • Alcohol use: Yes   • Drug use: No     Social History     Social History Narrative   • Not on file       Family History   Problem Relation Age of Onset   • Thyroid Mother    • Cancer Mother         breast   • Heart Attack Father    • Sleep Apnea Father    • Arterial Aneurysm Father    • Sleep Apnea Brother    • Cancer  "Maternal Aunt         breast   • No Known Problems Sister    • Diabetes Maternal Grandmother    • No Known Problems Son    • Thyroid Daughter        Review of Systems:    Constitutional: No Fevers, Chills  Eyes: No vision changes  ENT: No hearing changes  Resp: No Shortness of breath  CV: No Chest pain  GI: No Nausea/Vomiting  MSK: No weakness  Skin: No rashes  Neuro: No Headaches  Psych: No Suicidal ideations    All remaining systems reviewed and found to be negative, except as stated above.    Exam:    /82   Pulse 84   Temp 36.4 °C (97.5 °F) (Temporal)   Resp 12   Ht 1.676 m (5' 6\")   Wt 122 kg (270 lb)   SpO2 94%  Body mass index is 43.58 kg/m².    General:  Well nourished, well developed female in NAD  HENT: Atraumatic, normocephalic  EYES: Extraocular movements intact  NECK: Supple, FROM  CHEST: No deformities, Equal chest expansion  RESP: Unlabored, no stridor or audible wheeze  HEART: Regular Rate and rhythm.   Extremities: No Clubbing, Cyanosis, or Edema  Skin: Warm/dry, without rashes  Neuro: A/O x 4, due to COVID-19- did not have patient remove face mask to test cranial nerves.  Motor/sensory grossly intact  Psych: Normal behavior, normal affect      Lab review:  Labs are reviewed and discussed with a patient  Lab Results   Component Value Date/Time    CHOLSTRLTOT 168 06/05/2020 08:05 AM    LDL 82 06/05/2020 08:05 AM    HDL 76 06/05/2020 08:05 AM    TRIGLYCERIDE 48 06/05/2020 08:05 AM       Lab Results   Component Value Date/Time    SODIUM 129 (L) 03/19/2021 09:03 AM    POTASSIUM 4.4 03/19/2021 09:03 AM    CHLORIDE 94 (L) 03/19/2021 09:03 AM    CO2 25 03/19/2021 09:03 AM    GLUCOSE 90 03/19/2021 09:03 AM    BUN 12 03/19/2021 09:03 AM    CREATININE 0.41 (L) 03/19/2021 09:03 AM    CREATININE 0.59 02/08/2013 08:05 AM    BUNCREATRAT 17 02/08/2013 08:05 AM    GLOMRATE >59 03/03/2010 09:30 AM     Lab Results   Component Value Date/Time    ALKPHOSPHAT 96 06/05/2020 08:05 AM    ASTSGOT 19 06/05/2020 " 08:05 AM    ALTSGPT 28 06/05/2020 08:05 AM    TBILIRUBIN 0.6 06/05/2020 08:05 AM      No results found for: HBA1C  Lab Results   Component Value Date/Time    TSH 2.140 09/06/2013 08:37 AM    TSH 1.700 02/08/2013 08:05 AM     Lab Results   Component Value Date/Time    FREET4 1.01 03/19/2021 09:03 AM    FREET4 1.10 11/17/2020 11:48 AM       Lab Results   Component Value Date/Time    WBC 5.7 06/05/2020 08:05 AM    WBC 6.7 08/15/2012 11:05 AM    RBC 4.91 06/05/2020 08:05 AM    RBC 4.99 08/15/2012 11:05 AM    HEMOGLOBIN 15.1 06/05/2020 08:05 AM    HEMATOCRIT 45.0 06/05/2020 08:05 AM    MCV 91.6 06/05/2020 08:05 AM    MCV 90 08/15/2012 11:05 AM    MCH 30.8 06/05/2020 08:05 AM    MCH 30.1 08/15/2012 11:05 AM    MCHC 33.6 06/05/2020 08:05 AM    MPV 10.3 06/05/2020 08:05 AM    NEUTSPOLYS 61.20 06/05/2020 08:05 AM    LYMPHOCYTES 28.40 06/05/2020 08:05 AM    MONOCYTES 8.70 06/05/2020 08:05 AM    EOSINOPHILS 0.90 06/05/2020 08:05 AM    BASOPHILS 0.50 06/05/2020 08:05 AM          Assessment/Plan:  1. Morbid obesity with BMI of 45.0-49.9, adult (HCC)  Chronic condition, patient continues to follow-up with health management program.  She has lost 10 pounds since her last visit.  Keep up the excellent work!  2. Hyponatremia  Chronic condition, I do suspect that her diuretic could be contributing factor to her chronically low sodium.  She is on 1/2 tablet triamterene/hydrochlorothiazide 37.5/25 mg.  At this time I do recommend that we discontinue this medication and try an alternative blood pressure medication.  Going to trial losartan 25 mg daily to start.  At like the patient to keep a blood pressure diary for me over the next couple of weeks, message me with any side effects or complications.  3. Hypothyroidism due to Hashimoto's thyroiditis  Chronic condition, TSH stable.  Patient continues to feel very well on [place] Thyroid.  Continue 60 mg daily.  Repeat labs in 6 months.  4. Essential hypertension  Chronic condition.   Stable.  Because of her low sodium going to trial losartan 25 mg daily, discontinue triamterene/hydrochlorothiazide.  Repeat BMP in 4 weeks.  5. High risk medication use  - Basic Metabolic Panel; Future  Trial of losartan, repeat electrolytes and kidney function in 4 weeks.  6. Multiple thyroid nodules  - US-THYROID; Future  History of multiple thyroid nodules, due for repeat annual ultrasound in June 2021.  Other orders  - losartan (COZAAR) 25 MG Tab; Take 1 tablet by mouth every day.  Dispense: 30 tablet; Refill: 2  - EPINEPHrine (EPIPEN) 0.3 MG/0.3ML Solution Auto-injector solution for injection; INJECT INTO THIGH UTD  Dispense: 1 Each; Refill: 0       Follow-up: Return in about 4 weeks (around 4/20/2021) for Follow up on labs and blood pressure.    Please note that this dictation was created using voice recognition software. I have made every reasonable attempt to correct obvious errors, but I expect that there are errors of grammar and possibly content that I did not discover before finalizing the note.

## 2021-03-23 NOTE — ASSESSMENT & PLAN NOTE
Lab Results   Component Value Date/Time    SODIUM 129 (L) 03/19/2021 09:03 AM    POTASSIUM 4.4 03/19/2021 09:03 AM    CHLORIDE 94 (L) 03/19/2021 09:03 AM    CO2 25 03/19/2021 09:03 AM    ANION 10.0 03/19/2021 09:03 AM    GLUCOSE 90 03/19/2021 09:03 AM    BUN 12 03/19/2021 09:03 AM    CREATININE 0.41 (L) 03/19/2021 09:03 AM    CREATININE 0.59 02/08/2013 08:05 AM    CALCIUM 9.6 03/19/2021 09:03 AM    ASTSGOT 19 06/05/2020 08:05 AM    ALTSGPT 28 06/05/2020 08:05 AM    TBILIRUBIN 0.6 06/05/2020 08:05 AM    ALBUMIN 4.3 06/05/2020 08:05 AM    TOTPROTEIN 7.1 06/05/2020 08:05 AM    GLOBULIN 2.8 06/05/2020 08:05 AM    AGRATIO 1.5 06/05/2020 08:05 AM   ]  Suspect her hydrochlorothiazide could be contributing factor to her repeat hyponatremia.  Urine sodium level was within normal limits.

## 2021-03-23 NOTE — ASSESSMENT & PLAN NOTE
Discussed going off triamterene/HCTZ and trial alternative medication. Discussed trial losartan instead.

## 2021-03-23 NOTE — ASSESSMENT & PLAN NOTE
Patient history of thyroid nodules. Due repeat labs 06/21- then if stable. Repeat 2 years then 5 years.

## 2021-03-25 RX ORDER — LOSARTAN POTASSIUM 25 MG/1
25 TABLET ORAL DAILY
Qty: 100 TABLET | Refills: 0 | Status: SHIPPED | OUTPATIENT
Start: 2021-03-25 | End: 2021-04-21

## 2021-03-30 RX ORDER — THYROID 60 MG/1
TABLET ORAL
Qty: 40 TABLET | Refills: 3 | Status: SHIPPED | OUTPATIENT
Start: 2021-03-30 | End: 2021-08-09 | Stop reason: SDUPTHER

## 2021-04-16 ENCOUNTER — HOSPITAL ENCOUNTER (OUTPATIENT)
Dept: LAB | Facility: MEDICAL CENTER | Age: 66
End: 2021-04-16
Attending: PHYSICIAN ASSISTANT
Payer: MEDICARE

## 2021-04-16 DIAGNOSIS — Z79.899 HIGH RISK MEDICATION USE: ICD-10-CM

## 2021-04-16 LAB
ANION GAP SERPL CALC-SCNC: 8 MMOL/L (ref 7–16)
BUN SERPL-MCNC: 10 MG/DL (ref 8–22)
CALCIUM SERPL-MCNC: 9.4 MG/DL (ref 8.5–10.5)
CHLORIDE SERPL-SCNC: 100 MMOL/L (ref 96–112)
CO2 SERPL-SCNC: 28 MMOL/L (ref 20–33)
CREAT SERPL-MCNC: 0.42 MG/DL (ref 0.5–1.4)
FASTING STATUS PATIENT QL REPORTED: NORMAL
GLUCOSE SERPL-MCNC: 83 MG/DL (ref 65–99)
POTASSIUM SERPL-SCNC: 4.8 MMOL/L (ref 3.6–5.5)
SODIUM SERPL-SCNC: 136 MMOL/L (ref 135–145)

## 2021-04-16 PROCEDURE — 80048 BASIC METABOLIC PNL TOTAL CA: CPT

## 2021-04-16 PROCEDURE — 36415 COLL VENOUS BLD VENIPUNCTURE: CPT

## 2021-04-20 NOTE — ASSESSMENT & PLAN NOTE
At her last visit we discontinued her diuretic in hopes of normalizing her low sodium level.  But patient did not tolerate losartan 25 mg so went back on her original medication of triamterene/hydrochlorothiazide 37.5/25 mg half a tab a day.  Good news is her sodium level upon repeat labs is now normal.  We will continue to monitor.

## 2021-04-21 ENCOUNTER — OFFICE VISIT (OUTPATIENT)
Dept: MEDICAL GROUP | Facility: PHYSICIAN GROUP | Age: 66
End: 2021-04-21
Payer: MEDICARE

## 2021-04-21 VITALS
WEIGHT: 267 LBS | OXYGEN SATURATION: 96 % | DIASTOLIC BLOOD PRESSURE: 76 MMHG | SYSTOLIC BLOOD PRESSURE: 120 MMHG | HEART RATE: 69 BPM | RESPIRATION RATE: 14 BRPM | TEMPERATURE: 97 F | BODY MASS INDEX: 42.91 KG/M2 | HEIGHT: 66 IN

## 2021-04-21 DIAGNOSIS — I10 ESSENTIAL HYPERTENSION: ICD-10-CM

## 2021-04-21 DIAGNOSIS — E03.8 HYPOTHYROIDISM DUE TO HASHIMOTO'S THYROIDITIS: ICD-10-CM

## 2021-04-21 DIAGNOSIS — Z79.899 HIGH RISK MEDICATION USE: ICD-10-CM

## 2021-04-21 DIAGNOSIS — E06.3 HYPOTHYROIDISM DUE TO HASHIMOTO'S THYROIDITIS: ICD-10-CM

## 2021-04-21 DIAGNOSIS — E66.01 MORBID OBESITY WITH BMI OF 45.0-49.9, ADULT (HCC): ICD-10-CM

## 2021-04-21 PROCEDURE — 99213 OFFICE O/P EST LOW 20 MIN: CPT | Performed by: PHYSICIAN ASSISTANT

## 2021-04-21 RX ORDER — TRIAMTERENE AND HYDROCHLOROTHIAZIDE 37.5; 25 MG/1; MG/1
1 TABLET ORAL DAILY
COMMUNITY
End: 2021-08-31

## 2021-04-21 ASSESSMENT — FIBROSIS 4 INDEX: FIB4 SCORE: 0.98

## 2021-04-21 NOTE — PROGRESS NOTES
CC:   Chief Complaint   Patient presents with   • Lab Results   • Hypothyroidism          HISTORY OF PRESENT ILLNESS: Patient is a 65 y.o. female established patient who presents today to follow up on the following conditions:        Health Maintenance: Completed  Hep C screen- low risk, declines screening.     Hypertension  At her last visit we discontinued her diuretic in hopes of normalizing her low sodium level.  But patient did not tolerate losartan 25 mg so went back on her original medication of triamterene/hydrochlorothiazide 37.5/25 mg half a tab a day.  Good news is her sodium level upon repeat labs is now normal.  We will continue to monitor.    Morbid obesity with BMI of 45.0-49.9, adult ([facility])  Patient is down another 3 pounds since last visit. Doing well with carb modifications.       Patient Active Problem List    Diagnosis Date Noted   • Food allergy 07/08/2020   • Multiple thyroid nodules 07/08/2020   • Hyponatremia 07/07/2020   • Binge eating 06/22/2020   • Non-smoker 06/10/2019   • Morbid obesity with BMI of 45.0-49.9, adult ([facility]) 06/28/2018   • Chronic thyroiditis 02/12/2013   • Vitamin D deficiency 02/16/2012   • Sleep apnea    • Hypertension 02/22/2010   • Hypothyroidism 02/22/2010   • Goiter       Allergies:Nitrofurantoin, Aspirin, Codeine, Fish, Maxepa, and Peanut oil    Current Outpatient Medications   Medication Sig Dispense Refill   • triamterene-hctz (MAXZIDE-25/DYAZIDE) 37.5-25 MG Tab Take 1 tablet by mouth every day.     • thyroid (ARMOUR THYROID) 60 MG Tab TAKE ONE FOURTH TO ONE TABLET BY MOUTH FIRST THING IN THE MORNING WITH ONLY WATER FOR 60 MINUTES 40 tablet 3   • EPINEPHrine (EPIPEN) 0.3 MG/0.3ML Solution Auto-injector solution for injection INJECT INTO THIGH UTD 1 Each 0   • Coenzyme Q10 (CO Q 10 PO) Take  by mouth.     • Fexofenadine HCl (ALLEGRA ALLERGY PO) Take  by mouth.     • Fluticasone Propionate (FLONASE NA) Spray  in nose.     • ELDERBERRY PO Take  by mouth.     • vitamin  "D, Ergocalciferol, (DRISDOL) 1.25 MG (40854 UT) Cap capsule Take  by mouth every 7 days.     • Flaxseed, Linseed, (FLAX SEED OIL PO) Take  by mouth.     • Probiotic Product (PROBIOTIC-10 PO) Take  by mouth.       No current facility-administered medications for this visit.       Social History     Tobacco Use   • Smoking status: Never Smoker   • Smokeless tobacco: Never Used   Substance Use Topics   • Alcohol use: Yes   • Drug use: No     Social History     Social History Narrative   • Not on file       Family History   Problem Relation Age of Onset   • Thyroid Mother    • Cancer Mother         breast   • Heart Attack Father    • Sleep Apnea Father    • Arterial Aneurysm Father    • Sleep Apnea Brother    • Cancer Maternal Aunt         breast   • No Known Problems Sister    • Diabetes Maternal Grandmother    • No Known Problems Son    • Thyroid Daughter        Review of Systems:    Constitutional: No Fevers, Chills  Eyes: No vision changes  ENT: No hearing changes  Resp: No Shortness of breath  CV: No Chest pain  GI: No Nausea/Vomiting  MSK: No weakness  Skin: No rashes  Neuro: No Headaches  Psych: No Suicidal ideations    All remaining systems reviewed and found to be negative, except as stated above.    Exam:    /76   Pulse 69   Temp 36.1 °C (97 °F) (Temporal)   Resp 14   Ht 1.676 m (5' 6\")   Wt 121 kg (267 lb)   SpO2 96%  Body mass index is 43.09 kg/m².    General:  Well nourished, well developed female in NAD  HENT: Atraumatic, normocephalic  EYES: Extraocular movements intact  NECK: Supple, FROM  CHEST: No deformities, Equal chest expansion  RESP: Unlabored, no stridor or audible wheeze  HEART: Regular Rate and rhythm.   Extremities: No Clubbing, Cyanosis, or Edema  Skin: Warm/dry, without rashes  Neuro: A/O x 4, due to COVID-19- did not have patient remove face mask to test cranial nerves.  Motor/sensory grossly intact  Psych: Normal behavior, normal affect      Lab review:  Labs are reviewed and " discussed with a patient  Lab Results   Component Value Date/Time    CHOLSTRLTOT 168 06/05/2020 08:05 AM    LDL 82 06/05/2020 08:05 AM    HDL 76 06/05/2020 08:05 AM    TRIGLYCERIDE 48 06/05/2020 08:05 AM       Lab Results   Component Value Date/Time    SODIUM 136 04/16/2021 07:54 AM    POTASSIUM 4.8 04/16/2021 07:54 AM    CHLORIDE 100 04/16/2021 07:54 AM    CO2 28 04/16/2021 07:54 AM    GLUCOSE 83 04/16/2021 07:54 AM    BUN 10 04/16/2021 07:54 AM    CREATININE 0.42 (L) 04/16/2021 07:54 AM    CREATININE 0.59 02/08/2013 08:05 AM    BUNCREATRAT 17 02/08/2013 08:05 AM    GLOMRATE >59 03/03/2010 09:30 AM     Lab Results   Component Value Date/Time    ALKPHOSPHAT 96 06/05/2020 08:05 AM    ASTSGOT 19 06/05/2020 08:05 AM    ALTSGPT 28 06/05/2020 08:05 AM    TBILIRUBIN 0.6 06/05/2020 08:05 AM      No results found for: HBA1C  Lab Results   Component Value Date/Time    TSH 2.140 09/06/2013 08:37 AM    TSH 1.700 02/08/2013 08:05 AM     Lab Results   Component Value Date/Time    FREET4 1.01 03/19/2021 09:03 AM    FREET4 1.10 11/17/2020 11:48 AM       Lab Results   Component Value Date/Time    WBC 5.7 06/05/2020 08:05 AM    WBC 6.7 08/15/2012 11:05 AM    RBC 4.91 06/05/2020 08:05 AM    RBC 4.99 08/15/2012 11:05 AM    HEMOGLOBIN 15.1 06/05/2020 08:05 AM    HEMATOCRIT 45.0 06/05/2020 08:05 AM    MCV 91.6 06/05/2020 08:05 AM    MCV 90 08/15/2012 11:05 AM    MCH 30.8 06/05/2020 08:05 AM    MCH 30.1 08/15/2012 11:05 AM    MCHC 33.6 06/05/2020 08:05 AM    MPV 10.3 06/05/2020 08:05 AM    NEUTSPOLYS 61.20 06/05/2020 08:05 AM    LYMPHOCYTES 28.40 06/05/2020 08:05 AM    MONOCYTES 8.70 06/05/2020 08:05 AM    EOSINOPHILS 0.90 06/05/2020 08:05 AM    BASOPHILS 0.50 06/05/2020 08:05 AM          Assessment/Plan:  1. Essential hypertension  Chronic condition.  Stable.  Continue hydrochlorothiazide triamterene.  Sodium within normal limits.  2. Morbid obesity with BMI of 45.0-49.9, adult ([facility])  Excellent work, patient is down another 3 pounds!   Keep up the great work!  3. High risk medication use  - Basic Metabolic Panel; Future  Patient is on diuretic we will get repeat electrolytes and kidney function test with next the labs.  4. Hypothyroidism due to Hashimoto's thyroiditis  - TSH; Future  - T3 FREE; Future  - FREE THYROXINE; Future  Chronic condition.  Stable.  Continue [place] Thyroid 60 mg daily.  Patient continues to feel significantly better on [place].    Follow-up: Return in about 6 months (around 10/21/2021) for Follow up on labs.    Please note that this dictation was created using voice recognition software. I have made every reasonable attempt to correct obvious errors, but I expect that there are errors of grammar and possibly content that I did not discover before finalizing the note.

## 2021-06-14 ENCOUNTER — OFFICE VISIT (OUTPATIENT)
Dept: SLEEP MEDICINE | Facility: MEDICAL CENTER | Age: 66
End: 2021-06-14
Payer: MEDICARE

## 2021-06-14 VITALS
BODY MASS INDEX: 42.59 KG/M2 | DIASTOLIC BLOOD PRESSURE: 84 MMHG | HEIGHT: 66 IN | HEART RATE: 76 BPM | OXYGEN SATURATION: 97 % | SYSTOLIC BLOOD PRESSURE: 132 MMHG | WEIGHT: 265 LBS

## 2021-06-14 DIAGNOSIS — I10 ESSENTIAL HYPERTENSION: ICD-10-CM

## 2021-06-14 DIAGNOSIS — G47.33 OSA (OBSTRUCTIVE SLEEP APNEA): ICD-10-CM

## 2021-06-14 PROCEDURE — 99213 OFFICE O/P EST LOW 20 MIN: CPT | Performed by: NURSE PRACTITIONER

## 2021-06-14 ASSESSMENT — FIBROSIS 4 INDEX: FIB4 SCORE: 0.98

## 2021-06-14 NOTE — PROGRESS NOTES
Chief Complaint   Patient presents with   • Follow-Up     RAISA-Last seen 06/12/2020       HPI:      Mrs. Pereira is a 64 y/o female patient who is in today for annual RAISA f/u. PMH includes HTN, hypothyroidism due to Hashimoto's thyroiditis, goiter, RAISA, morbid obesity, binge eating, never smoker.    Compliance report from 5/14/21-6/12/21 was downloaded and reviewed with the patient which showed CPAP 14 cmH2O, 100% compliance, 8 hrs 11 min use, AHI of 0.1. She is tolerating the pressure and mask well. She goes to bed between 9-10 pm and wakes up at 5 am in the summer and sleeps in more in the winter.  She sleeps really well through the night and feels more rested while using the CPAP.  She denies morning headache or snoring. She follows up with PCP for BP management, but has been eating out more due to family in town and the elevated BP could be related to increased sodium intake. She takes her BP medication as directed. Her daughter is expecting a baby in the next 48 hrs which has been very excited and stressful at the same time. She stays active by going to the gym twice a week and participates in silver sneakers. She was doing Zack Chi but this is difficult on her left knee which may need to be replaced. She stopped seeing Dr. Vazquez for now until the fall while she continues to follow [facility] weight loss program and further adjustments to her thyroid medication.  She hopes to resume the gym regimen up to 5 days a week.    Sleep Study History:   Sleep study performed on October 17, 2011 showed an AHI of 36.9, REM index of 70, and a low saturation of 62%.    ROS:    Constitutional: Denies fevers, Denies weight changes  Eyes: Denies changes in vision, no eye pain  Ears/Nose/Throat/Mouth: Denies nasal congestion or sore throat   Cardiovascular: Denies chest pain or palpitations   Respiratory: Denies shortness of breath , Denies cough  Gastrointestinal/Hepatic: Denies abdominal pain, nausea, vomiting,   Skin/Breast:  Denies rash,   Neurological: Denies headache, confusion,   Psychiatric: denies mood disorder   Sleep: denies snoring       Past Medical History:   Diagnosis Date   • Chickenpox    • [place] measles    • Hyperlipidemia    • Hypothyroidism    • Influenza    • Mumps    • Obesity    • Sleep apnea 2011    on CPAP   • thyroid nodules 2000    biopsy Hashimoto's thyroiditis / TPO ab neg       Past Surgical History:   Procedure Laterality Date   • PRIMARY C SECTION         Family History   Problem Relation Age of Onset   • Thyroid Mother    • Cancer Mother         breast   • Heart Attack Father    • Sleep Apnea Father    • Arterial Aneurysm Father    • Sleep Apnea Brother    • Cancer Maternal Aunt         breast   • No Known Problems Sister    • Diabetes Maternal Grandmother    • No Known Problems Son    • Thyroid Daughter        Social History     Socioeconomic History   • Marital status:      Spouse name: Not on file   • Number of children: Not on file   • Years of education: Not on file   • Highest education level: Not on file   Occupational History   • Not on file   Tobacco Use   • Smoking status: Never Smoker   • Smokeless tobacco: Never Used   Vaping Use   • Vaping Use: Never used   Substance and Sexual Activity   • Alcohol use: Yes   • Drug use: No   • Sexual activity: Not Currently   Other Topics Concern   • Not on file   Social History Narrative   • Not on file     Social Determinants of Health     Financial Resource Strain:    • Difficulty of Paying Living Expenses:    Food Insecurity:    • Worried About Running Out of Food in the Last Year:    • Ran Out of Food in the Last Year:    Transportation Needs:    • Lack of Transportation (Medical):    • Lack of Transportation (Non-Medical):    Physical Activity:    • Days of Exercise per Week:    • Minutes of Exercise per Session:    Stress:    • Feeling of Stress :    Social Connections:    • Frequency of Communication with Friends and Family:    • Frequency of  Social Gatherings with Friends and Family:    • Attends [employer] Services:    • Active Member of Clubs or Organizations:    • Attends Club or Organization Meetings:    • Marital Status:    Intimate Partner Violence:    • Fear of Current or Ex-Partner:    • Emotionally Abused:    • Physically Abused:    • Sexually Abused:        Allergies as of 06/14/2021 - Reviewed 06/14/2021   Allergen Reaction Noted   • Nitrofurantoin Rash 06/22/2020   • Aspirin  06/24/2017   • Codeine  06/24/2017   • Fish  06/24/2017   • Maxepa  03/29/2010   • Peanut oil  03/29/2010        Vitals:  Vitals:    06/14/21 1015   BP: 132/84   Pulse: 76   SpO2: 97%       Current medications as of today   Current Outpatient Medications   Medication Sig Dispense Refill   • triamterene-hctz (MAXZIDE-25/DYAZIDE) 37.5-25 MG Tab Take 1 tablet by mouth every day.     • thyroid (ARMOUR THYROID) 60 MG Tab TAKE ONE FOURTH TO ONE TABLET BY MOUTH FIRST THING IN THE MORNING WITH ONLY WATER FOR 60 MINUTES 40 tablet 3   • EPINEPHrine (EPIPEN) 0.3 MG/0.3ML Solution Auto-injector solution for injection INJECT INTO THIGH UTD 1 Each 0   • Coenzyme Q10 (CO Q 10 PO) Take  by mouth.     • Fexofenadine HCl (ALLEGRA ALLERGY PO) Take  by mouth.     • Fluticasone Propionate (FLONASE NA) Spray  in nose.     • ELDERBERRY PO Take  by mouth.     • vitamin D, Ergocalciferol, (DRISDOL) 1.25 MG (31548 UT) Cap capsule Take  by mouth every 7 days.     • Flaxseed, Linseed, (FLAX SEED OIL PO) Take  by mouth.     • Probiotic Product (PROBIOTIC-10 PO) Take  by mouth.       No current facility-administered medications for this visit.         Physical Exam: Limited by COVID-19 precautions.  Appearance: Well developed, well nourished, no acute distress  Eyes: PERRL, EOM intact, sclera white, conjunctiva moist  Ears: no lesions or deformities  Hearing: grossly intact  Nose: no lesions or deformities  Respiratory effort: no intercostal retractions or use of accessory muscles  Extremities: no  cyanosis or edema  Abdomen: soft   Gait and Station: normal  Digits and nails: no clubbing, cyanosis, petechiae or nodes.  Cranial nerves: grossly intact  Skin: no visible rashes, lesions or ulcers noted  Orientation: Oriented to time, person and place  Mood and affect: mood and affect appropriate, normal interaction with examiner  Judgement: Intact    Assessment:  1. RAISA (obstructive sleep apnea)     2. Essential hypertension     3. BMI 40.0-44.9, adult (HCC)           Plan  Discussed the cardiovascular and neuropsychiatric risks of untreated RAISA; including but not limited to: HTN, DM, MI, ASCVD, CVA, CHF, traffic accidents.     1. Compliance report from 5/14/21-6/12/21 was downloaded and reviewed with the patient which showed CPAP 14 cmH2O, 100% compliance, 8 hrs 11 min use, AHI of 0.1. Continue CPAP. Patient is compliant and benefiting from CPAP therapy for management of RAISA. Advised patient to continue to use the CPAP every night for more than four hours for optimal health benefit.    *DME order ([facility]) for mask (nasal mask or MOC) and supplies was provided today. Continue to clean mask and supplies weekly with soap and water, and change supplies per insurance guidelines.     2. Continue to f/u with PCP for BP management.  3. Encouraged to continue with a healthy diet and exercise to help with weight loss and management.    4.  Sleep hygiene discussed. Recommend keeping a set sleep/wake schedule. Logging enough hours of sleep. Limiting/Avoiding naps. No caffeine after noon and no heavy meals in the evening.   5. Follow up with the appropriate healthcare practitioners for all other medical problems.  6. F/u in 1 year for RAISA, sooner if needed.       MARY Esquivel.      This dictation was created using voice recognition software. The accuracy of the dictation is limited to the abilities of the software. I expect there may be some errors of grammar and possibly content.

## 2021-06-23 ENCOUNTER — TELEPHONE (OUTPATIENT)
Dept: MEDICAL GROUP | Facility: PHYSICIAN GROUP | Age: 66
End: 2021-06-23

## 2021-06-23 ENCOUNTER — HOSPITAL ENCOUNTER (OUTPATIENT)
Dept: RADIOLOGY | Facility: MEDICAL CENTER | Age: 66
End: 2021-06-23
Attending: PHYSICIAN ASSISTANT
Payer: MEDICARE

## 2021-06-23 DIAGNOSIS — E04.2 MULTIPLE THYROID NODULES: ICD-10-CM

## 2021-06-23 PROCEDURE — 76536 US EXAM OF HEAD AND NECK: CPT

## 2021-07-06 ENCOUNTER — PATIENT MESSAGE (OUTPATIENT)
Dept: MEDICAL GROUP | Facility: PHYSICIAN GROUP | Age: 66
End: 2021-07-06

## 2021-07-06 DIAGNOSIS — N64.4 BREAST PAIN: ICD-10-CM

## 2021-07-09 ENCOUNTER — HOSPITAL ENCOUNTER (OUTPATIENT)
Dept: RADIOLOGY | Facility: MEDICAL CENTER | Age: 66
End: 2021-07-09
Attending: PHYSICIAN ASSISTANT
Payer: MEDICARE

## 2021-07-09 ENCOUNTER — TELEPHONE (OUTPATIENT)
Dept: MEDICAL GROUP | Facility: PHYSICIAN GROUP | Age: 66
End: 2021-07-09

## 2021-07-09 DIAGNOSIS — N64.4 BREAST PAIN: ICD-10-CM

## 2021-07-09 PROCEDURE — 76642 ULTRASOUND BREAST LIMITED: CPT | Mod: LT

## 2021-07-09 PROCEDURE — G0279 TOMOSYNTHESIS, MAMMO: HCPCS

## 2021-07-09 NOTE — TELEPHONE ENCOUNTER
----- Message from Shey Pastrana P.A.-C. sent at 7/9/2021 12:40 PM PDT -----  Please call patient about their results.     Results showed: No interval change the mammographic appearance of either breast and no new suspicious findings identified. Annual mammographic follow-up recommended. No focal ultrasound abnormality appreciated in areas of patient complaint of left breast pain.     Thank you,    Shey Pastrana PA-C

## 2021-08-09 RX ORDER — THYROID 60 MG/1
TABLET ORAL
Qty: 40 TABLET | Refills: 3 | Status: SHIPPED | OUTPATIENT
Start: 2021-08-09 | End: 2021-12-09 | Stop reason: SDUPTHER

## 2021-08-09 NOTE — TELEPHONE ENCOUNTER
----- Message from Adelaide Pereira sent at 8/9/2021 12:18 PM PDT -----  Regarding: Prescription Question  Contact: [phone]  Good Day, Shey,  I am not able to refill my [place] Thyroid medication because the answering system at [facility] keeps looping.  Would you please call in a refill for me as I do your authorization this time.  My next blood test is in October.  Thank you, Adelaide Pereira

## 2021-08-12 DIAGNOSIS — M79.603 PAIN OF UPPER EXTREMITY, UNSPECIFIED LATERALITY: ICD-10-CM

## 2021-08-12 NOTE — PROGRESS NOTES
Patient requesting referral to PT for arm pain. Referral sent today as Shey is out of the office.

## 2021-08-31 RX ORDER — TRIAMTERENE AND HYDROCHLOROTHIAZIDE 37.5; 25 MG/1; MG/1
TABLET ORAL
Qty: 45 TABLET | Refills: 0 | Status: SHIPPED | OUTPATIENT
Start: 2021-08-31 | End: 2021-11-29 | Stop reason: SDUPTHER

## 2021-10-14 ENCOUNTER — HOSPITAL ENCOUNTER (OUTPATIENT)
Dept: LAB | Facility: MEDICAL CENTER | Age: 66
End: 2021-10-14
Attending: PHYSICIAN ASSISTANT
Payer: MEDICARE

## 2021-10-14 DIAGNOSIS — E06.3 HYPOTHYROIDISM DUE TO HASHIMOTO'S THYROIDITIS: ICD-10-CM

## 2021-10-14 DIAGNOSIS — E03.8 HYPOTHYROIDISM DUE TO HASHIMOTO'S THYROIDITIS: ICD-10-CM

## 2021-10-14 DIAGNOSIS — Z79.899 HIGH RISK MEDICATION USE: ICD-10-CM

## 2021-10-14 LAB
ANION GAP SERPL CALC-SCNC: 10 MMOL/L (ref 7–16)
BUN SERPL-MCNC: 13 MG/DL (ref 8–22)
CALCIUM SERPL-MCNC: 9.6 MG/DL (ref 8.5–10.5)
CHLORIDE SERPL-SCNC: 97 MMOL/L (ref 96–112)
CO2 SERPL-SCNC: 26 MMOL/L (ref 20–33)
CREAT SERPL-MCNC: 0.45 MG/DL (ref 0.5–1.4)
FASTING STATUS PATIENT QL REPORTED: NORMAL
GLUCOSE SERPL-MCNC: 72 MG/DL (ref 65–99)
POTASSIUM SERPL-SCNC: 4.7 MMOL/L (ref 3.6–5.5)
SODIUM SERPL-SCNC: 133 MMOL/L (ref 135–145)
T3FREE SERPL-MCNC: 3.9 PG/ML (ref 2–4.4)
T4 FREE SERPL-MCNC: 1.08 NG/DL (ref 0.93–1.7)
TSH SERPL DL<=0.005 MIU/L-ACNC: 1.69 UIU/ML (ref 0.38–5.33)

## 2021-10-14 PROCEDURE — 84443 ASSAY THYROID STIM HORMONE: CPT

## 2021-10-14 PROCEDURE — 84481 FREE ASSAY (FT-3): CPT

## 2021-10-14 PROCEDURE — 84439 ASSAY OF FREE THYROXINE: CPT

## 2021-10-14 PROCEDURE — 36415 COLL VENOUS BLD VENIPUNCTURE: CPT

## 2021-10-14 PROCEDURE — 80048 BASIC METABOLIC PNL TOTAL CA: CPT

## 2021-10-21 ENCOUNTER — OFFICE VISIT (OUTPATIENT)
Dept: MEDICAL GROUP | Facility: PHYSICIAN GROUP | Age: 66
End: 2021-10-21
Payer: MEDICARE

## 2021-10-21 VITALS
RESPIRATION RATE: 16 BRPM | WEIGHT: 271 LBS | DIASTOLIC BLOOD PRESSURE: 80 MMHG | TEMPERATURE: 96.5 F | HEIGHT: 66 IN | OXYGEN SATURATION: 96 % | SYSTOLIC BLOOD PRESSURE: 136 MMHG | HEART RATE: 76 BPM | BODY MASS INDEX: 43.55 KG/M2

## 2021-10-21 DIAGNOSIS — E87.1 HYPONATREMIA: ICD-10-CM

## 2021-10-21 DIAGNOSIS — E06.3 HYPOTHYROIDISM DUE TO HASHIMOTO'S THYROIDITIS: ICD-10-CM

## 2021-10-21 DIAGNOSIS — E03.8 HYPOTHYROIDISM DUE TO HASHIMOTO'S THYROIDITIS: ICD-10-CM

## 2021-10-21 DIAGNOSIS — Z13.6 SCREENING FOR CARDIOVASCULAR CONDITION: ICD-10-CM

## 2021-10-21 DIAGNOSIS — Z13.0 SCREENING FOR DEFICIENCY ANEMIA: ICD-10-CM

## 2021-10-21 PROCEDURE — 99213 OFFICE O/P EST LOW 20 MIN: CPT | Performed by: PHYSICIAN ASSISTANT

## 2021-10-21 ASSESSMENT — FIBROSIS 4 INDEX: FIB4 SCORE: 0.99

## 2021-10-21 NOTE — ASSESSMENT & PLAN NOTE
Sodium slightly low at 133 rest of BMP within normal limits.  Patient continues triamterene-hydrochlorothiazide 37.5-25 mg, she takes a half tab daily.  Long FH of low sodium on father's side. Her father and daughter have this history.

## 2021-10-21 NOTE — PROGRESS NOTES
CC:   Chief Complaint   Patient presents with   • Lab Results   • Hypothyroidism          HISTORY OF PRESENT ILLNESS: Patient is a 66 y.o. female established patient who presents today to follow up on the following conditions:     Hypothyroidism  This is a  chronic condition.   Last TSH level:   TSH   Date Value Ref Range Status   10/14/2021 1.690 0.380 - 5.330 uIU/mL Final     Comment:     Reference Range:    Pregnant Females, 1st Trimester  0.050-3.700  Pregnant Females, 2nd Trimester  0.310-4.350  Pregnant Females, 3rd Trimester  0.410-5.180       Current Medication: Patient continues [place] Thyroid 60 mg daily.  Side effects to medication:   Denies any chronic fatigue, constipation, dry skin, weight gain, heat/cold intolerance, or hair loss.       Hyponatremia  Sodium slightly low at 133 rest of BMP within normal limits.  Patient continues triamterene-hydrochlorothiazide 37.5-25 mg, she takes a half tab daily.  Long FH of low sodium on father's side. Her father and daughter have this history.       Patient Active Problem List    Diagnosis Date Noted   • Food allergy 07/08/2020   • Multiple thyroid nodules 07/08/2020   • Hyponatremia 07/07/2020   • Binge eating 06/22/2020   • Non-smoker 06/10/2019   • Morbid obesity with BMI of 45.0-49.9, adult ([facility]) 06/28/2018   • Chronic thyroiditis 02/12/2013   • Vitamin D deficiency 02/16/2012   • Sleep apnea    • Hypertension 02/22/2010   • Hypothyroidism 02/22/2010   • Goiter       Allergies:Nitrofurantoin, Aspirin, Codeine, Fish, Maxepa, Peanut oil, and Tree nuts food allergy    Current Outpatient Medications   Medication Sig Dispense Refill   • triamterene-hctz (MAXZIDE-25/DYAZIDE) 37.5-25 MG Tab TAKE 1/2 TABLET BY MOUTH DAILY 45 Tablet 0   • thyroid (ARMOUR THYROID) 60 MG Tab TAKE ONE FOURTH TO ONE TABLET BY MOUTH FIRST THING IN THE MORNING WITH ONLY WATER FOR 60 MINUTES 40 tablet 3   • EPINEPHrine (EPIPEN) 0.3 MG/0.3ML Solution Auto-injector solution for injection  "INJECT INTO THIGH UTD 1 Each 0   • Coenzyme Q10 (CO Q 10 PO) Take  by mouth.     • Fexofenadine HCl (ALLEGRA ALLERGY PO) Take  by mouth.     • Fluticasone Propionate (FLONASE NA) Spray  in nose.     • ELDERBERRY PO Take  by mouth.     • vitamin D, Ergocalciferol, (DRISDOL) 1.25 MG (43147 UT) Cap capsule Take  by mouth every 7 days.     • Flaxseed, Linseed, (FLAX SEED OIL PO) Take  by mouth.     • Probiotic Product (PROBIOTIC-10 PO) Take  by mouth.       No current facility-administered medications for this visit.       Social History     Tobacco Use   • Smoking status: Never Smoker   • Smokeless tobacco: Never Used   Vaping Use   • Vaping Use: Never used   Substance Use Topics   • Alcohol use: Yes     Comment: rare   • Drug use: No     Social History     Social History Narrative   • Not on file       Family History   Problem Relation Age of Onset   • Thyroid Mother    • Cancer Mother         breast   • Heart Attack Father    • Sleep Apnea Father    • Arterial Aneurysm Father    • Sleep Apnea Brother    • Cancer Maternal Aunt         breast   • No Known Problems Sister    • Diabetes Maternal Grandmother    • No Known Problems Son    • Thyroid Daughter        Review of Systems:    Constitutional: No Fevers, Chills  Eyes: No vision changes  ENT: No hearing changes  Resp: No Shortness of breath  CV: No Chest pain  GI: No Nausea/Vomiting  MSK: No weakness  Skin: No rashes  Neuro: No Headaches  Psych: No Suicidal ideations    All remaining systems reviewed and found to be negative, except as stated above.    Exam:    /80   Pulse 76   Temp 35.8 °C (96.5 °F)   Resp 16   Ht 1.676 m (5' 6\")   Wt 123 kg (271 lb)   SpO2 96%  Body mass index is 43.74 kg/m².    General:  Well nourished, well developed female in NAD  HENT: Atraumatic, normocephalic  EYES: Extraocular movements intact  NECK: Supple, FROM  CHEST: No deformities, Equal chest expansion  RESP: Unlabored, no stridor or audible wheeze  HEART: Regular Rate and " rhythm.   Extremities: No Clubbing, Cyanosis, or Edema  Skin: Warm/dry, without rashes  Neuro: A/O x 4, due to COVID-19- did not have patient remove face mask to test cranial nerves.  Motor/sensory grossly intact  Psych: Normal behavior, normal affect      Lab review:  Labs are reviewed and discussed with a patient  Lab Results   Component Value Date/Time    CHOLSTRLTOT 168 06/05/2020 08:05 AM    LDL 82 06/05/2020 08:05 AM    HDL 76 06/05/2020 08:05 AM    TRIGLYCERIDE 48 06/05/2020 08:05 AM       Lab Results   Component Value Date/Time    SODIUM 133 (L) 10/14/2021 07:47 AM    POTASSIUM 4.7 10/14/2021 07:47 AM    CHLORIDE 97 10/14/2021 07:47 AM    CO2 26 10/14/2021 07:47 AM    GLUCOSE 72 10/14/2021 07:47 AM    BUN 13 10/14/2021 07:47 AM    CREATININE 0.45 (L) 10/14/2021 07:47 AM    CREATININE 0.59 02/08/2013 08:05 AM    BUNCREATRAT 17 02/08/2013 08:05 AM    GLOMRATE >59 03/03/2010 09:30 AM     Lab Results   Component Value Date/Time    ALKPHOSPHAT 96 06/05/2020 08:05 AM    ASTSGOT 19 06/05/2020 08:05 AM    ALTSGPT 28 06/05/2020 08:05 AM    TBILIRUBIN 0.6 06/05/2020 08:05 AM      No results found for: HBA1C  Lab Results   Component Value Date/Time    TSH 2.140 09/06/2013 08:37 AM    TSH 1.700 02/08/2013 08:05 AM     Lab Results   Component Value Date/Time    FREET4 1.08 10/14/2021 07:47 AM    FREET4 1.01 03/19/2021 09:03 AM       Lab Results   Component Value Date/Time    WBC 5.7 06/05/2020 08:05 AM    WBC 6.7 08/15/2012 11:05 AM    RBC 4.91 06/05/2020 08:05 AM    RBC 4.99 08/15/2012 11:05 AM    HEMOGLOBIN 15.1 06/05/2020 08:05 AM    HEMATOCRIT 45.0 06/05/2020 08:05 AM    MCV 91.6 06/05/2020 08:05 AM    MCV 90 08/15/2012 11:05 AM    MCH 30.8 06/05/2020 08:05 AM    MCH 30.1 08/15/2012 11:05 AM    MCHC 33.6 06/05/2020 08:05 AM    MPV 10.3 06/05/2020 08:05 AM    NEUTSPOLYS 61.20 06/05/2020 08:05 AM    LYMPHOCYTES 28.40 06/05/2020 08:05 AM    MONOCYTES 8.70 06/05/2020 08:05 AM    EOSINOPHILS 0.90 06/05/2020 08:05 AM     BASOPHILS 0.50 06/05/2020 08:05 AM          Assessment/Plan:  1. Hypothyroidism due to Hashimoto's thyroiditis  - TSH; Future  - T3 FREE; Future  - FREE THYROXINE; Future  Chronic condition, stable. Repeat labs in 6 months. Continue armour thyroid 60 mg daily. History of thyroid nodules, last US 06/23/21- they have been stable, will be due for US again 2023 to complete 5 year follow up on these nodules.   2. Hyponatremia  Chronic condition, will continue to monitor. Most like genetic component, father, and daughter both have as well. Has been worked up by Endo in past. BP meds probably contributing. Essentially stable.     4. Screening for deficiency anemia  - CBC WITHOUT DIFFERENTIAL; Future    5. Screening for cardiovascular condition  - Comp Metabolic Panel; Future  - Lipid Profile; Future       Follow-up: Return in about 6 months (around 4/21/2022) for Follow up on labs.    Please note that this dictation was created using voice recognition software. I have made every reasonable attempt to correct obvious errors, but I expect that there are errors of grammar and possibly content that I did not discover before finalizing the note.

## 2021-10-21 NOTE — ASSESSMENT & PLAN NOTE
This is a  chronic condition.   Last TSH level:   TSH   Date Value Ref Range Status   10/14/2021 1.690 0.380 - 5.330 uIU/mL Final     Comment:     Reference Range:    Pregnant Females, 1st Trimester  0.050-3.700  Pregnant Females, 2nd Trimester  0.310-4.350  Pregnant Females, 3rd Trimester  0.410-5.180       Current Medication: Patient continues [place] Thyroid 60 mg daily.  Side effects to medication:   Denies any chronic fatigue, constipation, dry skin, weight gain, heat/cold intolerance, or hair loss.

## 2021-11-04 ENCOUNTER — PATIENT MESSAGE (OUTPATIENT)
Dept: MEDICAL GROUP | Facility: PHYSICIAN GROUP | Age: 66
End: 2021-11-04

## 2021-11-04 DIAGNOSIS — G89.29 CHRONIC RIGHT SHOULDER PAIN: ICD-10-CM

## 2021-11-04 DIAGNOSIS — M25.511 CHRONIC RIGHT SHOULDER PAIN: ICD-10-CM

## 2021-11-05 NOTE — PROGRESS NOTES
Patient is going to PT for right arm pain, they believe her shoulder might be involved.  Patient would like to proceed with an x-ray.

## 2021-11-10 ENCOUNTER — HOSPITAL ENCOUNTER (OUTPATIENT)
Dept: RADIOLOGY | Facility: MEDICAL CENTER | Age: 66
End: 2021-11-10
Attending: PHYSICIAN ASSISTANT
Payer: MEDICARE

## 2021-11-10 DIAGNOSIS — G89.29 CHRONIC RIGHT SHOULDER PAIN: ICD-10-CM

## 2021-11-10 DIAGNOSIS — M25.511 CHRONIC RIGHT SHOULDER PAIN: ICD-10-CM

## 2021-11-10 DIAGNOSIS — R91.8 OPACITY OF LUNG ON IMAGING STUDY: ICD-10-CM

## 2021-11-10 PROCEDURE — 73030 X-RAY EXAM OF SHOULDER: CPT | Mod: RT

## 2021-11-10 NOTE — PROGRESS NOTES
Abnormal x-ray of the shoulder showed possible opacity in the right lung.  We will get a chest x-ray.

## 2021-11-18 ENCOUNTER — HOSPITAL ENCOUNTER (OUTPATIENT)
Dept: RADIOLOGY | Facility: MEDICAL CENTER | Age: 66
End: 2021-11-18
Attending: PHYSICIAN ASSISTANT
Payer: MEDICARE

## 2021-11-18 DIAGNOSIS — R91.8 OPACITY OF LUNG ON IMAGING STUDY: ICD-10-CM

## 2021-11-18 PROCEDURE — 71046 X-RAY EXAM CHEST 2 VIEWS: CPT

## 2021-11-19 ENCOUNTER — TELEPHONE (OUTPATIENT)
Dept: MEDICAL GROUP | Facility: PHYSICIAN GROUP | Age: 66
End: 2021-11-19

## 2021-11-19 ENCOUNTER — PATIENT MESSAGE (OUTPATIENT)
Dept: MEDICAL GROUP | Facility: PHYSICIAN GROUP | Age: 66
End: 2021-11-19

## 2021-11-19 DIAGNOSIS — R93.89 ABNORMAL CHEST X-RAY: ICD-10-CM

## 2021-11-19 NOTE — TELEPHONE ENCOUNTER
----- Message from Shey Pastrana P.A.-C. sent at 11/19/2021 10:25 AM PST -----  Please call patient about their results.     Results showed: Unfortunately chest x-ray showed indeterminate findings.  They recommended a CT for further evaluation.  If you are agreeable I will go ahead and put in the order.    Thank you,    Shey Pastrana PA-C

## 2021-11-29 RX ORDER — TRIAMTERENE AND HYDROCHLOROTHIAZIDE 37.5; 25 MG/1; MG/1
TABLET ORAL
Qty: 45 TABLET | Refills: 0 | Status: SHIPPED | OUTPATIENT
Start: 2021-11-29 | End: 2022-02-24 | Stop reason: SDUPTHER

## 2021-12-06 ENCOUNTER — TELEPHONE (OUTPATIENT)
Dept: MEDICAL GROUP | Facility: PHYSICIAN GROUP | Age: 66
End: 2021-12-06

## 2021-12-06 ENCOUNTER — HOSPITAL ENCOUNTER (OUTPATIENT)
Dept: RADIOLOGY | Facility: MEDICAL CENTER | Age: 66
End: 2021-12-06
Attending: PHYSICIAN ASSISTANT
Payer: MEDICARE

## 2021-12-06 DIAGNOSIS — R93.89 ABNORMAL CHEST X-RAY: ICD-10-CM

## 2021-12-06 PROCEDURE — 71250 CT THORAX DX C-: CPT | Mod: ME

## 2021-12-06 NOTE — TELEPHONE ENCOUNTER
----- Message from Shey Pastrana P.A.-C. sent at 12/6/2021  1:23 PM PST -----  Please call patient about their results.     Results showed: CT of the chest showed granuloma right lower lobe, no further evaluation necessary.  Probable prominent superior pericardial recess, incidental finding, adenopathy unlikely.  Recommend we watch for now.     If you have any questions or concerns, do not hesitate to contact me or my Medical Assistant. Thank you for your time today.     Respectfully,     Shey Pastrana PA-C

## 2021-12-09 RX ORDER — THYROID 60 MG/1
TABLET ORAL
Qty: 40 TABLET | Refills: 3 | Status: SHIPPED | OUTPATIENT
Start: 2021-12-09 | End: 2022-02-24 | Stop reason: SDUPTHER

## 2021-12-21 ENCOUNTER — OFFICE VISIT (OUTPATIENT)
Dept: MEDICAL GROUP | Facility: PHYSICIAN GROUP | Age: 66
End: 2021-12-21
Payer: MEDICARE

## 2021-12-21 VITALS
OXYGEN SATURATION: 95 % | SYSTOLIC BLOOD PRESSURE: 138 MMHG | WEIGHT: 278 LBS | HEIGHT: 66 IN | TEMPERATURE: 97.9 F | RESPIRATION RATE: 16 BRPM | HEART RATE: 70 BPM | BODY MASS INDEX: 44.68 KG/M2 | DIASTOLIC BLOOD PRESSURE: 88 MMHG

## 2021-12-21 DIAGNOSIS — E06.3 HYPOTHYROIDISM DUE TO HASHIMOTO'S THYROIDITIS: ICD-10-CM

## 2021-12-21 DIAGNOSIS — I10 PRIMARY HYPERTENSION: ICD-10-CM

## 2021-12-21 DIAGNOSIS — M19.011 OSTEOARTHRITIS OF RIGHT SHOULDER, UNSPECIFIED OSTEOARTHRITIS TYPE: ICD-10-CM

## 2021-12-21 DIAGNOSIS — E03.8 HYPOTHYROIDISM DUE TO HASHIMOTO'S THYROIDITIS: ICD-10-CM

## 2021-12-21 PROCEDURE — 99214 OFFICE O/P EST MOD 30 MIN: CPT | Performed by: NURSE PRACTITIONER

## 2021-12-21 ASSESSMENT — FIBROSIS 4 INDEX: FIB4 SCORE: 0.99

## 2021-12-21 NOTE — ASSESSMENT & PLAN NOTE
"Currently taking Triamterene-Hctz 37.5-25 half a tablet daily as directed.   Reports diet is \"overall healthy\".   She is following a low-salt diet.  She is not exercising regularly.  She is not taking baby aspirin daily.   She is not monitoring BP at home.   Denies symptoms low BP: light-headed, tunnel-vision, unusual fatigue, dizziness.  Denies symptoms high BP: pounding headache, visual changes, palpitations, flushed face.   Denies chest pain, shortness of breath, dyspnea on exertion.   Denies medicine side effects: unusual fatigue, slow heartbeat, foot/leg swelling, cough.  "

## 2021-12-21 NOTE — ASSESSMENT & PLAN NOTE
Currently taking [place] Thyroid 60 mg one tablet and a quarter tablet daily as prescribed.   Denies symptoms including mood changes, anxiety/depression, chest pain/pressure, palpitations, fatigue, heat/cold intolerance, polydipsia, polyuria, diarrhea/constipation, abdominal pain, unexpected weight change, skin changes, hair thickening/coarsening/falling out/thinning, brittle nails, or edema.  Due for updated lab work April 2022.     11/17/2020 11:48 3/19/2021 09:03 10/14/2021 07:47   TSH 0.474 1.330 1.690   Free T-4 1.10 1.01 1.08   T3,Free 2.82 2.28 3.90

## 2021-12-21 NOTE — PROGRESS NOTES
"Subjective  Chief Complaint  Establish care to manage her chronic conditions    History of Present Illness  Adelaide Pereira is a 66 y.o. female. This patient is here today to establish care.  Her prior PCP was REED Rodriguez.    Osteoarthritis of right shoulder  New diagnosis. Chronic and ongoing. She has been going to Physical Therapy for her shoulder. She states that it has been helping with some of her pain with her shoulder. She was told that it would be a good idea to be referred to Dr. Garcia at the [facility].    Hypothyroidism  Currently taking [place] Thyroid 60 mg one tablet and a quarter tablet daily as prescribed.   Denies symptoms including mood changes, anxiety/depression, chest pain/pressure, palpitations, fatigue, heat/cold intolerance, polydipsia, polyuria, diarrhea/constipation, abdominal pain, unexpected weight change, skin changes, hair thickening/coarsening/falling out/thinning, brittle nails, or edema.  Due for updated lab work April 2022.     11/17/2020 11:48 3/19/2021 09:03 10/14/2021 07:47   TSH 0.474 1.330 1.690   Free T-4 1.10 1.01 1.08   T3,Free 2.82 2.28 3.90       Hypertension  Currently taking Triamterene-Hctz 37.5-25 half a tablet daily as directed.   Reports diet is \"overall healthy\".   She is following a low-salt diet.  She is not exercising regularly.  She is not taking baby aspirin daily.   She is not monitoring BP at home.   Denies symptoms low BP: light-headed, tunnel-vision, unusual fatigue, dizziness.  Denies symptoms high BP: pounding headache, visual changes, palpitations, flushed face.   Denies chest pain, shortness of breath, dyspnea on exertion.   Denies medicine side effects: unusual fatigue, slow heartbeat, foot/leg swelling, cough.    Past Medical History    Allergies: Nitrofurantoin, Aspirin, Codeine, Fish, Maxepa, Peanut oil, and Tree nuts food allergy  Past Medical History:   Diagnosis Date   • Chickenpox    • [place] measles    • Hyperlipidemia    • Hypothyroidism  "   • Influenza    • Mumps    • Obesity    • Sleep apnea 2011    on CPAP   • thyroid nodules 2000    biopsy Hashimoto's thyroiditis / TPO ab neg     Past Surgical History:   Procedure Laterality Date   • PRIMARY C SECTION       Current Outpatient Medications Ordered in Epic   Medication Sig Dispense Refill   • thyroid (ARMOUR THYROID) 60 MG Tab TAKE ONE FOURTH TO ONE TABLET BY MOUTH FIRST THING IN THE MORNING WITH ONLY WATER FOR 60 MINUTES 40 Tablet 3   • triamterene-hctz (MAXZIDE-25/DYAZIDE) 37.5-25 MG Tab TAKE 1/2 TABLET BY MOUTH DAILY 45 Tablet 0   • EPINEPHrine (EPIPEN) 0.3 MG/0.3ML Solution Auto-injector solution for injection INJECT INTO THIGH UTD 1 Each 0   • Coenzyme Q10 (CO Q 10 PO) Take  by mouth.     • Fexofenadine HCl (ALLEGRA ALLERGY PO) Take  by mouth.     • Fluticasone Propionate (FLONASE NA) Spray  in nose.     • ELDERBERRY PO Take  by mouth.     • vitamin D, Ergocalciferol, (DRISDOL) 1.25 MG (02577 UT) Cap capsule Take  by mouth every 7 days.     • Flaxseed, Linseed, (FLAX SEED OIL PO) Take  by mouth.     • Probiotic Product (PROBIOTIC-10 PO) Take  by mouth.       No current Epic-ordered facility-administered medications on file.     Family History:    Family History   Problem Relation Age of Onset   • Thyroid Mother    • Cancer Mother         breast   • Heart Attack Father    • Sleep Apnea Father    • Arterial Aneurysm Father    • Sleep Apnea Brother    • Cancer Maternal Aunt         breast   • No Known Problems Sister    • Diabetes Maternal Grandmother    • No Known Problems Son    • Thyroid Daughter       Personal/Social History:    Social History     Tobacco Use   • Smoking status: Never Smoker   • Smokeless tobacco: Never Used   Vaping Use   • Vaping Use: Never used   Substance Use Topics   • Alcohol use: Yes     Comment: rare   • Drug use: No     Social History     Social History Narrative   • Not on file      Review of Systems:     General: Negative for fever/chills and unexpected weight  "change.    Eyes:  Negative for vision changes, eye pain.   Respiratory:  Negative for cough and dyspnea.     Cardiovascular:  Negative for chest pain and palpitations.   Musculoskeletal: Positive for shoulder pain.    Skin:  Negative for rash.     Objective  Physical Exam:   /88   Pulse 70   Temp 36.6 °C (97.9 °F)   Resp 16   Ht 1.676 m (5' 6\")   Wt (!) 126 kg (278 lb)   SpO2 95%  Body mass index is 44.87 kg/m².  General:  Alert and oriented.  Well appearing.  NAD.  Head:  Normocephalic.   Neck: Supple without JVD. No lymphadenopathy.  Pulmonary:  Normal effort.  Clear to ausculation without rales, ronchi, or wheezing.  Cardiovascular:  Regular rate and rhythm without murmur, rubs or gallop.  Radial pulses are intact and equal bilaterally.  Musculoskeletal:  No extremity cyanosis, clubbing, or edema.  Skin:  Warm and dry.  No obvious lesions.  Psych: Normal mood and affect. Alert and oriented x3. Judgment and insight is normal.      Assessment/Plan   1. Osteoarthritis of right shoulder, unspecified osteoarthritis type  Chronic and ongoing.  Currently in physical therapy for her shoulder. She is requesting a referral to the [facility], Dr. Garcia. Referral placed at this time.  - Referral to Orthopedics    2. Hypothyroidism due to Hashimoto's thyroiditis  Chronic and ongoing.  Continue to take [place] Thyroid as prescribed.  Due for updated labs April 2022.    3. Primary hypertension  Chronic and ongoing.  Continue to take Triamterene-hctz 37.5-25 mg half tablet daily.  Educated on a healthy diet and exercise.  Due for updated labs April 2022.      Health Maintenance: Completed    Return if symptoms worsen or fail to improve.    Please note that this dictation was created using voice recognition software. I have made every reasonable attempt to correct obvious errors, but I expect that there are errors of grammar and possibly content that I did not discover before finalizing the note.    JIM Grace  [facility]

## 2021-12-21 NOTE — ASSESSMENT & PLAN NOTE
New diagnosis. Chronic and ongoing. She has been going to Physical Therapy for her shoulder. She states that it has been helping with some of her pain with her shoulder. She was told that it would be a good idea to be referred to Dr. Garcia at the [facility].

## 2022-01-13 ENCOUNTER — PATIENT MESSAGE (OUTPATIENT)
Dept: HEALTH INFORMATION MANAGEMENT | Facility: OTHER | Age: 67
End: 2022-01-13
Payer: MEDICARE

## 2022-02-24 RX ORDER — TRIAMTERENE AND HYDROCHLOROTHIAZIDE 37.5; 25 MG/1; MG/1
TABLET ORAL
Qty: 45 TABLET | Refills: 0 | Status: SHIPPED | OUTPATIENT
Start: 2022-02-24 | End: 2022-05-03 | Stop reason: SDUPTHER

## 2022-02-24 RX ORDER — THYROID 60 MG/1
TABLET ORAL
Qty: 40 TABLET | Refills: 1 | Status: SHIPPED | OUTPATIENT
Start: 2022-02-24 | End: 2022-05-03 | Stop reason: SDUPTHER

## 2022-04-19 ENCOUNTER — TELEPHONE (OUTPATIENT)
Dept: HEALTH INFORMATION MANAGEMENT | Facility: OTHER | Age: 67
End: 2022-04-19

## 2022-04-21 ENCOUNTER — HOSPITAL ENCOUNTER (OUTPATIENT)
Dept: LAB | Facility: MEDICAL CENTER | Age: 67
End: 2022-04-21
Attending: NURSE PRACTITIONER
Payer: MEDICARE

## 2022-04-21 DIAGNOSIS — Z13.6 SCREENING FOR CARDIOVASCULAR CONDITION: ICD-10-CM

## 2022-04-21 DIAGNOSIS — Z13.0 SCREENING FOR DEFICIENCY ANEMIA: ICD-10-CM

## 2022-04-21 DIAGNOSIS — E06.3 HYPOTHYROIDISM DUE TO HASHIMOTO'S THYROIDITIS: ICD-10-CM

## 2022-04-21 DIAGNOSIS — E03.8 HYPOTHYROIDISM DUE TO HASHIMOTO'S THYROIDITIS: ICD-10-CM

## 2022-04-21 LAB
ALBUMIN SERPL BCP-MCNC: 4.3 G/DL (ref 3.2–4.9)
ALBUMIN/GLOB SERPL: 1.6 G/DL
ALP SERPL-CCNC: 110 U/L (ref 30–99)
ALT SERPL-CCNC: 25 U/L (ref 2–50)
ANION GAP SERPL CALC-SCNC: 10 MMOL/L (ref 7–16)
AST SERPL-CCNC: 21 U/L (ref 12–45)
BILIRUB SERPL-MCNC: 0.7 MG/DL (ref 0.1–1.5)
BUN SERPL-MCNC: 11 MG/DL (ref 8–22)
CALCIUM SERPL-MCNC: 9.5 MG/DL (ref 8.5–10.5)
CHLORIDE SERPL-SCNC: 97 MMOL/L (ref 96–112)
CHOLEST SERPL-MCNC: 181 MG/DL (ref 100–199)
CO2 SERPL-SCNC: 25 MMOL/L (ref 20–33)
CREAT SERPL-MCNC: 0.43 MG/DL (ref 0.5–1.4)
ERYTHROCYTE [DISTWIDTH] IN BLOOD BY AUTOMATED COUNT: 48.6 FL (ref 35.9–50)
FASTING STATUS PATIENT QL REPORTED: NORMAL
GFR SERPLBLD CREATININE-BSD FMLA CKD-EPI: 105 ML/MIN/1.73 M 2
GLOBULIN SER CALC-MCNC: 2.7 G/DL (ref 1.9–3.5)
GLUCOSE SERPL-MCNC: 85 MG/DL (ref 65–99)
HCT VFR BLD AUTO: 45.2 % (ref 37–47)
HDLC SERPL-MCNC: 76 MG/DL
HGB BLD-MCNC: 14.8 G/DL (ref 12–16)
LDLC SERPL CALC-MCNC: 92 MG/DL
MCH RBC QN AUTO: 30.5 PG (ref 27–33)
MCHC RBC AUTO-ENTMCNC: 32.7 G/DL (ref 33.6–35)
MCV RBC AUTO: 93.2 FL (ref 81.4–97.8)
PLATELET # BLD AUTO: 250 K/UL (ref 164–446)
PMV BLD AUTO: 10.4 FL (ref 9–12.9)
POTASSIUM SERPL-SCNC: 4.7 MMOL/L (ref 3.6–5.5)
PROT SERPL-MCNC: 7 G/DL (ref 6–8.2)
RBC # BLD AUTO: 4.85 M/UL (ref 4.2–5.4)
SODIUM SERPL-SCNC: 132 MMOL/L (ref 135–145)
T3FREE SERPL-MCNC: 3.81 PG/ML (ref 2–4.4)
T4 FREE SERPL-MCNC: 1.13 NG/DL (ref 0.93–1.7)
TRIGL SERPL-MCNC: 63 MG/DL (ref 0–149)
TSH SERPL DL<=0.005 MIU/L-ACNC: 2.57 UIU/ML (ref 0.38–5.33)
WBC # BLD AUTO: 5.8 K/UL (ref 4.8–10.8)

## 2022-04-21 PROCEDURE — 85027 COMPLETE CBC AUTOMATED: CPT

## 2022-04-21 PROCEDURE — 80053 COMPREHEN METABOLIC PANEL: CPT

## 2022-04-21 PROCEDURE — 84481 FREE ASSAY (FT-3): CPT

## 2022-04-21 PROCEDURE — 80061 LIPID PANEL: CPT

## 2022-04-21 PROCEDURE — 84443 ASSAY THYROID STIM HORMONE: CPT

## 2022-04-21 PROCEDURE — 36415 COLL VENOUS BLD VENIPUNCTURE: CPT

## 2022-04-21 PROCEDURE — 84439 ASSAY OF FREE THYROXINE: CPT

## 2022-05-03 ENCOUNTER — OFFICE VISIT (OUTPATIENT)
Dept: MEDICAL GROUP | Facility: PHYSICIAN GROUP | Age: 67
End: 2022-05-03
Payer: MEDICARE

## 2022-05-03 VITALS
DIASTOLIC BLOOD PRESSURE: 80 MMHG | HEART RATE: 70 BPM | RESPIRATION RATE: 20 BRPM | TEMPERATURE: 97.1 F | WEIGHT: 271.06 LBS | SYSTOLIC BLOOD PRESSURE: 130 MMHG | HEIGHT: 66 IN | BODY MASS INDEX: 43.56 KG/M2 | OXYGEN SATURATION: 98 %

## 2022-05-03 DIAGNOSIS — I10 PRIMARY HYPERTENSION: ICD-10-CM

## 2022-05-03 DIAGNOSIS — E03.8 HYPOTHYROIDISM DUE TO HASHIMOTO'S THYROIDITIS: ICD-10-CM

## 2022-05-03 DIAGNOSIS — E06.3 HYPOTHYROIDISM DUE TO HASHIMOTO'S THYROIDITIS: ICD-10-CM

## 2022-05-03 PROCEDURE — 99214 OFFICE O/P EST MOD 30 MIN: CPT | Performed by: NURSE PRACTITIONER

## 2022-05-03 RX ORDER — TRIAMTERENE AND HYDROCHLOROTHIAZIDE 37.5; 25 MG/1; MG/1
TABLET ORAL
Qty: 45 TABLET | Refills: 2 | Status: SHIPPED | OUTPATIENT
Start: 2022-05-03 | End: 2023-02-22

## 2022-05-03 RX ORDER — THYROID 60 MG/1
TABLET ORAL
Qty: 40 TABLET | Refills: 1 | Status: SHIPPED | OUTPATIENT
Start: 2022-05-03 | End: 2022-09-07 | Stop reason: SDUPTHER

## 2022-05-03 ASSESSMENT — FIBROSIS 4 INDEX: FIB4 SCORE: 1.14

## 2022-05-03 ASSESSMENT — PATIENT HEALTH QUESTIONNAIRE - PHQ9: CLINICAL INTERPRETATION OF PHQ2 SCORE: 0

## 2022-05-03 NOTE — PROGRESS NOTES
"Subjective  Chief Complaint  Lab Results    History of Present Illness  Adelaide Pereira is a 66 y.o. female. This established patient is here today to go over lab results.    Hypertension  Currently taking Triamterene-Hctz 37.5-25 half a tablet daily as directed.   Reports diet is \"overall healthy\".   She is following a low-salt diet.  She is not exercising regularly.  She is not taking baby aspirin daily.   She is not monitoring BP at home.   Denies symptoms low BP: light-headed, tunnel-vision, unusual fatigue, dizziness.  Denies symptoms high BP: pounding headache, visual changes, palpitations, flushed face.   Denies chest pain, shortness of breath, dyspnea on exertion.   Denies medicine side effects: unusual fatigue, slow heartbeat, foot/leg swelling, cough.    Hypothyroidism  Currently taking [place] Thyroid 60 mg one tablet and a quarter tablet daily as prescribed.   Denies symptoms including mood changes, anxiety/depression, chest pain/pressure, palpitations, fatigue, heat/cold intolerance, polydipsia, polyuria, diarrhea/constipation, abdominal pain, unexpected weight change, skin changes, hair thickening/coarsening/falling out/thinning, brittle nails, or edema.       3/19/2021 09:03 10/14/2021 07:47 4/21/2022 08:09   TSH 1.330 1.690 2.570   Free T-4 1.01 1.08 1.13   T3,Free 2.28 3.90 3.81       Annual Health Assessment Questions:    1.  Are you currently engaging in any exercise or physical activity? Yes    2.  How would you describe your mood or emotional well-being today? good    3.  Have you had any falls in the last year? No    4.  Have you noticed any problems with your balance or had difficulty walking? No    5.  In the last six months have you experienced any leakage of urine? No    6. DPA/Advanced Directive: Patient has Advanced Directive, but it is not on file. Instructed to bring in a copy to scan into their chart.      Past Medical History    Allergies: Nitrofurantoin, Aspirin, Codeine, Fish, " Maxepa, Peanut oil, and Tree nuts food allergy  Past Medical History:   Diagnosis Date   • Chickenpox    • [place] measles    • Hyperlipidemia    • Hypothyroidism    • Influenza    • Mumps    • Obesity    • Sleep apnea 2011    on CPAP   • thyroid nodules 2000    biopsy Hashimoto's thyroiditis / TPO ab neg     Past Surgical History:   Procedure Laterality Date   • PRIMARY C SECTION       Current Outpatient Medications Ordered in Epic   Medication Sig Dispense Refill   • Multiple Vitamins-Minerals (PRESERVISION AREDS PO) Take  by mouth.     • triamterene-hctz (MAXZIDE-25/DYAZIDE) 37.5-25 MG Tab TAKE 1/2 TABLET BY MOUTH DAILY 45 Tablet 2   • thyroid (ARMOUR THYROID) 60 MG Tab TAKE  1.25  Tab BY MOUTH FIRST THING IN THE MORNING 40 Tablet 1   • EPINEPHrine (EPIPEN) 0.3 MG/0.3ML Solution Auto-injector solution for injection INJECT INTO THIGH UTD 1 Each 0   • Coenzyme Q10 (CO Q 10 PO) Take  by mouth.     • Fexofenadine HCl (ALLEGRA ALLERGY PO) Take  by mouth.     • Fluticasone Propionate (FLONASE NA) Spray  in nose.     • ELDERBERRY PO Take  by mouth.     • vitamin D, Ergocalciferol, (DRISDOL) 1.25 MG (42668 UT) Cap capsule Take  by mouth every 7 days.     • Flaxseed, Linseed, (FLAX SEED OIL PO) Take  by mouth.     • Probiotic Product (PROBIOTIC-10 PO) Take  by mouth.       No current Epic-ordered facility-administered medications on file.     Family History:    Family History   Problem Relation Age of Onset   • Thyroid Mother    • Cancer Mother         breast   • Heart Attack Father    • Sleep Apnea Father    • Arterial Aneurysm Father    • Sleep Apnea Brother    • Cancer Maternal Aunt         breast   • No Known Problems Sister    • Diabetes Maternal Grandmother    • No Known Problems Son    • Thyroid Daughter       Personal/Social History:    Social History     Tobacco Use   • Smoking status: Never Smoker   • Smokeless tobacco: Never Used   Vaping Use   • Vaping Use: Never used   Substance Use Topics   • Alcohol use: Yes  "    Comment: Occasionally    • Drug use: No     Social History     Social History Narrative   • Not on file      Review of Systems:   General: Negative for fever/chills and unexpected weight change.   Eyes:  Negative for vision changes, eye pain.  Respiratory:  Negative for cough and dyspnea.    Cardiovascular:  Negative for chest pain and palpitations.  Musculoskeletal:  Negative for myalgias.   Skin:  Negative for rash.   Neurological:  Negative for numbness/tingling and headaches.     Objective  Physical Exam:   /80 (BP Location: Left arm, Patient Position: Sitting, BP Cuff Size: Large adult)   Pulse 70   Temp 36.2 °C (97.1 °F) (Temporal)   Resp 20   Ht 1.676 m (5' 6\")   Wt 123 kg (271 lb 1 oz)   SpO2 98%  Body mass index is 43.75 kg/m².  General:  Alert and oriented.  Well appearing.  NAD  Neck: Supple without JVD. No lymphadenopathy.  Pulmonary:  Normal effort.  Clear to ausculation without rales, ronchi, or wheezing.  Cardiovascular:  Regular rate and rhythm without murmur, rubs or gallop.   Skin:  Warm and dry.  No obvious lesions.  Musculoskeletal:  No extremity cyanosis, clubbing, or edema.      Diagnostic Testing/Findings:  Labs:    4/21/2022 08:09   WBC 5.8   RBC 4.85   Hemoglobin 14.8   Hematocrit 45.2   MCV 93.2   MCH 30.5   MCHC 32.7 (L)   RDW 48.6   Platelet Count 250   MPV 10.4   Sodium 132 (L)   Potassium 4.7   Chloride 97   Co2 25   Anion Gap 10.0   Glucose 85   Bun 11   Creatinine 0.43 (L)   GFR (CKD-EPI) 105   Calcium 9.5   AST(SGOT) 21   ALT(SGPT) 25   Alkaline Phosphatase 110 (H)   Total Bilirubin 0.7   Albumin 4.3   Total Protein 7.0   Globulin 2.7   A-G Ratio 1.6   Fasting Status Fasting   Cholesterol,Tot 181   Triglycerides 63   HDL 76   LDL 92   TSH 2.570   Free T-4 1.13   T3,Free 3.81       Assessment/Plan  1. Primary hypertension  Chronic and ongoing.  Continue to take Triamterene-hctz 37.5-25 mg half tablet daily.  Educated on a healthy diet and exercise.  - triamterene-hctz " (MAXZIDE-25/DYAZIDE) 37.5-25 MG Tab; TAKE 1/2 TABLET BY MOUTH DAILY  Dispense: 45 Tablet; Refill: 2    2. Hypothyroidism due to Hashimoto's thyroiditis  Chronic and ongoing.  Continue to take Thyroid 60 mg 1.25 tablet every morning.  - thyroid (ARMOUR THYROID) 60 MG Tab; TAKE  1.25  Tab BY MOUTH FIRST THING IN THE MORNING  Dispense: 40 Tablet; Refill: 1      Health Maintenance: Completed    Return in about 6 months (around 11/3/2022), or if symptoms worsen or fail to improve, for F/U.    Please note that this dictation was created using voice recognition software. I have made every reasonable attempt to correct obvious errors, but I expect that there are errors of grammar and possibly content that I did not discover before finalizing the note.    JIM Grace  [facility]

## 2022-05-03 NOTE — ASSESSMENT & PLAN NOTE
Currently taking [place] Thyroid 60 mg one tablet and a quarter tablet daily as prescribed.   Denies symptoms including mood changes, anxiety/depression, chest pain/pressure, palpitations, fatigue, heat/cold intolerance, polydipsia, polyuria, diarrhea/constipation, abdominal pain, unexpected weight change, skin changes, hair thickening/coarsening/falling out/thinning, brittle nails, or edema.       3/19/2021 09:03 10/14/2021 07:47 4/21/2022 08:09   TSH 1.330 1.690 2.570   Free T-4 1.01 1.08 1.13   T3,Free 2.28 3.90 3.81

## 2022-05-31 ENCOUNTER — APPOINTMENT (RX ONLY)
Dept: URBAN - METROPOLITAN AREA CLINIC 22 | Facility: CLINIC | Age: 67
Setting detail: DERMATOLOGY
End: 2022-05-31

## 2022-05-31 DIAGNOSIS — L81.4 OTHER MELANIN HYPERPIGMENTATION: ICD-10-CM

## 2022-05-31 DIAGNOSIS — D22 MELANOCYTIC NEVI: ICD-10-CM

## 2022-05-31 DIAGNOSIS — L82.1 OTHER SEBORRHEIC KERATOSIS: ICD-10-CM

## 2022-05-31 DIAGNOSIS — Z71.89 OTHER SPECIFIED COUNSELING: ICD-10-CM

## 2022-05-31 DIAGNOSIS — Z11.59 NEED FOR HEPATITIS C SCREENING TEST: ICD-10-CM

## 2022-05-31 DIAGNOSIS — D18.0 HEMANGIOMA: ICD-10-CM

## 2022-05-31 DIAGNOSIS — L57.0 ACTINIC KERATOSIS: ICD-10-CM

## 2022-05-31 PROBLEM — D18.01 HEMANGIOMA OF SKIN AND SUBCUTANEOUS TISSUE: Status: ACTIVE | Noted: 2022-05-31

## 2022-05-31 PROBLEM — D22.5 MELANOCYTIC NEVI OF TRUNK: Status: ACTIVE | Noted: 2022-05-31

## 2022-05-31 PROCEDURE — 99213 OFFICE O/P EST LOW 20 MIN: CPT | Mod: 25

## 2022-05-31 PROCEDURE — 17000 DESTRUCT PREMALG LESION: CPT

## 2022-05-31 PROCEDURE — ? SUNSCREEN TREATMENT REGIMEN

## 2022-05-31 PROCEDURE — ? LIQUID NITROGEN

## 2022-05-31 PROCEDURE — ? COUNSELING

## 2022-05-31 ASSESSMENT — LOCATION SIMPLE DESCRIPTION DERM
LOCATION SIMPLE: LEFT CHEEK
LOCATION SIMPLE: LEFT FOREARM
LOCATION SIMPLE: INFERIOR FOREHEAD
LOCATION SIMPLE: RIGHT SCALP
LOCATION SIMPLE: UPPER BACK
LOCATION SIMPLE: RIGHT FOREARM
LOCATION SIMPLE: ABDOMEN
LOCATION SIMPLE: SCALP

## 2022-05-31 ASSESSMENT — LOCATION ZONE DERM
LOCATION ZONE: FACE
LOCATION ZONE: SCALP
LOCATION ZONE: TRUNK
LOCATION ZONE: ARM

## 2022-05-31 ASSESSMENT — LOCATION DETAILED DESCRIPTION DERM
LOCATION DETAILED: LEFT SUPERIOR CENTRAL MALAR CHEEK
LOCATION DETAILED: PERIUMBILICAL SKIN
LOCATION DETAILED: RIGHT MEDIAL FRONTAL SCALP
LOCATION DETAILED: EPIGASTRIC SKIN
LOCATION DETAILED: RIGHT VENTRAL PROXIMAL FOREARM
LOCATION DETAILED: INFERIOR THORACIC SPINE
LOCATION DETAILED: LEFT VENTRAL PROXIMAL FOREARM
LOCATION DETAILED: INFERIOR MID FOREHEAD
LOCATION DETAILED: RIGHT SUPERIOR PARIETAL SCALP
LOCATION DETAILED: SUPERIOR THORACIC SPINE

## 2022-05-31 NOTE — PROCEDURE: LIQUID NITROGEN
Show Applicator Variable?: Yes
Detail Level: Detailed
Duration Of Freeze Thaw-Cycle (Seconds): 0
Render Post-Care Instructions In Note?: no
Post-Care Instructions: I reviewed with the patient in detail post-care instructions. Patient is to wear sunprotection, and avoid picking at any of the treated lesions. Pt may apply Vaseline to crusted or scabbing areas.
Number Of Freeze-Thaw Cycles: 2 freeze-thaw cycles
Consent: The patient's consent was obtained including but not limited to risks of crusting, scabbing, blistering, scarring, darker or lighter pigmentary change, recurrence, incomplete removal and infection.

## 2022-06-10 NOTE — PROGRESS NOTES
CC: Annual visit for RAISA on CPAP at 14 cm water        HPI:  Adelaide Pereira is a 66 y.o.female  kindly referred by MIGUEL Martinez last seen by JIM Bauman on 6/14/2021 when compliance was excellent and AHI normalized at 0.1.    Her sleep study performed 10/17/2011 showed an AHI of 36.9, a REM AHI of 70, and a regulo saturation of 62%.    Today, 6/13/2022, her 30-day compliance is 100% for 8 hours and 44 minutes.  On CPAP at 14 cm water she has an average residual estimated apnea-hypopnea index is 0.1.  She does experience intermittent leaking.    The patient reports improved symptoms using positive airway pressure.  Has experienced no significant problems with mask fit, mask leak, pressure tolerance, excessive airway dryness, nasal congestion, aerophagia, or chest pain.    Significant comorbidities and modifying factors include non-smoker, hypertension, hypothyroidism, morbid obesity, and immunization care gaps.    Patient Active Problem List    Diagnosis Date Noted   • Osteoarthritis of right shoulder 12/21/2021   • Food allergy 07/08/2020   • Multiple thyroid nodules 07/08/2020   • Hyponatremia 07/07/2020   • Binge eating 06/22/2020   • Non-smoker 06/10/2019   • Morbid obesity with BMI of 45.0-49.9, adult ([facility]) 06/28/2018   • Chronic thyroiditis 02/12/2013   • Vitamin D deficiency 02/16/2012   • Sleep apnea    • Hypertension 02/22/2010   • Hypothyroidism 02/22/2010   • Goiter        Past Medical History:   Diagnosis Date   • Chickenpox    • [place] measles    • Hyperlipidemia    • Hypothyroidism    • Influenza    • Mumps    • Obesity    • Sleep apnea 2011    on CPAP   • thyroid nodules 2000    biopsy Hashimoto's thyroiditis / TPO ab neg        Past Surgical History:   Procedure Laterality Date   • PRIMARY C SECTION         Family History   Problem Relation Age of Onset   • Thyroid Mother    • Cancer Mother         breast   • Heart Attack Father    • Sleep Apnea Father    • Arterial Aneurysm  Father    • Sleep Apnea Brother    • Cancer Maternal Aunt         breast   • No Known Problems Sister    • Diabetes Maternal Grandmother    • No Known Problems Son    • Thyroid Daughter        Social History     Socioeconomic History   • Marital status:      Spouse name: Not on file   • Number of children: Not on file   • Years of education: Not on file   • Highest education level: Not on file   Occupational History   • Not on file   Tobacco Use   • Smoking status: Never Smoker   • Smokeless tobacco: Never Used   Vaping Use   • Vaping Use: Never used   Substance and Sexual Activity   • Alcohol use: Yes     Comment: Occasionally    • Drug use: No   • Sexual activity: Not Currently   Other Topics Concern   • Not on file   Social History Narrative   • Not on file     Social Determinants of Health     Financial Resource Strain: Not on file   Food Insecurity: Not on file   Transportation Needs: Not on file   Physical Activity: Not on file   Stress: Not on file   Social Connections: Not on file   Intimate Partner Violence: Not on file   Housing Stability: Not on file       Current Outpatient Medications   Medication Sig Dispense Refill   • Multiple Vitamins-Minerals (PRESERVISION AREDS PO) Take  by mouth.     • triamterene-hctz (MAXZIDE-25/DYAZIDE) 37.5-25 MG Tab TAKE 1/2 TABLET BY MOUTH DAILY 45 Tablet 2   • thyroid (ARMOUR THYROID) 60 MG Tab TAKE  1.25  Tab BY MOUTH FIRST THING IN THE MORNING 40 Tablet 1   • EPINEPHrine (EPIPEN) 0.3 MG/0.3ML Solution Auto-injector solution for injection INJECT INTO THIGH UTD 1 Each 0   • Coenzyme Q10 (CO Q 10 PO) Take  by mouth.     • Fexofenadine HCl (ALLEGRA ALLERGY PO) Take  by mouth.     • ELDERBERRY PO Take  by mouth.     • vitamin D, Ergocalciferol, (DRISDOL) 1.25 MG (83207 UT) Cap capsule Take  by mouth every 7 days.     • Flaxseed, Linseed, (FLAX SEED OIL PO) Take  by mouth.     • Probiotic Product (PROBIOTIC-10 PO) Take  by mouth.     • Fluticasone Propionate (FLONASE  "NA) Spray  in nose.       No current facility-administered medications for this visit.    \"CURRENT RX\"    ALLERGIES: Nitrofurantoin, Aspirin, Codeine, Fish, Maxepa, Peanut oil, and Tree nuts food allergy    ROS    Constitutional: Denies fever, chills, sweats,  weight loss, fatigue  Cardiovascular: Denies chest pain, tightness, palpitations, swelling in legs/feet, fainting, difficulty breathing when lying down but gets better when sitting up.   Respiratory: Denies shortness of breath, cough, sputum, wheezing, painful breathing, coughing up blood.   Sleep: per HPI  Gastrointestinal: Denies  difficulty swallowing, nausea, abdominal pain, diarrhea, constipation, heartburn.  Musculoskeletal: Denies painful joints, sore muscles, back pain.        PHYSICAL EXAM  Obese    /84 (BP Location: Left arm, Patient Position: Sitting, BP Cuff Size: Large adult)   Pulse 86   Temp 36 °C (96.8 °F) (Temporal)   Ht 1.676 m (5' 6\")   Wt 122 kg (268 lb)   SpO2 96%   BMI 43.26 kg/m²      Appearance: Well-nourished, well-developed, no acute distress  Eyes:  PERRLA, EOMI  ENMT: masked  Neck: Supple, trachea midline, no masses  Respiratory effort:  No intercostal retractions or use of accessory muscles  Lung auscultation:  No wheezes rhonchi rubs or rales  Cardiac: No murmurs, rubs, or gallops; regular rhythm, normal rate; no edema  Abdomen:  No tenderness, no organomegaly. Obese.   Attended study with the  Musculoskeletal:  Grossly normal; gait and station normal; digits and nails normal  Skin:  No rashes, petechiae, cyanosis  Neurologic: without focal signs; oriented to person, time, place, and purpose; judgement intact  Psychiatric:  No depression, anxiety, agitation      Medical Decision Making       The medical record was reviewed in its entirety including the referral notes, records from primary care, consultants notes, hospital records, lab, imaging, microbiology, immunology, and immunizations.  Care gaps identified and " reviewed with the patient.    Diagnostic and titration nocturnal polysomnogram's, home sleep apnea tests, continuous nocturnal oximetry results, multiple sleep latency tests, and compliance reports reviewed.  1. RAISA (obstructive sleep apnea)      PLAN:   Has been advised to continue the current  CPAP 14 cm water, clean equipment frequently, and get new mask and supplies as allowed by insurance and DME. Advised about potential problems including nasal obstruction/stuffiness, mask leak or discomfort , frequent awakenings, chill or dryness of the upper airway, noise, inconvenience, and lack of benefit. Recommend an earlier appointment, if significant treatment barriers develop.    The risks of untreated sleep apnea were discussed with the patient at length. Patients with RAISA are at increased risk of cardiovascular disease including coronary artery disease, systemic arterial hypertension, pulmonary arterial hypertension, cardiac arrythmias, and stroke. RAISA patients have an increased risk of motor vehicle accidents, type 2 diabetes, chronic kidney disease, and non-alcoholic liver disease. The patient was advised to avoid driving a motor vehicle when drowsy.    Positive airway pressure will favorably impact many of the adverse conditions and effects provoked by RAISA.    Have advised the patient to follow up with the appropriate healthcare practitioners for all other medical problems and issues.    N95 mask wearing, handwashing, and social distancing protocols reviewed and encouraged.    Return in about 1 year (around 6/13/2023).    Total time 30 minutes

## 2022-06-13 ENCOUNTER — OFFICE VISIT (OUTPATIENT)
Dept: SLEEP MEDICINE | Facility: MEDICAL CENTER | Age: 67
End: 2022-06-13
Payer: MEDICARE

## 2022-06-13 VITALS
DIASTOLIC BLOOD PRESSURE: 84 MMHG | HEART RATE: 86 BPM | HEIGHT: 66 IN | TEMPERATURE: 96.8 F | OXYGEN SATURATION: 96 % | BODY MASS INDEX: 43.07 KG/M2 | WEIGHT: 268 LBS | SYSTOLIC BLOOD PRESSURE: 136 MMHG

## 2022-06-13 DIAGNOSIS — G47.33 OSA (OBSTRUCTIVE SLEEP APNEA): ICD-10-CM

## 2022-06-13 PROCEDURE — 99214 OFFICE O/P EST MOD 30 MIN: CPT | Performed by: INTERNAL MEDICINE

## 2022-06-13 ASSESSMENT — FIBROSIS 4 INDEX: FIB4 SCORE: 1.14

## 2022-06-14 DIAGNOSIS — E55.9 VITAMIN D DEFICIENCY: ICD-10-CM

## 2022-06-14 DIAGNOSIS — E03.8 HYPOTHYROIDISM DUE TO HASHIMOTO'S THYROIDITIS: ICD-10-CM

## 2022-06-14 DIAGNOSIS — I10 PRIMARY HYPERTENSION: ICD-10-CM

## 2022-06-14 DIAGNOSIS — Z13.6 ENCOUNTER FOR LIPID SCREENING FOR CARDIOVASCULAR DISEASE: ICD-10-CM

## 2022-06-14 DIAGNOSIS — Z13.0 SCREENING FOR DEFICIENCY ANEMIA: ICD-10-CM

## 2022-06-14 DIAGNOSIS — Z13.220 ENCOUNTER FOR LIPID SCREENING FOR CARDIOVASCULAR DISEASE: ICD-10-CM

## 2022-06-14 DIAGNOSIS — E06.3 HYPOTHYROIDISM DUE TO HASHIMOTO'S THYROIDITIS: ICD-10-CM

## 2022-06-14 NOTE — PROGRESS NOTES
1. Primary hypertension  - Comp Metabolic Panel; Future    2. Hypothyroidism due to Hashimoto's thyroiditis  - TSH; Future  - FREE THYROXINE; Future  - TRIIDOTHYRONINE; Future    3. Vitamin D deficiency  - VITAMIN D,25 HYDROXY; Future    4. Encounter for lipid screening for cardiovascular disease  - Lipid Profile; Future    5. Screening for deficiency anemia  - CBC WITH DIFFERENTIAL; Future

## 2022-07-03 ENCOUNTER — OFFICE VISIT (OUTPATIENT)
Dept: URGENT CARE | Facility: PHYSICIAN GROUP | Age: 67
End: 2022-07-03
Payer: MEDICARE

## 2022-07-03 VITALS
HEIGHT: 66 IN | RESPIRATION RATE: 16 BRPM | OXYGEN SATURATION: 95 % | BODY MASS INDEX: 43.71 KG/M2 | TEMPERATURE: 97.8 F | WEIGHT: 272 LBS | DIASTOLIC BLOOD PRESSURE: 80 MMHG | HEART RATE: 74 BPM | SYSTOLIC BLOOD PRESSURE: 135 MMHG

## 2022-07-03 DIAGNOSIS — J01.00 ACUTE NON-RECURRENT MAXILLARY SINUSITIS: ICD-10-CM

## 2022-07-03 PROCEDURE — 99214 OFFICE O/P EST MOD 30 MIN: CPT | Performed by: FAMILY MEDICINE

## 2022-07-03 RX ORDER — AMOXICILLIN 875 MG/1
875 TABLET, COATED ORAL 2 TIMES DAILY
Qty: 14 TABLET | Refills: 0 | Status: SHIPPED | OUTPATIENT
Start: 2022-07-03 | End: 2022-07-10

## 2022-07-03 RX ORDER — FLUTICASONE PROPIONATE 50 MCG
1 SPRAY, SUSPENSION (ML) NASAL 2 TIMES DAILY
Qty: 16 G | Refills: 0 | Status: SHIPPED | OUTPATIENT
Start: 2022-07-03 | End: 2022-07-10

## 2022-07-03 ASSESSMENT — FIBROSIS 4 INDEX: FIB4 SCORE: 1.14

## 2022-07-03 NOTE — PROGRESS NOTES
Chief Complaint   Patient presents with   • Sinusitis                               Cough  This is a new problem. The current episode started 14 days ago. The problem has been gradually worsening. The problem occurs constantly. The cough is productive of yellow sputum. Associated symptoms include : headaches, fatigue, nasal congestion.   + fevers at home.    States cough is making it hard to sleep at night.   Pertinent negatives include no   nausea, vomiting, diarrhea, sweats, weight loss or wheezing. Nothing aggravates the symptoms.  Patient has tried several OTC cold products for the symptoms - no improvement. There is no history of asthma.        Past Medical History:   Diagnosis Date   • Chickenpox    • [place] measles    • Hyperlipidemia    • Hypothyroidism    • Influenza    • Mumps    • Obesity    • Sleep apnea 2011    on CPAP   • thyroid nodules 2000    biopsy Hashimoto's thyroiditis / TPO ab neg         Social History     Tobacco Use   • Smoking status: Never Smoker   • Smokeless tobacco: Never Used   Vaping Use   • Vaping Use: Never used   Substance Use Topics   • Alcohol use: Yes     Comment: Occasionally    • Drug use: No             Review of Systems   Constitutional: +for fever   HENT - denies  ear pain.   Positive congestion, sore throat  Eyes: denies vision changes, discharge  Respiratory: Negative for hemoptysis and wheezing.    Cardiovascular: Negative for chest pain or PND.   Gastrointestinal:  No abdominal pain,  nausea, vomiting, diarrhea.  Negative for  blood in stool.    - no discharge, dysuria, frequency.      Neurological: Negative for dizziness.   + headaches.   musculoskeletal - denies myalgias, calf pain  Psych - denies anxiety/depression/mood changes.  Skin: no itching or rash  All other systems reviewed and are negative.             Objective:     /80 (BP Location: Left arm, Patient Position: Sitting, BP Cuff Size: Large adult)   Pulse 74   Temp 36.6 °C (97.8 °F) (Temporal)    "Resp 16   Ht 1.676 m (5' 6\")   Wt 123 kg (272 lb)   SpO2 95%     Physical Exam   Constitutional: patient is oriented to person, place, and time. Patient appears well-developed and well-nourished. No distress.   HENT:   Head: Normocephalic and atraumatic.   Right Ear: External ear normal.   Left Ear: External ear normal.   TMs both normal  Nose: Mucosal edema  present.   There is tenderness to percussion over left and right sinuses  Mouth/Throat: Mucous membranes are normal. No oral lesions.  No posterior pharyngeal erythema.  No oropharyngeal exudate or posterior oropharyngeal edema.   Eyes: Conjunctivae and EOM are normal. Pupils are equal, round, and reactive to light. Right eye exhibits no discharge. Left eye exhibits no discharge. No scleral icterus.   Neck: Normal range of motion. Neck supple. No tracheal deviation present.   Cardiovascular: Normal rate, regular rhythm and normal heart sounds.  Exam reveals no friction rub.    Pulmonary/Chest: Effort normal. No respiratory distress. Patient has no wheezes or rhonchi. Patient has no rales.    Musculoskeletal:  exhibits no edema.   Lymphadenopathy:     Patient has  cervical adenopathy.      Neurological: patient is alert and oriented to person, place, and time.   CN 2-12 intact  Skin: Skin is warm and dry. No rash noted. No erythema.   Psychiatric: patient  has a normal mood and affect.  behavior is normal.   Nursing note and vitals reviewed.              Assessment/Plan:           1. Acute non-recurrent maxillary sinusitis   with fever  - fluticasone (FLONASE) 50 MCG/ACT nasal spray; Administer 1 Spray into affected nostril(S) 2 times a day for 7 days.  Dispense: 16 g; Refill: 0  - amoxicillin (AMOXIL) 875 MG tablet; Take 1 Tablet by mouth 2 times a day for 7 days.  Dispense: 14 Tablet; Refill: 0        Follow up in one week if no improvement, sooner if symptoms worsen.     "

## 2022-07-06 ENCOUNTER — TELEPHONE (OUTPATIENT)
Dept: SLEEP MEDICINE | Facility: MEDICAL CENTER | Age: 67
End: 2022-07-06
Payer: MEDICARE

## 2022-07-06 NOTE — TELEPHONE ENCOUNTER
Pt LVM stating that [facility] still does not have the order for cpap supplies. LVM for the pt informing her that [facility] to [facility] with all the needed information and I advise her to give us a call if she has any questions or concerns.

## 2022-07-18 ENCOUNTER — HOSPITAL ENCOUNTER (OUTPATIENT)
Dept: RADIOLOGY | Facility: MEDICAL CENTER | Age: 67
End: 2022-07-18
Attending: NURSE PRACTITIONER
Payer: MEDICARE

## 2022-07-18 DIAGNOSIS — Z12.31 VISIT FOR SCREENING MAMMOGRAM: ICD-10-CM

## 2022-07-18 PROCEDURE — 77063 BREAST TOMOSYNTHESIS BI: CPT

## 2022-08-15 ENCOUNTER — OFFICE VISIT (OUTPATIENT)
Dept: URGENT CARE | Facility: PHYSICIAN GROUP | Age: 67
End: 2022-08-15
Payer: MEDICARE

## 2022-08-15 VITALS
RESPIRATION RATE: 18 BRPM | WEIGHT: 270 LBS | HEIGHT: 66 IN | OXYGEN SATURATION: 95 % | SYSTOLIC BLOOD PRESSURE: 124 MMHG | TEMPERATURE: 97.6 F | HEART RATE: 76 BPM | BODY MASS INDEX: 43.39 KG/M2 | DIASTOLIC BLOOD PRESSURE: 82 MMHG

## 2022-08-15 DIAGNOSIS — R21 RASH AND NONSPECIFIC SKIN ERUPTION: ICD-10-CM

## 2022-08-15 PROCEDURE — 99214 OFFICE O/P EST MOD 30 MIN: CPT | Performed by: PHYSICIAN ASSISTANT

## 2022-08-15 RX ORDER — METHYLPREDNISOLONE 4 MG/1
TABLET ORAL
Qty: 21 TABLET | Refills: 0 | Status: SHIPPED | OUTPATIENT
Start: 2022-08-15 | End: 2022-10-20

## 2022-08-15 RX ORDER — HYDROXYZINE HYDROCHLORIDE 25 MG/1
25 TABLET, FILM COATED ORAL 3 TIMES DAILY PRN
Qty: 30 TABLET | Refills: 0 | Status: SHIPPED | OUTPATIENT
Start: 2022-08-15 | End: 2022-10-20

## 2022-08-15 ASSESSMENT — ENCOUNTER SYMPTOMS
BLOOD IN STOOL: 0
NAUSEA: 0
CHILLS: 0
FEVER: 0
DIARRHEA: 0
ABDOMINAL PAIN: 0
VOMITING: 0
CONSTIPATION: 0

## 2022-08-15 ASSESSMENT — FIBROSIS 4 INDEX: FIB4 SCORE: 1.16

## 2022-08-15 NOTE — PROGRESS NOTES
Subjective:   Adelaide Pereira is a 67 y.o. female who presents for Rash and Urticaria (X 5days )        Patient presents with concerns of a diffuse red itchy rash that began over the weekend and worsened over the last 48 hours.  Patient was spending the weekend with friends near [facility].  Symptoms began shortly after eating breakfast.  Patient states that she does have multiple food allergies, but does not recall eating any nuts seeds or other potential allergens.  She did eat cantaloupe that morning.  Symptoms seemed to be slightly improving.  Patient again ate cantaloupe multiple times yesterday and she woke up today with significantly worsened symptoms.  She denies shortness of breath, wheezing, nausea, vomiting, fevers, chills, diarrhea, abdominal pain.  Denies prior reaction to cantaloupe.  Patient has not started any new medications.  Patient has been taking Allegra and Benadryl with minimal symptomatic relief.    Review of Systems   Constitutional:  Negative for chills and fever.   Gastrointestinal:  Negative for abdominal pain, blood in stool, constipation, diarrhea, melena, nausea and vomiting.   Skin:  Positive for itching and rash.     PMH:  has a past medical history of Chickenpox, [place] measles, Hyperlipidemia, Hypothyroidism, Influenza, Mumps, Obesity, Sleep apnea (2011), and thyroid nodules (2000).  MEDS:   Current Outpatient Medications:     methylPREDNISolone (MEDROL DOSEPAK) 4 MG Tablet Therapy Pack, Follow schedule on package instructions., Disp: 21 Tablet, Rfl: 0    hydrOXYzine HCl (ATARAX) 25 MG Tab, Take 1 Tablet by mouth 3 times a day as needed for Itching., Disp: 30 Tablet, Rfl: 0    Multiple Vitamins-Minerals (PRESERVISION AREDS PO), Take  by mouth., Disp: , Rfl:     triamterene-hctz (MAXZIDE-25/DYAZIDE) 37.5-25 MG Tab, TAKE 1/2 TABLET BY MOUTH DAILY, Disp: 45 Tablet, Rfl: 2    thyroid (ARMOUR THYROID) 60 MG Tab, TAKE  1.25  Tab BY MOUTH FIRST THING IN THE MORNING, Disp: 40  "Tablet, Rfl: 1    EPINEPHrine (EPIPEN) 0.3 MG/0.3ML Solution Auto-injector solution for injection, INJECT INTO THIGH UTD, Disp: 1 Each, Rfl: 0    Coenzyme Q10 (CO Q 10 PO), Take  by mouth., Disp: , Rfl:     Fexofenadine HCl (ALLEGRA ALLERGY PO), Take  by mouth., Disp: , Rfl:     ELDERBERRY PO, Take  by mouth., Disp: , Rfl:     vitamin D, Ergocalciferol, (DRISDOL) 1.25 MG (09380 UT) Cap capsule, Take  by mouth every 7 days., Disp: , Rfl:     Flaxseed, Linseed, (FLAX SEED OIL PO), Take  by mouth., Disp: , Rfl:     Probiotic Product (PROBIOTIC-10 PO), Take  by mouth., Disp: , Rfl:   ALLERGIES:   Allergies   Allergen Reactions    Nitrofurantoin Rash    Aspirin     Codeine     Fish     Maxepa     Peanut Oil     Tree Nuts Food Allergy Anaphylaxis     SURGHX:   Past Surgical History:   Procedure Laterality Date    PRIMARY C SECTION       SOCHX:  reports that she has never smoked. She has never used smokeless tobacco. She reports current alcohol use. She reports that she does not use drugs.  FH: Family history was reviewed, no pertinent findings to report   Objective:   /82 (BP Location: Left arm, Patient Position: Sitting, BP Cuff Size: Large adult)   Pulse 76   Temp 36.4 °C (97.6 °F) (Temporal)   Resp 18   Ht 1.676 m (5' 6\")   Wt 122 kg (270 lb)   SpO2 95%   Breastfeeding No   BMI 43.58 kg/m²   Physical Exam  Vitals reviewed.   Constitutional:       General: She is not in acute distress.     Appearance: Normal appearance. She is well-developed. She is not toxic-appearing.   HENT:      Head: Normocephalic and atraumatic.      Right Ear: External ear normal.      Left Ear: External ear normal.      Nose: Nose normal.   Cardiovascular:      Rate and Rhythm: Normal rate and regular rhythm.      Heart sounds: Normal heart sounds, S1 normal and S2 normal.   Pulmonary:      Effort: Pulmonary effort is normal. No respiratory distress.      Breath sounds: Normal breath sounds. No stridor. No decreased breath sounds, " wheezing, rhonchi or rales.   Skin:     General: Skin is dry.      Comments: Diffusely distributed erythematous macular rash on upper and lower extremities, back, chest, abdomen bilaterally.  Rash blanches.  No vesicles.  Nontender to palpation.   Neurological:      Comments: Alert and oriented.    Psychiatric:         Speech: Speech normal.         Behavior: Behavior normal.         Assessment/Plan:   1. Rash and nonspecific skin eruption  - methylPREDNISolone (MEDROL DOSEPAK) 4 MG Tablet Therapy Pack; Follow schedule on package instructions.  Dispense: 21 Tablet; Refill: 0  - hydrOXYzine HCl (ATARAX) 25 MG Tab; Take 1 Tablet by mouth 3 times a day as needed for Itching.  Dispense: 30 Tablet; Refill: 0    Considerations include but not limited to allergic reaction, viral exanthem, polymorphous light eruption.  This does not look like anaphylaxis or vasculitis at this time.    Patient started on Medrol Dosepak.  I would like her to continue Allegra daily and take hydroxyzine as needed for itching.  If no improvement within 48 hours, new symptoms develop, symptoms worsen return to clinic or see PCP for reevaluation.  Patient has a good understanding of signs and symptoms of anaphylaxis can also has an EpiPen at home.  ED precautions reviewed.    Differential diagnosis, natural history, supportive care, and indications for immediate follow-up discussed.

## 2022-08-23 NOTE — TELEPHONE ENCOUNTER
3rd attempt-   Called to schedule COMPREHENSIVE HEALTH ASSESSMENT. LVM requesting a call back.     PCP is correct in [facility]

## 2022-09-07 DIAGNOSIS — E06.3 HYPOTHYROIDISM DUE TO HASHIMOTO'S THYROIDITIS: ICD-10-CM

## 2022-09-07 DIAGNOSIS — E03.8 HYPOTHYROIDISM DUE TO HASHIMOTO'S THYROIDITIS: ICD-10-CM

## 2022-09-08 DIAGNOSIS — E03.8 HYPOTHYROIDISM DUE TO HASHIMOTO'S THYROIDITIS: ICD-10-CM

## 2022-09-08 DIAGNOSIS — E06.3 HYPOTHYROIDISM DUE TO HASHIMOTO'S THYROIDITIS: ICD-10-CM

## 2022-09-08 RX ORDER — THYROID 60 MG/1
TABLET ORAL
Qty: 112 TABLET | Refills: 1 | Status: SHIPPED | OUTPATIENT
Start: 2022-09-08 | End: 2022-09-13

## 2022-09-08 RX ORDER — THYROID 60 MG/1
TABLET ORAL
Qty: 40 TABLET | Refills: 0 | Status: SHIPPED | OUTPATIENT
Start: 2022-09-08 | End: 2022-09-08

## 2022-09-08 NOTE — TELEPHONE ENCOUNTER
Requested Prescriptions     Pending Prescriptions Disp Refills   • thyroid (NP THYROID) 60 MG Tab [Pharmacy Med Name: THYROID NP 1GR (60MG) TABLETS] 112 Tablet 1     Sig: TAKE 1.25 TABLET BY MOUTH FIRST THING IN THE MORNING

## 2022-10-11 ENCOUNTER — HOSPITAL ENCOUNTER (OUTPATIENT)
Dept: LAB | Facility: MEDICAL CENTER | Age: 67
End: 2022-10-11
Attending: NURSE PRACTITIONER
Payer: MEDICARE

## 2022-10-11 DIAGNOSIS — Z13.6 ENCOUNTER FOR LIPID SCREENING FOR CARDIOVASCULAR DISEASE: ICD-10-CM

## 2022-10-11 DIAGNOSIS — E55.9 VITAMIN D DEFICIENCY: ICD-10-CM

## 2022-10-11 DIAGNOSIS — Z13.220 ENCOUNTER FOR LIPID SCREENING FOR CARDIOVASCULAR DISEASE: ICD-10-CM

## 2022-10-11 DIAGNOSIS — I10 PRIMARY HYPERTENSION: ICD-10-CM

## 2022-10-11 DIAGNOSIS — E06.3 HYPOTHYROIDISM DUE TO HASHIMOTO'S THYROIDITIS: ICD-10-CM

## 2022-10-11 DIAGNOSIS — Z11.59 NEED FOR HEPATITIS C SCREENING TEST: ICD-10-CM

## 2022-10-11 DIAGNOSIS — Z13.0 SCREENING FOR DEFICIENCY ANEMIA: ICD-10-CM

## 2022-10-11 DIAGNOSIS — E03.8 HYPOTHYROIDISM DUE TO HASHIMOTO'S THYROIDITIS: ICD-10-CM

## 2022-10-11 LAB
25(OH)D3 SERPL-MCNC: 35 NG/ML (ref 30–100)
ALBUMIN SERPL BCP-MCNC: 4.4 G/DL (ref 3.2–4.9)
ALBUMIN/GLOB SERPL: 1.7 G/DL
ALP SERPL-CCNC: 105 U/L (ref 30–99)
ALT SERPL-CCNC: 18 U/L (ref 2–50)
ANION GAP SERPL CALC-SCNC: 9 MMOL/L (ref 7–16)
AST SERPL-CCNC: 18 U/L (ref 12–45)
BASOPHILS # BLD AUTO: 0.4 % (ref 0–1.8)
BASOPHILS # BLD: 0.02 K/UL (ref 0–0.12)
BILIRUB SERPL-MCNC: 0.7 MG/DL (ref 0.1–1.5)
BUN SERPL-MCNC: 10 MG/DL (ref 8–22)
CALCIUM SERPL-MCNC: 9.3 MG/DL (ref 8.5–10.5)
CHLORIDE SERPL-SCNC: 98 MMOL/L (ref 96–112)
CHOLEST SERPL-MCNC: 184 MG/DL (ref 100–199)
CO2 SERPL-SCNC: 27 MMOL/L (ref 20–33)
CREAT SERPL-MCNC: 0.38 MG/DL (ref 0.5–1.4)
EOSINOPHIL # BLD AUTO: 0.11 K/UL (ref 0–0.51)
EOSINOPHIL NFR BLD: 2 % (ref 0–6.9)
ERYTHROCYTE [DISTWIDTH] IN BLOOD BY AUTOMATED COUNT: 48.4 FL (ref 35.9–50)
FASTING STATUS PATIENT QL REPORTED: NORMAL
GFR SERPLBLD CREATININE-BSD FMLA CKD-EPI: 108 ML/MIN/1.73 M 2
GLOBULIN SER CALC-MCNC: 2.6 G/DL (ref 1.9–3.5)
GLUCOSE SERPL-MCNC: 89 MG/DL (ref 65–99)
HCT VFR BLD AUTO: 45.4 % (ref 37–47)
HCV AB SER QL: NORMAL
HDLC SERPL-MCNC: 73 MG/DL
HGB BLD-MCNC: 15.2 G/DL (ref 12–16)
IMM GRANULOCYTES # BLD AUTO: 0.03 K/UL (ref 0–0.11)
IMM GRANULOCYTES NFR BLD AUTO: 0.6 % (ref 0–0.9)
LDLC SERPL CALC-MCNC: 96 MG/DL
LYMPHOCYTES # BLD AUTO: 1.62 K/UL (ref 1–4.8)
LYMPHOCYTES NFR BLD: 30.2 % (ref 22–41)
MCH RBC QN AUTO: 30.9 PG (ref 27–33)
MCHC RBC AUTO-ENTMCNC: 33.5 G/DL (ref 33.6–35)
MCV RBC AUTO: 92.3 FL (ref 81.4–97.8)
MONOCYTES # BLD AUTO: 0.45 K/UL (ref 0–0.85)
MONOCYTES NFR BLD AUTO: 8.4 % (ref 0–13.4)
NEUTROPHILS # BLD AUTO: 3.14 K/UL (ref 2–7.15)
NEUTROPHILS NFR BLD: 58.4 % (ref 44–72)
NRBC # BLD AUTO: 0 K/UL
NRBC BLD-RTO: 0 /100 WBC
PLATELET # BLD AUTO: 270 K/UL (ref 164–446)
PMV BLD AUTO: 10.6 FL (ref 9–12.9)
POTASSIUM SERPL-SCNC: 4.1 MMOL/L (ref 3.6–5.5)
PROT SERPL-MCNC: 7 G/DL (ref 6–8.2)
RBC # BLD AUTO: 4.92 M/UL (ref 4.2–5.4)
SODIUM SERPL-SCNC: 134 MMOL/L (ref 135–145)
T3 SERPL-MCNC: 136 NG/DL (ref 60–181)
T4 FREE SERPL-MCNC: 1.04 NG/DL (ref 0.93–1.7)
TRIGL SERPL-MCNC: 77 MG/DL (ref 0–149)
TSH SERPL DL<=0.005 MIU/L-ACNC: 2.58 UIU/ML (ref 0.38–5.33)
WBC # BLD AUTO: 5.4 K/UL (ref 4.8–10.8)

## 2022-10-11 PROCEDURE — 80061 LIPID PANEL: CPT

## 2022-10-11 PROCEDURE — 85025 COMPLETE CBC W/AUTO DIFF WBC: CPT

## 2022-10-11 PROCEDURE — 36415 COLL VENOUS BLD VENIPUNCTURE: CPT

## 2022-10-11 PROCEDURE — 84439 ASSAY OF FREE THYROXINE: CPT

## 2022-10-11 PROCEDURE — 84480 ASSAY TRIIODOTHYRONINE (T3): CPT

## 2022-10-11 PROCEDURE — 86803 HEPATITIS C AB TEST: CPT

## 2022-10-11 PROCEDURE — 80053 COMPREHEN METABOLIC PANEL: CPT

## 2022-10-11 PROCEDURE — 82306 VITAMIN D 25 HYDROXY: CPT

## 2022-10-11 PROCEDURE — 84443 ASSAY THYROID STIM HORMONE: CPT

## 2022-10-20 ENCOUNTER — OFFICE VISIT (OUTPATIENT)
Dept: MEDICAL GROUP | Facility: PHYSICIAN GROUP | Age: 67
End: 2022-10-20
Payer: MEDICARE

## 2022-10-20 VITALS
OXYGEN SATURATION: 96 % | WEIGHT: 273.06 LBS | HEART RATE: 76 BPM | HEIGHT: 66 IN | RESPIRATION RATE: 20 BRPM | BODY MASS INDEX: 43.88 KG/M2 | TEMPERATURE: 98.2 F | DIASTOLIC BLOOD PRESSURE: 84 MMHG | SYSTOLIC BLOOD PRESSURE: 126 MMHG

## 2022-10-20 DIAGNOSIS — E06.3 HYPOTHYROIDISM DUE TO HASHIMOTO'S THYROIDITIS: ICD-10-CM

## 2022-10-20 DIAGNOSIS — E66.01 CLASS 3 SEVERE OBESITY DUE TO EXCESS CALORIES WITHOUT SERIOUS COMORBIDITY WITH BODY MASS INDEX (BMI) OF 40.0 TO 44.9 IN ADULT (HCC): ICD-10-CM

## 2022-10-20 DIAGNOSIS — E03.8 HYPOTHYROIDISM DUE TO HASHIMOTO'S THYROIDITIS: ICD-10-CM

## 2022-10-20 DIAGNOSIS — Z91.018 FOOD ALLERGY: ICD-10-CM

## 2022-10-20 PROBLEM — E66.813 CLASS 3 SEVERE OBESITY DUE TO EXCESS CALORIES WITHOUT SERIOUS COMORBIDITY WITH BODY MASS INDEX (BMI) OF 40.0 TO 44.9 IN ADULT (HCC): Status: ACTIVE | Noted: 2018-06-28

## 2022-10-20 PROCEDURE — 99214 OFFICE O/P EST MOD 30 MIN: CPT | Performed by: NURSE PRACTITIONER

## 2022-10-20 RX ORDER — EPINEPHRINE 0.3 MG/.3ML
INJECTION SUBCUTANEOUS
Qty: 1 EACH | Refills: 0 | Status: SHIPPED | OUTPATIENT
Start: 2022-10-20

## 2022-10-20 RX ORDER — THYROID 60 MG/1
60 TABLET ORAL DAILY
COMMUNITY
End: 2022-12-12

## 2022-10-20 RX ORDER — EPINEPHRINE 0.3 MG/.3ML
INJECTION SUBCUTANEOUS
Qty: 1 EACH | Refills: 0 | Status: SHIPPED | OUTPATIENT
Start: 2022-10-20 | End: 2022-10-20

## 2022-10-20 ASSESSMENT — FIBROSIS 4 INDEX: FIB4 SCORE: 1.08

## 2022-10-20 NOTE — ASSESSMENT & PLAN NOTE
Currently taking NP Thyroid 60 mg 1.25 daily as prescribed.   Denies symptoms including mood changes, anxiety/depression, chest pain/pressure, palpitations, fatigue, heat/cold intolerance, polydipsia, polyuria, diarrhea/constipation, abdominal pain, unexpected weight change, skin changes, hair thickening/coarsening/falling out/thinning, brittle nails, or edema.     10/14/21 07:47 4/21/22 08:09 10/11/22 08:10   TSH 1.690 2.570 2.580   Free T-4 1.08 1.13 1.04   T3   136.0   T3,Free 3.90 3.81

## 2022-10-20 NOTE — ASSESSMENT & PLAN NOTE
Chronic and ongoing. Has allergy to nuts and fish. Is requesting a new epi pen as her epi pen is about to .

## 2022-10-20 NOTE — ASSESSMENT & PLAN NOTE
Chronic and ongoing. She is currently working on her diet with weight watchers. She is going to need knee surgery and she knows she will need to lose weight before that can happen. She has been doing exercises at home that her physical therapist has taught her that has helped some.

## 2022-10-20 NOTE — PROGRESS NOTES
Subjective  Chief Complaint  Lab Results    History of Present Illness  Adelaide Pereira is a 67 y.o. female. This established patient is here today to go over recent lab results.    Class 3 severe obesity due to excess calories without serious comorbidity with body mass index (BMI) of 40.0 to 44.9 in adult (HCC)  Chronic and ongoing. She is currently working on her diet with weight watchers. She is going to need knee surgery and she knows she will need to lose weight before that can happen. She has been doing exercises at home that her physical therapist has taught her that has helped some.    Hypothyroidism  Currently taking NP Thyroid 60 mg 1.25 daily as prescribed.   Denies symptoms including mood changes, anxiety/depression, chest pain/pressure, palpitations, fatigue, heat/cold intolerance, polydipsia, polyuria, diarrhea/constipation, abdominal pain, unexpected weight change, skin changes, hair thickening/coarsening/falling out/thinning, brittle nails, or edema.     10/14/21 07:47 22 08:09 10/11/22 08:10   TSH 1.690 2.570 2.580   Free T-4 1.08 1.13 1.04   T3   136.0   T3,Free 3.90 3.81        Food allergy  Chronic and ongoing. Has allergy to nuts and fish. Is requesting a new epi pen as her epi pen is about to .    Past Medical History    Allergies: Nitrofurantoin, Aspirin, Codeine, Fish, Maxepa, Peanut oil, and Tree nuts food allergy  Past Medical History:   Diagnosis Date    Chickenpox     [place] measles     Hyperlipidemia     Hypothyroidism     Influenza     Mumps     Obesity     Sleep apnea     on CPAP    thyroid nodules     biopsy Hashimoto's thyroiditis / TPO ab neg     Past Surgical History:   Procedure Laterality Date    PRIMARY C SECTION       Current Outpatient Medications Ordered in Epic   Medication Sig Dispense Refill    thyroid (NP THYROID) 60 MG Tab Take 60 mg by mouth every day. 1.25 tablets a day  Indications: Underactive Thyroid      EPINEPHrine (EPIPEN) 0.3 MG/0.3ML  "Solution Auto-injector solution for injection INJECT INTO THIGH UTD 1 Each 0    Multiple Vitamins-Minerals (PRESERVISION AREDS PO) Take  by mouth.      triamterene-hctz (MAXZIDE-25/DYAZIDE) 37.5-25 MG Tab TAKE 1/2 TABLET BY MOUTH DAILY 45 Tablet 2    Coenzyme Q10 (CO Q 10 PO) Take  by mouth.      Fexofenadine HCl (ALLEGRA ALLERGY PO) Take  by mouth.      ELDERBERRY PO Take  by mouth.      vitamin D, Ergocalciferol, (DRISDOL) 1.25 MG (90196 UT) Cap capsule Take  by mouth every 7 days.      Flaxseed, Linseed, (FLAX SEED OIL PO) Take  by mouth.      Probiotic Product (PROBIOTIC-10 PO) Take  by mouth.       No current Epic-ordered facility-administered medications on file.     Family History:    Family History   Problem Relation Age of Onset    Thyroid Mother     Cancer Mother         breast    Heart Attack Father     Sleep Apnea Father     Arterial Aneurysm Father     Sleep Apnea Brother     Cancer Maternal Aunt         breast    No Known Problems Sister     Diabetes Maternal Grandmother     No Known Problems Son     Thyroid Daughter       Personal/Social History:    Social History     Tobacco Use    Smoking status: Never    Smokeless tobacco: Never   Vaping Use    Vaping Use: Never used   Substance Use Topics    Alcohol use: Yes     Comment: Occasionally     Drug use: No     Social History     Social History Narrative    Not on file      Review of Systems:   General: Negative for fever/chills and unexpected weight change.   Respiratory:  Negative for cough and dyspnea.    Cardiovascular:  Negative for chest pain and palpitations.   Skin:  Negative for rash.     Objective  Physical Exam:   /84 (BP Location: Left arm, Patient Position: Sitting, BP Cuff Size: Large adult)   Pulse 76   Temp 36.8 °C (98.2 °F) (Temporal)   Resp 20   Ht 1.676 m (5' 6\")   Wt 124 kg (273 lb 1 oz)   SpO2 96%  Body mass index is 44.07 kg/m².  General:  Alert and oriented.  Well appearing.  NAD  Neck: Supple without JVD. No " lymphadenopathy.  Pulmonary:  Normal effort.  Clear to ausculation without rales, ronchi, or wheezing.  Cardiovascular:  Regular rate and rhythm without murmur, rubs or gallop.   Skin:  Warm and dry.  No obvious lesions.      Assessment/Plan  1. Class 3 severe obesity due to excess calories without serious comorbidity with body mass index (BMI) of 40.0 to 44.9 in adult (HCC)  Chronic and ongoing.  Educated on a healthy diet and exercise.    2. Hypothyroidism due to Hashimoto's thyroiditis  Chronic and ongoing.  Continue to take NP Thyroid 60 mg 1.25 tablets daily.    3. Food allergy  Chronic and ongoing.  Refill of epipen placed at this time.  - EPINEPHrine (EPIPEN) 0.3 MG/0.3ML Solution Auto-injector solution for injection; INJECT INTO THIGH UTD  Dispense: 1 Each; Refill: 0      Health Maintenance: Discussed with the patient.    Return in about 3 months (around 1/20/2023) for F/U.    Please note that this dictation was created using voice recognition software. I have made every reasonable attempt to correct obvious errors, but I expect that there are errors of grammar and possibly content that I did not discover before finalizing the note.    JIM Grace  [facility]

## 2022-10-28 ENCOUNTER — DOCUMENTATION (OUTPATIENT)
Dept: HEALTH INFORMATION MANAGEMENT | Facility: OTHER | Age: 67
End: 2022-10-28
Payer: MEDICARE

## 2022-12-12 DIAGNOSIS — E06.3 HYPOTHYROIDISM DUE TO HASHIMOTO'S THYROIDITIS: ICD-10-CM

## 2022-12-12 DIAGNOSIS — E03.8 HYPOTHYROIDISM DUE TO HASHIMOTO'S THYROIDITIS: ICD-10-CM

## 2022-12-12 RX ORDER — THYROID 60 MG/1
TABLET ORAL
Qty: 112 TABLET | Refills: 1 | Status: SHIPPED | OUTPATIENT
Start: 2022-12-12 | End: 2022-12-14

## 2022-12-13 NOTE — PROGRESS NOTES
1. Hypothyroidism due to Hashimoto's thyroiditis  - thyroid (ARMOUR THYROID) 60 MG Tab; Take 1.25 tablet by mouth every morning on an empty stomach.  Dispense: 112 Tablet; Refill: 1

## 2022-12-14 DIAGNOSIS — E06.3 HYPOTHYROIDISM DUE TO HASHIMOTO'S THYROIDITIS: ICD-10-CM

## 2022-12-14 DIAGNOSIS — E03.8 HYPOTHYROIDISM DUE TO HASHIMOTO'S THYROIDITIS: ICD-10-CM

## 2022-12-14 RX ORDER — THYROID 60 MG/1
TABLET ORAL
Qty: 112 TABLET | Refills: 1 | Status: SHIPPED | OUTPATIENT
Start: 2022-12-14 | End: 2023-05-02

## 2022-12-14 NOTE — PROGRESS NOTES
1. Hypothyroidism due to Hashimoto's thyroiditis  - thyroid (NP THYROID) 60 MG Tab; Take 1.25 tablet by mouth every morning on an empty stomach.  Dispense: 112 Tablet; Refill: 1

## 2023-02-21 DIAGNOSIS — I10 PRIMARY HYPERTENSION: ICD-10-CM

## 2023-02-22 RX ORDER — TRIAMTERENE AND HYDROCHLOROTHIAZIDE 37.5; 25 MG/1; MG/1
TABLET ORAL
Qty: 100 TABLET | Refills: 2 | Status: SHIPPED | OUTPATIENT
Start: 2023-02-22

## 2023-02-22 NOTE — TELEPHONE ENCOUNTER
Received request via: Pharmacy    Was the patient seen in the last year in this department? Yes    Does the patient have an active prescription (recently filled or refills available) for medication(s) requested? No    Does the patient have [facility] and need 100 day supply (blood pressure, diabetes and cholesterol meds only)? Yes, quantity updated to 100 days

## 2023-03-09 PROBLEM — M17.12 OSTEOARTHRITIS OF LEFT KNEE: Status: ACTIVE | Noted: 2023-03-09

## 2023-03-19 SDOH — ECONOMIC STABILITY: TRANSPORTATION INSECURITY
IN THE PAST 12 MONTHS, HAS THE LACK OF TRANSPORTATION KEPT YOU FROM MEDICAL APPOINTMENTS OR FROM GETTING MEDICATIONS?: NO

## 2023-03-19 SDOH — HEALTH STABILITY: PHYSICAL HEALTH: ON AVERAGE, HOW MANY DAYS PER WEEK DO YOU ENGAGE IN MODERATE TO STRENUOUS EXERCISE (LIKE A BRISK WALK)?: 4 DAYS

## 2023-03-19 SDOH — ECONOMIC STABILITY: INCOME INSECURITY: HOW HARD IS IT FOR YOU TO PAY FOR THE VERY BASICS LIKE FOOD, HOUSING, MEDICAL CARE, AND HEATING?: NOT VERY HARD

## 2023-03-19 SDOH — ECONOMIC STABILITY: INCOME INSECURITY: IN THE LAST 12 MONTHS, WAS THERE A TIME WHEN YOU WERE NOT ABLE TO PAY THE MORTGAGE OR RENT ON TIME?: NO

## 2023-03-19 SDOH — ECONOMIC STABILITY: HOUSING INSECURITY: IN THE LAST 12 MONTHS, HOW MANY PLACES HAVE YOU LIVED?: 1

## 2023-03-19 SDOH — ECONOMIC STABILITY: FOOD INSECURITY: WITHIN THE PAST 12 MONTHS, THE FOOD YOU BOUGHT JUST DIDN'T LAST AND YOU DIDN'T HAVE MONEY TO GET MORE.: NEVER TRUE

## 2023-03-19 SDOH — ECONOMIC STABILITY: FOOD INSECURITY: WITHIN THE PAST 12 MONTHS, YOU WORRIED THAT YOUR FOOD WOULD RUN OUT BEFORE YOU GOT MONEY TO BUY MORE.: NEVER TRUE

## 2023-03-19 SDOH — HEALTH STABILITY: MENTAL HEALTH
STRESS IS WHEN SOMEONE FEELS TENSE, NERVOUS, ANXIOUS, OR CAN'T SLEEP AT NIGHT BECAUSE THEIR MIND IS TROUBLED. HOW STRESSED ARE YOU?: TO SOME EXTENT

## 2023-03-19 SDOH — ECONOMIC STABILITY: HOUSING INSECURITY
IN THE LAST 12 MONTHS, WAS THERE A TIME WHEN YOU DID NOT HAVE A STEADY PLACE TO SLEEP OR SLEPT IN A SHELTER (INCLUDING NOW)?: NO

## 2023-03-19 SDOH — HEALTH STABILITY: PHYSICAL HEALTH: ON AVERAGE, HOW MANY MINUTES DO YOU ENGAGE IN EXERCISE AT THIS LEVEL?: 120 MIN

## 2023-03-19 SDOH — ECONOMIC STABILITY: TRANSPORTATION INSECURITY
IN THE PAST 12 MONTHS, HAS LACK OF TRANSPORTATION KEPT YOU FROM MEETINGS, WORK, OR FROM GETTING THINGS NEEDED FOR DAILY LIVING?: NO

## 2023-03-19 SDOH — ECONOMIC STABILITY: TRANSPORTATION INSECURITY
IN THE PAST 12 MONTHS, HAS LACK OF RELIABLE TRANSPORTATION KEPT YOU FROM MEDICAL APPOINTMENTS, MEETINGS, WORK OR FROM GETTING THINGS NEEDED FOR DAILY LIVING?: NO

## 2023-03-19 ASSESSMENT — SOCIAL DETERMINANTS OF HEALTH (SDOH)
HOW OFTEN DO YOU ATTEND CHURCH OR RELIGIOUS SERVICES?: MORE THAN 4 TIMES PER YEAR
HOW OFTEN DO YOU GET TOGETHER WITH FRIENDS OR RELATIVES?: MORE THAN THREE TIMES A WEEK
HOW OFTEN DO YOU ATTENT MEETINGS OF THE CLUB OR ORGANIZATION YOU BELONG TO?: MORE THAN 4 TIMES PER YEAR
HOW MANY DRINKS CONTAINING ALCOHOL DO YOU HAVE ON A TYPICAL DAY WHEN YOU ARE DRINKING: PATIENT DOES NOT DRINK
HOW HARD IS IT FOR YOU TO PAY FOR THE VERY BASICS LIKE FOOD, HOUSING, MEDICAL CARE, AND HEATING?: NOT VERY HARD
WITHIN THE PAST 12 MONTHS, YOU WORRIED THAT YOUR FOOD WOULD RUN OUT BEFORE YOU GOT THE MONEY TO BUY MORE: NEVER TRUE
HOW OFTEN DO YOU ATTEND CHURCH OR RELIGIOUS SERVICES?: MORE THAN 4 TIMES PER YEAR
DO YOU BELONG TO ANY CLUBS OR ORGANIZATIONS SUCH AS CHURCH GROUPS UNIONS, FRATERNAL OR ATHLETIC GROUPS, OR SCHOOL GROUPS?: YES
IN A TYPICAL WEEK, HOW MANY TIMES DO YOU TALK ON THE PHONE WITH FAMILY, FRIENDS, OR NEIGHBORS?: MORE THAN THREE TIMES A WEEK
HOW OFTEN DO YOU ATTENT MEETINGS OF THE CLUB OR ORGANIZATION YOU BELONG TO?: MORE THAN 4 TIMES PER YEAR
HOW OFTEN DO YOU HAVE SIX OR MORE DRINKS ON ONE OCCASION: NEVER
DO YOU BELONG TO ANY CLUBS OR ORGANIZATIONS SUCH AS CHURCH GROUPS UNIONS, FRATERNAL OR ATHLETIC GROUPS, OR SCHOOL GROUPS?: YES
IN A TYPICAL WEEK, HOW MANY TIMES DO YOU TALK ON THE PHONE WITH FAMILY, FRIENDS, OR NEIGHBORS?: MORE THAN THREE TIMES A WEEK
HOW OFTEN DO YOU GET TOGETHER WITH FRIENDS OR RELATIVES?: MORE THAN THREE TIMES A WEEK
HOW OFTEN DO YOU HAVE A DRINK CONTAINING ALCOHOL: NEVER

## 2023-03-19 ASSESSMENT — LIFESTYLE VARIABLES
AUDIT-C TOTAL SCORE: 0
HOW MANY STANDARD DRINKS CONTAINING ALCOHOL DO YOU HAVE ON A TYPICAL DAY: PATIENT DOES NOT DRINK
HOW OFTEN DO YOU HAVE A DRINK CONTAINING ALCOHOL: NEVER
HOW OFTEN DO YOU HAVE SIX OR MORE DRINKS ON ONE OCCASION: NEVER
SKIP TO QUESTIONS 9-10: 1

## 2023-03-20 NOTE — PROGRESS NOTES
Subjective:     CC:    Chief Complaint   Patient presents with    Establish Care    Medication Management     A medication has been making her urine smell          HISTORY OF THE PRESENT ILLNESS:   Adelaide Pereira is a 67-year-old female who presents to [facility]. The patient's current medical conditions were discussed as outlined below in the assessment and plan.  The patient's past medical and surgical history, medications, allergies, and family history were reviewed.  The patient is due for updated labs, she is up-to-date on her bone density, mammogram, and colon cancer screening.  She declines immunizations at today's visit.      Allergies: Nitrofurantoin, Aspirin, Codeine, Fish, Maxepa, Peanut oil, and Tree nuts food allergy    Current Outpatient Medications Ordered in Epic   Medication Sig Dispense Refill    Semaglutide,0.25 or 0.5MG/DOS, (OZEMPIC, 0.25 OR 0.5 MG/DOSE,) 2 MG/1.5ML Solution Pen-injector Inject 0.25 mg under the skin every 7 days. 1.5 mL 3    triamterene-hctz (MAXZIDE-25/DYAZIDE) 37.5-25 MG Tab TAKE 1/2 TABLET BY MOUTH DAILY 100 Tablet 2    thyroid (NP THYROID) 60 MG Tab Take 1.25 tablet by mouth every morning on an empty stomach. 112 Tablet 1    EPINEPHrine (EPIPEN) 0.3 MG/0.3ML Solution Auto-injector solution for injection INJECT INTO THIGH UTD 1 Each 0    Multiple Vitamins-Minerals (PRESERVISION AREDS PO) Take  by mouth.      Coenzyme Q10 (CO Q 10 PO) Take  by mouth.      Fexofenadine HCl (ALLEGRA ALLERGY PO) Take  by mouth.      ELDERBERRY PO Take  by mouth.      vitamin D, Ergocalciferol, (DRISDOL) 1.25 MG (55786 UT) Cap capsule Take  by mouth every 7 days.      Flaxseed, Linseed, (FLAX SEED OIL PO) Take  by mouth.      Probiotic Product (PROBIOTIC-10 PO) Take  by mouth.       No current Epic-ordered facility-administered medications on file.       Past Medical History:   Diagnosis Date    Chickenpox     [place] measles     Hyperlipidemia     Hypothyroidism     Influenza     Mumps      "Obesity     Sleep apnea 2011    on CPAP    thyroid nodules 2000    biopsy Hashimoto's thyroiditis / TPO ab neg       Past Surgical History:   Procedure Laterality Date    PRIMARY C SECTION         Social History     Tobacco Use    Smoking status: Never    Smokeless tobacco: Never   Vaping Use    Vaping Use: Never used   Substance Use Topics    Alcohol use: Yes     Comment: Occasionally     Drug use: No       Social History     Social History Narrative    Not on file       Family History   Problem Relation Age of Onset    Thyroid Mother     Cancer Mother         breast    Heart Attack Father     Sleep Apnea Father     Arterial Aneurysm Father     Sleep Apnea Brother     Cancer Maternal Aunt         breast    No Known Problems Sister     Diabetes Maternal Grandmother     No Known Problems Son     Thyroid Daughter        Health Maintenance: Completed    Review of Systems:  Constitutional: Negative for fever, chills.  Respiratory: Negative for cough and shortness of breath.    Cardiovascular: Negative for chest pain or palpitations.  Gastrointestinal: Negative for abdominal pain, nausea, vomiting, and diarrhea.   Neurological: Negative for headaches, numbness, or tingling.  Psychiatric: Negative for anxiety or depression.      Objective:     Exam: /80 (BP Location: Right arm, Patient Position: Sitting, BP Cuff Size: Large adult)   Pulse 74   Temp 36.4 °C (97.6 °F) (Temporal)   Ht 1.676 m (5' 6\")   Wt 123 kg (271 lb 4.8 oz)   SpO2 96%  Body mass index is 43.79 kg/m².    Constitutional: Well-developed, no acute distress.  HEENT: Pupils are equal, round, and reactive to light. Oropharynx is without erythema, edema or exudates.   Neck: No thyromegaly or palpable thyroid nodules. No cervical or supraclavicular lymphadenopathy noted.  Lungs: Clear to auscultation bilaterally. No wheezes, rhonchi, or rales.   Cardiovascular: Regular rate and rhythm, no murmurs noted.   Abdomen: Soft, nontender, nondistended. "   Extremities: No edema or erythema.  Psychiatric:  Behavior, mood, and affect are appropriate.    Assessment & Plan:   67 y.o. female with the following -    Primary hypertension  Hyponatremia  Chronic, controlled.  The patient currently takes triamterene-HCTZ 37.5-25 mg daily.  The patient has a chronic hyponatremia likely secondary to her blood pressure medication.  It has been stable for several years.  Most recent sodium level on 10/11/2022 was 134.  Blood pressure at today's visit is 122/80.  She denies new headaches, vision changes, chest pain, shortness of breath, lower extremity edema.  -Continue triamterene-HCTZ 37.5-25 mg daily, obtain CMP to monitor hyponatremia  - Comp Metabolic Panel; Future    Hypothyroidism due to Hashimoto's thyroiditis  Chronic, controlled.  The patient currently takes NP thyroid 60 mg daily.  She reports being significantly bothered by malodorous urine due to the medication.  She is interested in transitioning to levothyroxine.  Most recent thyroid labs from 10/11/2022 showed a TSH of 2.58, free T4 of 1.04, and no recent free T3 available.  She is clinically euthyroid.  She is due for updated labs.  -Continue NP thyroid 60 mg daily, obtain updated thyroid values prior to converting to levothyroxine  - TSH; Future  - T3 FREE; Future  - FREE THYROXINE; Future    Morbid obesity with body mass index (BMI) of 40.0 to 44.9 in adult (HCC)  Primary osteoarthritis of left knee  Chronic, not controlled.  The patient states she needs to have left TKA, but the surgery cannot be performed until her BMI is 40 or less.  Her current BMI is 43.79.  She is extremely frustrated by her inability to lose weight.  She needs to lose approximately 20 pounds to achieve the BMI of 40.  She has previously done carb counting/restriction, is now doing calorie counting as well a daily limit of 1200.  She also does intermittent fasting.  She walks 50 to 70 miles per month.  -Discussed addition of HIIT type  exercise to help push through her weight loss, would need to involve not impact activity given her knee pain  -Discussed trial of Ozempic, the patient would like to pursue this, prescription sent today, risks, benefits, side effects discussed with patient who expressed verbal understanding  - Semaglutide,0.25 or 0.5MG/DOS, (OZEMPIC, 0.25 OR 0.5 MG/DOSE,) 2 MG/1.5ML Solution Pen-injector; Inject 0.25 mg under the skin every 7 days.  Dispense: 1.5 mL; Refill: 3    Screening for deficiency anemia  - CBC WITH DIFFERENTIAL; Future    Encounter for screening for diabetes mellitus  - Comp Metabolic Panel; Future    Screening for cardiovascular condition  - Lipid Profile; Future      Return in about 4 weeks (around 4/18/2023) for new medication.    Please note that this dictation was created using voice recognition software. I have made every reasonable attempt to correct obvious errors, but I expect that there are errors of grammar and possibly content that I did not discover before finalizing the note.

## 2023-03-21 ENCOUNTER — OFFICE VISIT (OUTPATIENT)
Dept: MEDICAL GROUP | Facility: PHYSICIAN GROUP | Age: 68
End: 2023-03-21
Payer: MEDICARE

## 2023-03-21 VITALS
TEMPERATURE: 97.6 F | BODY MASS INDEX: 43.6 KG/M2 | OXYGEN SATURATION: 96 % | HEIGHT: 66 IN | SYSTOLIC BLOOD PRESSURE: 122 MMHG | DIASTOLIC BLOOD PRESSURE: 80 MMHG | WEIGHT: 271.3 LBS | HEART RATE: 74 BPM

## 2023-03-21 DIAGNOSIS — E87.1 HYPONATREMIA: ICD-10-CM

## 2023-03-21 DIAGNOSIS — I10 PRIMARY HYPERTENSION: ICD-10-CM

## 2023-03-21 DIAGNOSIS — E66.01 MORBID OBESITY WITH BODY MASS INDEX (BMI) OF 40.0 TO 44.9 IN ADULT (HCC): ICD-10-CM

## 2023-03-21 DIAGNOSIS — Z13.1 ENCOUNTER FOR SCREENING FOR DIABETES MELLITUS: ICD-10-CM

## 2023-03-21 DIAGNOSIS — Z13.6 SCREENING FOR CARDIOVASCULAR CONDITION: ICD-10-CM

## 2023-03-21 DIAGNOSIS — Z13.0 SCREENING FOR DEFICIENCY ANEMIA: ICD-10-CM

## 2023-03-21 DIAGNOSIS — E03.8 HYPOTHYROIDISM DUE TO HASHIMOTO'S THYROIDITIS: ICD-10-CM

## 2023-03-21 DIAGNOSIS — E06.3 HYPOTHYROIDISM DUE TO HASHIMOTO'S THYROIDITIS: ICD-10-CM

## 2023-03-21 DIAGNOSIS — M17.12 PRIMARY OSTEOARTHRITIS OF LEFT KNEE: ICD-10-CM

## 2023-03-21 PROCEDURE — 99214 OFFICE O/P EST MOD 30 MIN: CPT | Performed by: INTERNAL MEDICINE

## 2023-03-21 RX ORDER — SEMAGLUTIDE 1.34 MG/ML
0.25 INJECTION, SOLUTION SUBCUTANEOUS
Qty: 1.5 ML | Refills: 3 | Status: SHIPPED | OUTPATIENT
Start: 2023-03-21 | End: 2023-04-25

## 2023-03-21 ASSESSMENT — FIBROSIS 4 INDEX: FIB4 SCORE: 1.08

## 2023-03-21 ASSESSMENT — PATIENT HEALTH QUESTIONNAIRE - PHQ9: CLINICAL INTERPRETATION OF PHQ2 SCORE: 0

## 2023-03-30 RX ORDER — LEVOTHYROXINE SODIUM 0.12 MG/1
125 TABLET ORAL
Qty: 90 TABLET | Refills: 3 | Status: SHIPPED | OUTPATIENT
Start: 2023-03-30 | End: 2024-01-15 | Stop reason: SDUPTHER

## 2023-04-11 NOTE — PROGRESS NOTES
Subjective:     CC:   Chief Complaint   Patient presents with    Follow-Up     Follow up on ozempic          HPI:   Adelaide presents today for follow-up visit and to discuss the following issues:    Morbid obesity with body mass index (BMI) of 40.0 to 44.9 in adult (HCC)  Primary osteoarthritis of left knee  The patient was recently started on Ozempic to assist with weight loss and that she will be a candidate for left knee TKA.  She is doing well so far.  Her BMI at the beginning of her treatment was 43.79.  She needs to have a BMI of less than 40 to undergo surgery. The patient is also doing intermittent fasting and walking 50 to 70 miles monthly.    Hypothyroidism due to Hashimoto's thyroiditis  The patient was recently switched from NP thyroid 60 mg daily to levothyroxine 125 mcg daily as the NP thyroid was causing malodorous urine.  Reviewed her recent thyroid labs on 4/18/2023 which showed a normal TSH of 1.02, normal free T4 of 1.6, and normal free T3 of 2.32.      Past Medical History:   Diagnosis Date    Chickenpox     Sinhala measles     Hyperlipidemia     Hypothyroidism     Influenza     Mumps     Obesity     Sleep apnea 2011    on CPAP    thyroid nodules 2000    biopsy Hashimoto's thyroiditis / TPO ab neg       Social History     Tobacco Use    Smoking status: Never    Smokeless tobacco: Never   Vaping Use    Vaping Use: Never used   Substance Use Topics    Alcohol use: Yes     Comment: Occasionally     Drug use: No       Current Outpatient Medications Ordered in Epic   Medication Sig Dispense Refill    levothyroxine (SYNTHROID) 125 MCG Tab Take 1 Tablet by mouth every morning on an empty stomach. 90 Tablet 3    triamterene-hctz (MAXZIDE-25/DYAZIDE) 37.5-25 MG Tab TAKE 1/2 TABLET BY MOUTH DAILY 100 Tablet 2    EPINEPHrine (EPIPEN) 0.3 MG/0.3ML Solution Auto-injector solution for injection INJECT INTO THIGH UTD 1 Each 0    Multiple Vitamins-Minerals (PRESERVISION AREDS PO) Take  by mouth.      Coenzyme Q10  "(CO Q 10 PO) Take  by mouth.      Fexofenadine HCl (ALLEGRA ALLERGY PO) Take  by mouth.      ELDERBERRY PO Take  by mouth.      vitamin D, Ergocalciferol, (DRISDOL) 1.25 MG (87515 UT) Cap capsule Take  by mouth every 7 days.      Flaxseed, Linseed, (FLAX SEED OIL PO) Take  by mouth.      Probiotic Product (PROBIOTIC-10 PO) Take  by mouth.      Semaglutide, 1 MG/DOSE, (OZEMPIC, 1 MG/DOSE,) 4 MG/3ML Solution Pen-injector Inject 1 mg under the skin every 7 days. 3 mL 0     No current Epic-ordered facility-administered medications on file.       Allergies:  Nitrofurantoin, Aspirin, Codeine, Fish, Maxepa, Peanut oil, and Tree nuts food allergy    Health Maintenance: Completed    Review of Systems:  No fevers or chills. No cough, chest pain, or shortness of breath.       Objective:       Exam:  /78   Pulse 70   Temp 36.6 °C (97.8 °F) (Temporal)   Ht 1.676 m (5' 6\")   Wt 123 kg (271 lb)   SpO2 96%   BMI 43.74 kg/m²  Body mass index is 43.74 kg/m².    Gen: Alert, no distress, well-groomed.  Eyes: Conjunctiva clear, lids normal. No icterus.   ENT: Lips pink without lesions, good dentition, moist mucous membranes. Phonation normal.  Neck: No visible thyromegaly or lymphadenopathy, moves freely without pain.  Lungs: Unlabored respiratory effort, no cough or audible wheeze.  Ext: No cyanosis, clubbing, erythema, or edema.   Skin: No rashes noted in visible areas.  Psych:  Behavior, mood, and affect are appropriate.        Assessment & Plan:     67 y.o. female with the following -     Morbid obesity with body mass index (BMI) of 40.0 to 44.9 in adult (HCC)  Primary osteoarthritis of left knee  Chronic, improving.  The patient was recently started on Ozempic to assist with weight loss and that she will be a candidate for left knee TKA.  She is doing well so far.  Her BMI at the beginning of her treatment was 43.79.  She needs to have a BMI of less than 40 to undergo surgery.  -Continue Ozempic advancing up to a dose " of 1 mg medically to assist with weight loss, the patient is also doing intermittent fasting and walking 50 to 70 miles monthly.    Hypothyroidism due to Hashimoto's thyroiditis  Chronic condition, controlled.  The patient was recently switched from NP thyroid 60 mg daily to levothyroxine 125 mcg daily as the NP thyroid was causing malodorous urine. Thyroid labs on 4/18/2023 showed a normal TSH of 1.02, normal free T4 of 1.6, and normal free T3 of 2.32.    -Continue levothyroxine 125 mcg daily    Return in about 3 months (around 7/13/2023) for 3-month f/u visit.    Please note that this dictation was created using voice recognition software. I have made every reasonable attempt to correct obvious errors, but I expect that there are errors of grammar and possibly content that I did not discover before finalizing the note.

## 2023-04-13 ENCOUNTER — APPOINTMENT (RX ONLY)
Dept: URBAN - METROPOLITAN AREA CLINIC 15 | Facility: CLINIC | Age: 68
Setting detail: DERMATOLOGY
End: 2023-04-13

## 2023-04-13 ENCOUNTER — OFFICE VISIT (OUTPATIENT)
Dept: MEDICAL GROUP | Facility: PHYSICIAN GROUP | Age: 68
End: 2023-04-13
Payer: MEDICARE

## 2023-04-13 VITALS
TEMPERATURE: 97.8 F | BODY MASS INDEX: 43.55 KG/M2 | SYSTOLIC BLOOD PRESSURE: 130 MMHG | DIASTOLIC BLOOD PRESSURE: 78 MMHG | HEIGHT: 66 IN | HEART RATE: 70 BPM | OXYGEN SATURATION: 96 % | WEIGHT: 271 LBS

## 2023-04-13 DIAGNOSIS — E03.8 HYPOTHYROIDISM DUE TO HASHIMOTO'S THYROIDITIS: ICD-10-CM

## 2023-04-13 DIAGNOSIS — L57.0 ACTINIC KERATOSIS: ICD-10-CM

## 2023-04-13 DIAGNOSIS — E66.01 MORBID OBESITY WITH BODY MASS INDEX (BMI) OF 40.0 TO 44.9 IN ADULT (HCC): ICD-10-CM

## 2023-04-13 DIAGNOSIS — E06.3 HYPOTHYROIDISM DUE TO HASHIMOTO'S THYROIDITIS: ICD-10-CM

## 2023-04-13 DIAGNOSIS — M17.12 PRIMARY OSTEOARTHRITIS OF LEFT KNEE: ICD-10-CM

## 2023-04-13 PROCEDURE — ? COUNSELING

## 2023-04-13 PROCEDURE — 99214 OFFICE O/P EST MOD 30 MIN: CPT | Performed by: INTERNAL MEDICINE

## 2023-04-13 PROCEDURE — ? LIQUID NITROGEN

## 2023-04-13 PROCEDURE — 3075F SYST BP GE 130 - 139MM HG: CPT | Performed by: INTERNAL MEDICINE

## 2023-04-13 PROCEDURE — 3078F DIAST BP <80 MM HG: CPT | Performed by: INTERNAL MEDICINE

## 2023-04-13 PROCEDURE — 17000 DESTRUCT PREMALG LESION: CPT

## 2023-04-13 RX ORDER — SEMAGLUTIDE 0.68 MG/ML
0.25 INJECTION, SOLUTION SUBCUTANEOUS
COMMUNITY
Start: 2023-03-30 | End: 2023-04-25 | Stop reason: SDUPTHER

## 2023-04-13 ASSESSMENT — LOCATION DETAILED DESCRIPTION DERM: LOCATION DETAILED: NASAL ROOT

## 2023-04-13 ASSESSMENT — LOCATION SIMPLE DESCRIPTION DERM: LOCATION SIMPLE: NOSE

## 2023-04-13 ASSESSMENT — LOCATION ZONE DERM: LOCATION ZONE: NOSE

## 2023-04-13 ASSESSMENT — FIBROSIS 4 INDEX: FIB4 SCORE: 1.08

## 2023-04-13 NOTE — HPI: SKIN LESION
Is This A New Presentation, Or A Follow-Up?: Skin Lesion
What Type Of Note Output Would You Prefer (Optional)?: Standard Output
How Severe Is Your Skin Lesion?: moderate
Has Your Skin Lesion Been Treated?: not been treated
Additional History: Applied oil to spot with no change.

## 2023-04-18 ENCOUNTER — HOSPITAL ENCOUNTER (OUTPATIENT)
Dept: LAB | Facility: MEDICAL CENTER | Age: 68
End: 2023-04-18
Attending: ORTHOPAEDIC SURGERY
Payer: MEDICARE

## 2023-04-18 ENCOUNTER — HOSPITAL ENCOUNTER (OUTPATIENT)
Dept: LAB | Facility: MEDICAL CENTER | Age: 68
End: 2023-04-18
Attending: INTERNAL MEDICINE
Payer: MEDICARE

## 2023-04-18 DIAGNOSIS — M17.12 PRIMARY OSTEOARTHRITIS OF LEFT KNEE: ICD-10-CM

## 2023-04-18 DIAGNOSIS — Z13.0 SCREENING FOR DEFICIENCY ANEMIA: ICD-10-CM

## 2023-04-18 DIAGNOSIS — Z13.1 ENCOUNTER FOR SCREENING FOR DIABETES MELLITUS: ICD-10-CM

## 2023-04-18 DIAGNOSIS — E03.8 HYPOTHYROIDISM DUE TO HASHIMOTO'S THYROIDITIS: ICD-10-CM

## 2023-04-18 DIAGNOSIS — E06.3 HYPOTHYROIDISM DUE TO HASHIMOTO'S THYROIDITIS: ICD-10-CM

## 2023-04-18 DIAGNOSIS — Z13.6 SCREENING FOR CARDIOVASCULAR CONDITION: ICD-10-CM

## 2023-04-18 DIAGNOSIS — E87.1 HYPONATREMIA: ICD-10-CM

## 2023-04-18 LAB
ALBUMIN SERPL BCP-MCNC: 4.2 G/DL (ref 3.2–4.9)
ALBUMIN/GLOB SERPL: 1.3 G/DL
ALP SERPL-CCNC: 109 U/L (ref 30–99)
ALT SERPL-CCNC: 20 U/L (ref 2–50)
ANION GAP SERPL CALC-SCNC: 12 MMOL/L (ref 7–16)
ANION GAP SERPL CALC-SCNC: 13 MMOL/L (ref 7–16)
APPEARANCE UR: CLEAR
APTT PPP: 30.4 SEC (ref 24.7–36)
AST SERPL-CCNC: 21 U/L (ref 12–45)
BASOPHILS # BLD AUTO: 0.7 % (ref 0–1.8)
BASOPHILS # BLD: 0.04 K/UL (ref 0–0.12)
BILIRUB SERPL-MCNC: 0.4 MG/DL (ref 0.1–1.5)
BILIRUB UR QL STRIP.AUTO: NEGATIVE
BUN SERPL-MCNC: 13 MG/DL (ref 8–22)
BUN SERPL-MCNC: 13 MG/DL (ref 8–22)
CALCIUM ALBUM COR SERPL-MCNC: 9.1 MG/DL (ref 8.5–10.5)
CALCIUM SERPL-MCNC: 9.1 MG/DL (ref 8.5–10.5)
CALCIUM SERPL-MCNC: 9.3 MG/DL (ref 8.5–10.5)
CHLORIDE SERPL-SCNC: 95 MMOL/L (ref 96–112)
CHLORIDE SERPL-SCNC: 96 MMOL/L (ref 96–112)
CHOLEST SERPL-MCNC: 173 MG/DL (ref 100–199)
CO2 SERPL-SCNC: 24 MMOL/L (ref 20–33)
CO2 SERPL-SCNC: 24 MMOL/L (ref 20–33)
COLOR UR: YELLOW
CREAT SERPL-MCNC: 0.42 MG/DL (ref 0.5–1.4)
CREAT SERPL-MCNC: 0.43 MG/DL (ref 0.5–1.4)
EOSINOPHIL # BLD AUTO: 0.12 K/UL (ref 0–0.51)
EOSINOPHIL NFR BLD: 2.1 % (ref 0–6.9)
ERYTHROCYTE [DISTWIDTH] IN BLOOD BY AUTOMATED COUNT: 46.6 FL (ref 35.9–50)
FASTING STATUS PATIENT QL REPORTED: NORMAL
FASTING STATUS PATIENT QL REPORTED: NORMAL
GFR SERPLBLD CREATININE-BSD FMLA CKD-EPI: 105 ML/MIN/1.73 M 2
GFR SERPLBLD CREATININE-BSD FMLA CKD-EPI: 105 ML/MIN/1.73 M 2
GLOBULIN SER CALC-MCNC: 3.2 G/DL (ref 1.9–3.5)
GLUCOSE SERPL-MCNC: 84 MG/DL (ref 65–99)
GLUCOSE SERPL-MCNC: 85 MG/DL (ref 65–99)
GLUCOSE UR STRIP.AUTO-MCNC: NEGATIVE MG/DL
HCT VFR BLD AUTO: 44.2 % (ref 37–47)
HDLC SERPL-MCNC: 70 MG/DL
HGB BLD-MCNC: 14.6 G/DL (ref 12–16)
HIV 1+2 AB+HIV1 P24 AG SERPL QL IA: NORMAL
IMM GRANULOCYTES # BLD AUTO: 0.03 K/UL (ref 0–0.11)
IMM GRANULOCYTES NFR BLD AUTO: 0.5 % (ref 0–0.9)
INR PPP: 1.03 (ref 0.87–1.13)
KETONES UR STRIP.AUTO-MCNC: NEGATIVE MG/DL
LDLC SERPL CALC-MCNC: 95 MG/DL
LEUKOCYTE ESTERASE UR QL STRIP.AUTO: NEGATIVE
LYMPHOCYTES # BLD AUTO: 1.68 K/UL (ref 1–4.8)
LYMPHOCYTES NFR BLD: 28.9 % (ref 22–41)
MCH RBC QN AUTO: 30.4 PG (ref 27–33)
MCHC RBC AUTO-ENTMCNC: 33 G/DL (ref 33.6–35)
MCV RBC AUTO: 92.1 FL (ref 81.4–97.8)
MICRO URNS: NORMAL
MONOCYTES # BLD AUTO: 0.45 K/UL (ref 0–0.85)
MONOCYTES NFR BLD AUTO: 7.7 % (ref 0–13.4)
NEUTROPHILS # BLD AUTO: 3.5 K/UL (ref 2–7.15)
NEUTROPHILS NFR BLD: 60.1 % (ref 44–72)
NITRITE UR QL STRIP.AUTO: NEGATIVE
NRBC # BLD AUTO: 0 K/UL
NRBC BLD-RTO: 0 /100 WBC
PH UR STRIP.AUTO: 7 [PH] (ref 5–8)
PLATELET # BLD AUTO: 295 K/UL (ref 164–446)
PMV BLD AUTO: 10.3 FL (ref 9–12.9)
POTASSIUM SERPL-SCNC: 4.2 MMOL/L (ref 3.6–5.5)
POTASSIUM SERPL-SCNC: 4.2 MMOL/L (ref 3.6–5.5)
PROT SERPL-MCNC: 7.4 G/DL (ref 6–8.2)
PROT UR QL STRIP: NEGATIVE MG/DL
PROTHROMBIN TIME: 13.3 SEC (ref 12–14.6)
RBC # BLD AUTO: 4.8 M/UL (ref 4.2–5.4)
RBC UR QL AUTO: NEGATIVE
SODIUM SERPL-SCNC: 131 MMOL/L (ref 135–145)
SODIUM SERPL-SCNC: 133 MMOL/L (ref 135–145)
SP GR UR STRIP.AUTO: 1.01
T3FREE SERPL-MCNC: 2.32 PG/ML (ref 2–4.4)
T4 FREE SERPL-MCNC: 1.6 NG/DL (ref 0.93–1.7)
TRIGL SERPL-MCNC: 41 MG/DL (ref 0–149)
TSH SERPL DL<=0.005 MIU/L-ACNC: 1.02 UIU/ML (ref 0.38–5.33)
UROBILINOGEN UR STRIP.AUTO-MCNC: 0.2 MG/DL
WBC # BLD AUTO: 5.8 K/UL (ref 4.8–10.8)

## 2023-04-18 PROCEDURE — 85610 PROTHROMBIN TIME: CPT

## 2023-04-18 PROCEDURE — 81003 URINALYSIS AUTO W/O SCOPE: CPT

## 2023-04-18 PROCEDURE — 36415 COLL VENOUS BLD VENIPUNCTURE: CPT

## 2023-04-18 PROCEDURE — 85730 THROMBOPLASTIN TIME PARTIAL: CPT

## 2023-04-18 PROCEDURE — 80061 LIPID PANEL: CPT

## 2023-04-18 PROCEDURE — 84443 ASSAY THYROID STIM HORMONE: CPT

## 2023-04-18 PROCEDURE — 85025 COMPLETE CBC W/AUTO DIFF WBC: CPT

## 2023-04-18 PROCEDURE — 84439 ASSAY OF FREE THYROXINE: CPT

## 2023-04-18 PROCEDURE — 80048 BASIC METABOLIC PNL TOTAL CA: CPT

## 2023-04-18 PROCEDURE — 87389 HIV-1 AG W/HIV-1&-2 AB AG IA: CPT

## 2023-04-18 PROCEDURE — 84481 FREE ASSAY (FT-3): CPT

## 2023-04-18 PROCEDURE — 80053 COMPREHEN METABOLIC PANEL: CPT

## 2023-05-16 ENCOUNTER — PATIENT MESSAGE (OUTPATIENT)
Dept: HEALTH INFORMATION MANAGEMENT | Facility: OTHER | Age: 68
End: 2023-05-16

## 2023-05-16 ENCOUNTER — DOCUMENTATION (OUTPATIENT)
Dept: HEALTH INFORMATION MANAGEMENT | Facility: OTHER | Age: 68
End: 2023-05-16
Payer: MEDICARE

## 2023-05-26 DIAGNOSIS — E66.01 MORBID OBESITY WITH BODY MASS INDEX (BMI) OF 40.0 TO 44.9 IN ADULT (HCC): ICD-10-CM

## 2023-05-26 RX ORDER — SEMAGLUTIDE 1.34 MG/ML
1 INJECTION, SOLUTION SUBCUTANEOUS
Qty: 3 ML | Refills: 0 | Status: SHIPPED | OUTPATIENT
Start: 2023-05-26 | End: 2023-06-27 | Stop reason: SDUPTHER

## 2023-05-26 NOTE — PROGRESS NOTES
1. Morbid obesity with body mass index (BMI) of 40.0 to 44.9 in adult (HCC)  - Semaglutide, 1 MG/DOSE, (OZEMPIC, 1 MG/DOSE,) 4 MG/3ML Solution Pen-injector; Inject 1 mg under the skin every 7 days.  Dispense: 3 mL; Refill: 0

## 2023-06-12 ENCOUNTER — APPOINTMENT (RX ONLY)
Dept: URBAN - METROPOLITAN AREA CLINIC 22 | Facility: CLINIC | Age: 68
Setting detail: DERMATOLOGY
End: 2023-06-12

## 2023-06-12 DIAGNOSIS — D22 MELANOCYTIC NEVI: ICD-10-CM

## 2023-06-12 DIAGNOSIS — L81.4 OTHER MELANIN HYPERPIGMENTATION: ICD-10-CM

## 2023-06-12 DIAGNOSIS — L82.1 OTHER SEBORRHEIC KERATOSIS: ICD-10-CM

## 2023-06-12 DIAGNOSIS — Z71.89 OTHER SPECIFIED COUNSELING: ICD-10-CM

## 2023-06-12 DIAGNOSIS — L57.0 ACTINIC KERATOSIS: ICD-10-CM

## 2023-06-12 PROBLEM — D48.5 NEOPLASM OF UNCERTAIN BEHAVIOR OF SKIN: Status: ACTIVE | Noted: 2023-06-12

## 2023-06-12 PROBLEM — D22.5 MELANOCYTIC NEVI OF TRUNK: Status: ACTIVE | Noted: 2023-06-12

## 2023-06-12 PROCEDURE — 11102 TANGNTL BX SKIN SINGLE LES: CPT

## 2023-06-12 PROCEDURE — ? SUNSCREEN TREATMENT REGIMEN

## 2023-06-12 PROCEDURE — ? COUNSELING

## 2023-06-12 PROCEDURE — ? BIOPSY BY SHAVE METHOD

## 2023-06-12 PROCEDURE — 17000 DESTRUCT PREMALG LESION: CPT | Mod: 59

## 2023-06-12 PROCEDURE — 99213 OFFICE O/P EST LOW 20 MIN: CPT | Mod: 25

## 2023-06-12 PROCEDURE — ? LIQUID NITROGEN

## 2023-06-12 ASSESSMENT — LOCATION ZONE DERM
LOCATION ZONE: LEG
LOCATION ZONE: ARM
LOCATION ZONE: TRUNK
LOCATION ZONE: FACE

## 2023-06-12 ASSESSMENT — LOCATION DETAILED DESCRIPTION DERM
LOCATION DETAILED: SUPERIOR THORACIC SPINE
LOCATION DETAILED: INFERIOR THORACIC SPINE
LOCATION DETAILED: LEFT POPLITEAL SKIN
LOCATION DETAILED: LEFT VENTRAL PROXIMAL FOREARM
LOCATION DETAILED: LEFT LATERAL PROXIMAL PRETIBIAL REGION
LOCATION DETAILED: PERIUMBILICAL SKIN
LOCATION DETAILED: GLABELLA
LOCATION DETAILED: RIGHT PROXIMAL CALF
LOCATION DETAILED: INFERIOR MID FOREHEAD
LOCATION DETAILED: RIGHT VENTRAL PROXIMAL FOREARM

## 2023-06-12 ASSESSMENT — LOCATION SIMPLE DESCRIPTION DERM
LOCATION SIMPLE: INFERIOR FOREHEAD
LOCATION SIMPLE: LEFT FOREARM
LOCATION SIMPLE: LEFT POPLITEAL SKIN
LOCATION SIMPLE: GLABELLA
LOCATION SIMPLE: ABDOMEN
LOCATION SIMPLE: RIGHT CALF
LOCATION SIMPLE: LEFT PRETIBIAL REGION
LOCATION SIMPLE: UPPER BACK
LOCATION SIMPLE: RIGHT FOREARM

## 2023-06-12 NOTE — PROCEDURE: BIOPSY BY SHAVE METHOD
Detail Level: Detailed
Depth Of Biopsy: dermis
Was A Bandage Applied: Yes
Size Of Lesion In Cm: 0.4
X Size Of Lesion In Cm: 0
Biopsy Type: H and E
Biopsy Method: Personna blade
Anesthesia Type: 1% lidocaine without epinephrine
Anesthesia Volume In Cc: 1
Hemostasis: Aluminum Chloride
Wound Care: Petrolatum
Dressing: pressure dressing with telfa
Destruction After The Procedure: No
Type Of Destruction Used: Curettage
Curettage Text: The wound bed was treated with curettage after the biopsy was performed.
Cryotherapy Text: The wound bed was treated with cryotherapy after the biopsy was performed.
Electrodesiccation Text: The wound bed was treated with electrodesiccation after the biopsy was performed.
Electrodesiccation And Curettage Text: The wound bed was treated with electrodesiccation and curettage after the biopsy was performed.
Silver Nitrate Text: The wound bed was treated with silver nitrate after the biopsy was performed.
Lab: [id number]
Lab Facility: 
Consent: Written consent was obtained and risks were reviewed including but not limited to scarring, infection, bleeding, scabbing, incomplete removal, nerve damage and allergy to anesthesia.
Post-Care Instructions: I reviewed with the patient in detail post-care instructions. Patient is to keep the biopsy site dry overnight. Gentle cleansing daily.  Apply petroleum ointment daily until healed. Patient may apply hydrogen peroxide soaks to remove any crusting.
Notification Instructions: Patient will be notified of biopsy results. However, patient instructed to call the office if not contacted within 2 weeks.
Billing Type: Third-Party Bill
Information: Selecting Yes will display possible errors in your note based on the variables you have selected. This validation is only offered as a suggestion for you. PLEASE NOTE THAT THE VALIDATION TEXT WILL BE REMOVED WHEN YOU FINALIZE YOUR NOTE. IF YOU WANT TO FAX A PRELIMINARY NOTE YOU WILL NEED TO TOGGLE THIS TO 'NO' IF YOU DO NOT WANT IT IN YOUR FAXED NOTE.

## 2023-06-12 NOTE — PROCEDURE: LIQUID NITROGEN
Detail Level: Detailed
Duration Of Freeze Thaw-Cycle (Seconds): 0
Show Aperture Variable?: Yes
Number Of Freeze-Thaw Cycles: 2 freeze-thaw cycles
Render Post-Care Instructions In Note?: no
Consent: The patient's consent was obtained including but not limited to risks of crusting, scabbing, blistering, scarring, darker or lighter pigmentary change, recurrence, incomplete removal and infection.
Post-Care Instructions: I reviewed with the patient in detail post-care instructions. Patient is to wear sunprotection, and avoid picking at any of the treated lesions. Pt may apply Vaseline to crusted or scabbing areas.

## 2023-06-27 DIAGNOSIS — E66.01 MORBID OBESITY WITH BODY MASS INDEX (BMI) OF 40.0 TO 44.9 IN ADULT (HCC): ICD-10-CM

## 2023-06-28 RX ORDER — SEMAGLUTIDE 1.34 MG/ML
1 INJECTION, SOLUTION SUBCUTANEOUS
Qty: 3 ML | Refills: 3 | Status: SHIPPED | OUTPATIENT
Start: 2023-06-28 | End: 2023-10-20

## 2023-06-29 ENCOUNTER — OFFICE VISIT (OUTPATIENT)
Dept: SLEEP MEDICINE | Facility: MEDICAL CENTER | Age: 68
End: 2023-06-29
Attending: PHYSICIAN ASSISTANT
Payer: MEDICARE

## 2023-06-29 VITALS
SYSTOLIC BLOOD PRESSURE: 122 MMHG | BODY MASS INDEX: 45.75 KG/M2 | DIASTOLIC BLOOD PRESSURE: 80 MMHG | HEIGHT: 64 IN | OXYGEN SATURATION: 95 % | WEIGHT: 268 LBS | RESPIRATION RATE: 16 BRPM | HEART RATE: 80 BPM

## 2023-06-29 DIAGNOSIS — E66.01 MORBID OBESITY WITH BODY MASS INDEX (BMI) OF 40.0 TO 49.9 (HCC): ICD-10-CM

## 2023-06-29 DIAGNOSIS — G47.33 OSA (OBSTRUCTIVE SLEEP APNEA): ICD-10-CM

## 2023-06-29 PROCEDURE — 99212 OFFICE O/P EST SF 10 MIN: CPT | Performed by: PHYSICIAN ASSISTANT

## 2023-06-29 PROCEDURE — 3074F SYST BP LT 130 MM HG: CPT | Performed by: PHYSICIAN ASSISTANT

## 2023-06-29 PROCEDURE — 3079F DIAST BP 80-89 MM HG: CPT | Performed by: PHYSICIAN ASSISTANT

## 2023-06-29 PROCEDURE — 99213 OFFICE O/P EST LOW 20 MIN: CPT | Performed by: PHYSICIAN ASSISTANT

## 2023-06-29 ASSESSMENT — ENCOUNTER SYMPTOMS
INSOMNIA: 1
ROS GI COMMENTS: NO DENTURES, NO SWALLOWING ISSUES
WHEEZING: 0
SHORTNESS OF BREATH: 0
CHILLS: 0
COUGH: 0
SPUTUM PRODUCTION: 0
WEIGHT LOSS: 0
TREMORS: 0
SORE THROAT: 0
FEVER: 0
ORTHOPNEA: 0
HEARTBURN: 0
HEADACHES: 0
SINUS PAIN: 0
DIZZINESS: 0
PALPITATIONS: 0

## 2023-06-29 ASSESSMENT — FIBROSIS 4 INDEX: FIB4 SCORE: 1.1

## 2023-06-29 NOTE — PATIENT INSTRUCTIONS
1-Today we reviewed equipment cleaning  once weekly minimum  mask, tubing and water chamber  use dedicated container  use mild soap and water  SoClean or other ozone  are not recommended  white vinegar and water solution is no longer recommended  hang tubing to dry  mask sanitizing wipes are an option for use   2-As a reminder use distilled water only in humidifier chamber.  3-Equipment replacement schedule : Mask cushion every month, Head gear every 6 months, Tubing every 3 months, Ultra-fine filters 2 times per month, Humidifier chamber every 6 months     4-reviewed compliance which is excellent  5-no pressure changes advised at this time  6-follow up in one year

## 2023-06-29 NOTE — PROGRESS NOTES
"Chief Complaint   Patient presents with    Apnea     Routine    Follow-Up     06/13/22 Ian       HPI:  Adelaide Pereira is a 67 y.o. year old female here today for follow-up on obstructive sleep apnea.  Patient previously seen in clinic by Dr. Louis Sosa 6/13/2022.    Past Medical History: Includes hypertension, goiter, chronic thyroiditis, binge eating, food allergies, multiple thyroid nodules, hypothyroidism due to Hashimoto's thyroiditis.    Vitals:  /80   Pulse 80   Resp 16   Ht 1.626 m (5' 4\")   Wt 122 kg (268 lb)   SpO2 95%     Recent chest imaging: None    Currently using  Resmed CPAP @ 14 cm H20 pressure; compliance reviewed for 5/30/2023 through 6/28/2023, days used 30/30, average daily usage 8 hours 39 minutes, 100% of days greater than or equal to 4 hours, mask leak at 27.2 LPM at 95th percentile, AHI 0.1 per hour.    Device obtained 2018  [facility] [place] provider Preferred  Mask interface nasal mask    Polysomnogram obtained 10/17/2011 demonstrating severe RAISA with overall AHI 36.9/h increasing to 70/h during REM.  Low O2 sat of 62%.      Sleep schedule goes to bed 9 PM, wakens 5 AM , and gets up during the night once to use restroom   Symptoms denies daytime somnolence, morning headache     Stop Bang Score 6 (6/30/2023 12:50 PM)           Review of Systems   Constitutional:  Negative for chills, fever, malaise/fatigue and weight loss.   HENT:  Positive for congestion (occasional due to allergies), hearing loss (hearing aids in January 2023) and tinnitus (intermittent). Negative for nosebleeds, sinus pain and sore throat.    Respiratory:  Negative for cough, sputum production, shortness of breath and wheezing.    Cardiovascular:  Positive for leg swelling (sometimes). Negative for chest pain, palpitations and orthopnea.   Gastrointestinal:  Negative for heartburn.        No dentures, no swallowing issues    Neurological:  Negative for dizziness, tremors and headaches.   Psychiatric/Behavioral:  " The patient has insomnia (occasional falling asleep).        Past Medical History:   Diagnosis Date    Chickenpox     [place] measles     Hyperlipidemia     Hypothyroidism     Influenza     Mumps     Obesity     Sleep apnea 2011    on CPAP    thyroid nodules 2000    biopsy Hashimoto's thyroiditis / TPO ab neg       Past Surgical History:   Procedure Laterality Date    PRIMARY C SECTION         Family History   Problem Relation Age of Onset    Thyroid Mother     Cancer Mother         breast    Heart Attack Father     Sleep Apnea Father     Arterial Aneurysm Father     Sleep Apnea Brother     Cancer Maternal Aunt         breast    No Known Problems Sister     Diabetes Maternal Grandmother     No Known Problems Son     Thyroid Daughter        Social History     Socioeconomic History    Marital status:      Spouse name: Not on file    Number of children: Not on file    Years of education: Not on file    Highest education level: Master's degree (e.g., MA, MS, Manuel, MEd, MSW, KATHERINE)   Occupational History    Not on file   Tobacco Use    Smoking status: Never    Smokeless tobacco: Never   Vaping Use    Vaping Use: Never used   Substance and Sexual Activity    Alcohol use: Yes     Comment: Occasionally     Drug use: No    Sexual activity: Not Currently   Other Topics Concern    Not on file   Social History Narrative    Not on file     Social Determinants of Health     Financial Resource Strain: Low Risk  (3/19/2023)    Overall Financial Resource Strain (CARDIA)     Difficulty of Paying Living Expenses: Not very hard   Food Insecurity: No Food Insecurity (3/19/2023)    Hunger Vital Sign     Worried About Running Out of Food in the Last Year: Never true     Ran Out of Food in the Last Year: Never true   Transportation Needs: No Transportation Needs (3/19/2023)    PRAPARE - Transportation     Lack of Transportation (Medical): No     Lack of Transportation (Non-Medical): No   Physical Activity: Sufficiently Active  (3/19/2023)    Exercise Vital Sign     Days of Exercise per Week: 4 days     Minutes of Exercise per Session: 120 min   Stress: Stress Concern Present (3/19/2023)    [employer] - Occupational Stress Questionnaire     Feeling of Stress : To some extent   Social Connections: Moderately Integrated (3/19/2023)    Social Connection and Isolation Panel [NHANES]     Frequency of Communication with Friends and Family: More than three times a week     Frequency of Social Gatherings with Friends and Family: More than three times a week     Attends [employer] Services: More than 4 times per year     Active Member of Clubs or Organizations: Yes     Attends Club or Organization Meetings: More than 4 times per year     Marital Status:    Intimate Partner Violence: Not on file   Housing Stability: Low Risk  (3/19/2023)    Housing Stability Vital Sign     Unable to Pay for Housing in the Last Year: No     Number of Places Lived in the Last Year: 1     Unstable Housing in the Last Year: No       Allergies as of 06/29/2023 - Reviewed 06/29/2023   Allergen Reaction Noted    Nitrofurantoin Rash 06/22/2020    Aspirin  06/24/2017    Codeine  06/24/2017    Fish  06/24/2017    Maxepa  03/29/2010    Peanut oil  03/29/2010    Tree nuts food allergy Anaphylaxis 10/21/2021          Current medications as of today   Current Outpatient Medications   Medication Sig Dispense Refill    Semaglutide, 1 MG/DOSE, (OZEMPIC, 1 MG/DOSE,) 4 MG/3ML Solution Pen-injector Inject 1 mg under the skin every 7 days. 3 mL 3    levothyroxine (SYNTHROID) 125 MCG Tab Take 1 Tablet by mouth every morning on an empty stomach. 90 Tablet 3    triamterene-hctz (MAXZIDE-25/DYAZIDE) 37.5-25 MG Tab TAKE 1/2 TABLET BY MOUTH DAILY 100 Tablet 2    EPINEPHrine (EPIPEN) 0.3 MG/0.3ML Solution Auto-injector solution for injection INJECT INTO THIGH UTD 1 Each 0    Multiple Vitamins-Minerals (PRESERVISION AREDS PO) Take  by mouth.      Coenzyme Q10  (CO Q 10 PO) Take  by mouth.      Fexofenadine HCl (ALLEGRA ALLERGY PO) Take  by mouth.      ELDERBERRY PO Take  by mouth.      vitamin D, Ergocalciferol, (DRISDOL) 1.25 MG (11452 UT) Cap capsule Take  by mouth every 7 days.      Flaxseed, Linseed, (FLAX SEED OIL PO) Take  by mouth.      Probiotic Product (PROBIOTIC-10 PO) Take  by mouth.       No current facility-administered medications for this visit.         Physical Exam:   Gen:           Alert and oriented, No apparent distress. Mood and affect appropriate, normal interaction with examiner.   Hearing:     Grossly intact.  Nose:          Normal, no lesions or deformities.  Dentition:    Fair dentition.   Oropharynx:   Tongue normal, posterior pharynx without erythema or exudate.  Mallampati Classification: III  Neck:        Supple, trachea midline, no masses.  Respiratory Effort: No intercostal retractions or use of accessory muscles.   Gait and Station: Normal.  Digits and Nails: No clubbing, cyanosis, petechiae, or nodes.   Skin:        No rashes, lesions or ulcers noted.               Ext:           No cyanosis or edema.      Immunizations:  Flu: Declined  Pneumovax 23: 7/8/2020  Prevnar 13: 5/15/2020  SARS CoV2 Vaccine: Declined    Assessment / Plan:    1. RAISA (obstructive sleep apnea)  - DME Mask and Supplies    Reviewed equipment cleaning recommendations, patient reminded to use only distilled water and humidifier chamber.  Also reviewed recommendations for equipment and supply replacement.  Reviewed patient compliance which is excellent.  She is experiencing a moderate mask leak and is overdue for equipment change.  No pressure changes were advised at this time.  We will send updated mask and supply order to preferred home care.    2. Morbid obesity with body mass index (BMI) of 40.0 to 49.9 in adult (HCC)     Discussion regarding impact central adiposity on pulmonary function.  Encouraged to increase activity as tolerated and monitor nutritional intake.         Follow-up:   Return in about 1 year (around 6/29/2024) for Return with Sveta Macdonald PA-C.    Please note that this dictation was created using voice recognition software. I have made every reasonable attempt to correct obvious errors, but it is possible there are errors of grammar and possibly content that I did not discover before finalizing the note.

## 2023-07-13 ENCOUNTER — OFFICE VISIT (OUTPATIENT)
Dept: MEDICAL GROUP | Facility: PHYSICIAN GROUP | Age: 68
End: 2023-07-13
Payer: MEDICARE

## 2023-07-13 VITALS
HEIGHT: 65 IN | HEART RATE: 79 BPM | TEMPERATURE: 98.7 F | SYSTOLIC BLOOD PRESSURE: 124 MMHG | WEIGHT: 266.1 LBS | OXYGEN SATURATION: 96 % | BODY MASS INDEX: 44.33 KG/M2 | DIASTOLIC BLOOD PRESSURE: 82 MMHG

## 2023-07-13 DIAGNOSIS — E04.2 MULTIPLE THYROID NODULES: ICD-10-CM

## 2023-07-13 DIAGNOSIS — E03.8 HYPOTHYROIDISM DUE TO HASHIMOTO'S THYROIDITIS: ICD-10-CM

## 2023-07-13 DIAGNOSIS — E66.01 MORBID OBESITY WITH BODY MASS INDEX (BMI) OF 40.0 TO 44.9 IN ADULT (HCC): ICD-10-CM

## 2023-07-13 DIAGNOSIS — Z13.0 SCREENING FOR DEFICIENCY ANEMIA: ICD-10-CM

## 2023-07-13 DIAGNOSIS — I10 PRIMARY HYPERTENSION: ICD-10-CM

## 2023-07-13 DIAGNOSIS — Z13.6 SCREENING FOR CARDIOVASCULAR CONDITION: ICD-10-CM

## 2023-07-13 DIAGNOSIS — E87.1 HYPONATREMIA: ICD-10-CM

## 2023-07-13 DIAGNOSIS — E06.3 HYPOTHYROIDISM DUE TO HASHIMOTO'S THYROIDITIS: ICD-10-CM

## 2023-07-13 PROCEDURE — 99214 OFFICE O/P EST MOD 30 MIN: CPT | Performed by: INTERNAL MEDICINE

## 2023-07-13 PROCEDURE — 3074F SYST BP LT 130 MM HG: CPT | Performed by: INTERNAL MEDICINE

## 2023-07-13 PROCEDURE — 3079F DIAST BP 80-89 MM HG: CPT | Performed by: INTERNAL MEDICINE

## 2023-07-13 ASSESSMENT — FIBROSIS 4 INDEX: FIB4 SCORE: 1.1

## 2023-07-20 ENCOUNTER — HOSPITAL ENCOUNTER (OUTPATIENT)
Dept: RADIOLOGY | Facility: MEDICAL CENTER | Age: 68
End: 2023-07-20
Attending: INTERNAL MEDICINE
Payer: MEDICARE

## 2023-07-20 DIAGNOSIS — Z12.31 VISIT FOR SCREENING MAMMOGRAM: ICD-10-CM

## 2023-07-20 PROCEDURE — 77063 BREAST TOMOSYNTHESIS BI: CPT

## 2023-08-03 ENCOUNTER — APPOINTMENT (RX ONLY)
Dept: URBAN - METROPOLITAN AREA CLINIC 15 | Facility: CLINIC | Age: 68
Setting detail: DERMATOLOGY
End: 2023-08-03

## 2023-08-03 PROBLEM — C44.622 SQUAMOUS CELL CARCINOMA OF SKIN OF RIGHT UPPER LIMB, INCLUDING SHOULDER: Status: ACTIVE | Noted: 2023-08-03

## 2023-08-03 PROCEDURE — 99212 OFFICE O/P EST SF 10 MIN: CPT

## 2023-08-03 PROCEDURE — ? ADDITIONAL NOTES

## 2023-08-03 PROCEDURE — ? COUNSELING

## 2023-08-03 PROCEDURE — ? DEFER

## 2023-08-03 NOTE — PROCEDURE: DEFER
Instructions (Optional): Will schedule to occur after her knee replacement surgery
X Size Of Lesion In Cm (Optional): 0
Introduction Text (Please End With A Colon): Excision:
Detail Level: Detailed

## 2023-08-03 NOTE — PROCEDURE: ADDITIONAL NOTES
Additional Notes: Pt has upcoming knee replacement surgery and has been advised to not have invasive procedures a week before. Discussed topical options. Pt will have excision
Render Risk Assessment In Note?: no
Detail Level: Detailed

## 2023-08-10 ENCOUNTER — HOSPITAL ENCOUNTER (OUTPATIENT)
Dept: RADIOLOGY | Facility: MEDICAL CENTER | Age: 68
End: 2023-08-10
Attending: INTERNAL MEDICINE
Payer: MEDICARE

## 2023-08-10 DIAGNOSIS — E06.5 CHRONIC THYROIDITIS: ICD-10-CM

## 2023-08-10 PROCEDURE — 76536 US EXAM OF HEAD AND NECK: CPT

## 2023-08-31 ENCOUNTER — APPOINTMENT (RX ONLY)
Dept: URBAN - METROPOLITAN AREA CLINIC 15 | Facility: CLINIC | Age: 68
Setting detail: DERMATOLOGY
End: 2023-08-31

## 2023-08-31 PROBLEM — C44.622 SQUAMOUS CELL CARCINOMA OF SKIN OF RIGHT UPPER LIMB, INCLUDING SHOULDER: Status: ACTIVE | Noted: 2023-08-31

## 2023-08-31 PROCEDURE — 12032 INTMD RPR S/A/T/EXT 2.6-7.5: CPT

## 2023-08-31 PROCEDURE — ? EXCISION

## 2023-08-31 PROCEDURE — 11602 EXC TR-EXT MAL+MARG 1.1-2 CM: CPT

## 2023-08-31 NOTE — PROCEDURE: EXCISION
Biopsy Photograph Reviewed: Yes
Accession #: [id number]
Size Of Lesion In Cm: 0.7
X Size Of Lesion In Cm (Optional): 0.6
Size Of Margin In Cm: 0.3
Anesthesia Volume In Cc: 3
Was An Eye Clamp Used?: No
Eye Clamp Note Details: An eye clamp was used during the procedure.
Excision Method: Elliptical
Saucerization Depth: dermis and superficial adipose tissue
Repair Type: Intermediate
Suturegard Retention Suture: 2-0 Nylon
Retention Suture Bite Size: 3 mm
Length To Time In Minutes Device Was In Place: 10
Number Of Hemigard Strips Per Side: 1
Undermining Type: Entire Wound
Debridement Text: The wound edges were debrided prior to proceeding with the closure to facilitate wound healing.
Helical Rim Text: The closure involved the helical rim.
Vermilion Border Text: The closure involved the vermilion border.
Nostril Rim Text: The closure involved the nostril rim.
Retention Suture Text: Retention sutures were placed to support the closure and prevent dehiscence.
Primary Defect Width (In Cm): 0
Suture Removal: 14 days
Lab: [id number]
Lab Facility: 
Graft Donor Site Bandage (Optional-Leave Blank If You Don't Want In Note): Steri-strips and a pressure bandage were applied to the donor site.
Epidermal Closure Graft Donor Site (Optional): simple interrupted
Billing Type: Third-Party Bill
Excision Depth: adipose tissue
Scalpel Size: 15 blade
Anesthesia Type: 1% lidocaine with epinephrine and a 1:10 solution of 8.4% sodium bicarbonate
Additional Anesthesia Volume In Cc: 6
Hemostasis: Pressure and Electrodesiccation
Estimated Blood Loss (Cc): minimal
Detail Level: Detailed
Deep Sutures: 4-0 Maxon
Epidermal Sutures: 4-0 Surgipro
Epidermal Closure: running
Wound Care: Petrolatum
Dressing: pressure dressing with telfa
Suturegard Intro: Intraoperative tissue expansion was performed, utilizing the SUTUREGARD device, in order to reduce wound tension.
Suturegard Body: The suture ends were repeatedly re-tightened and re-clamped to achieve the desired tissue expansion.
Hemigard Intro: Due to skin fragility and wound tension, it was decided to use HEMIGARD adhesive retention suture devices to permit a linear closure. The skin was cleaned and dried for a 6cm distance away from the wound. Excessive hair, if present, was removed to allow for adhesion.
Hemigard Postcare Instructions: The HEMIGARD strips are to remain completely dry for at least 5-7 days.
Positioning (Leave Blank If You Do Not Want): The patient was placed in a comfortable position exposing the surgical site.
Pre-Excision Curettage Text (Leave Blank If You Do Not Want): Prior to drawing the surgical margin the visible lesion was removed with electrodesiccation and curettage to clearly define the lesion size.
Complex Repair Preamble Text (Leave Blank If You Do Not Want): Extensive wide undermining was performed.
Intermediate Repair Preamble Text (Leave Blank If You Do Not Want): Undermining was performed with blunt dissection.
Curvilinear Excision Additional Text (Leave Blank If You Do Not Want): The margin was drawn around the clinically apparent lesion.  A curvilinear shape was then drawn on the skin incorporating the lesion and margins.  Incisions were then made along these lines to the appropriate tissue plane and the lesion was extirpated.
Fusiform Excision Additional Text (Leave Blank If You Do Not Want): The margin was drawn around the clinically apparent lesion.  A fusiform shape was then drawn on the skin incorporating the lesion and margins.  Incisions were then made along these lines to the appropriate tissue plane and the lesion was extirpated.
Elliptical Excision Additional Text (Leave Blank If You Do Not Want): The margin was drawn around the clinically apparent lesion.  An elliptical shape was then drawn on the skin incorporating the lesion and margins.  Incisions were then made along these lines to the appropriate tissue plane and the lesion was extirpated.
Saucerization Excision Additional Text (Leave Blank If You Do Not Want): The margin was drawn around the clinically apparent lesion.  Incisions were then made along these lines, in a tangential fashion, to the appropriate tissue plane and the lesion was extirpated.
Slit Excision Additional Text (Leave Blank If You Do Not Want): A linear line was drawn on the skin overlying the lesion. An incision was made slowly until the lesion was visualized.  Once visualized, the lesion was removed with blunt dissection.
Excisional Biopsy Additional Text (Leave Blank If You Do Not Want): The margin was drawn around the clinically apparent lesion. An elliptical shape was then drawn on the skin incorporating the lesion and margins.  Incisions were then made along these lines to the appropriate tissue plane and the lesion was extirpated.
Perilesional Excision Additional Text (Leave Blank If You Do Not Want): The margin was drawn around the clinically apparent lesion. Incisions were then made along these lines to the appropriate tissue plane and the lesion was extirpated.
Repair Performed By Another Provider Text (Leave Blank If You Do Not Want): After the tissue was excised the defect was repaired by another provider.
No Repair - Repaired With Adjacent Surgical Defect Text (Leave Blank If You Do Not Want): After the excision the defect was repaired concurrently with another surgical defect which was in close approximation.
Adjacent Tissue Transfer Text: The defect edges were debeveled with a #15 scalpel blade.  Given the location of the defect and the proximity to free margins an adjacent tissue transfer was deemed most appropriate.  Using a sterile surgical marker, an appropriate flap was drawn incorporating the defect and placing the expected incisions within the relaxed skin tension lines where possible.    The area thus outlined was incised deep to adipose tissue with a #15 scalpel blade.  The skin margins were undermined to an appropriate distance in all directions utilizing iris scissors.
Advancement Flap (Single) Text: The defect edges were debeveled with a #15 scalpel blade.  Given the location of the defect and the proximity to free margins a single advancement flap was deemed most appropriate.  Using a sterile surgical marker, an appropriate advancement flap was drawn incorporating the defect and placing the expected incisions within the relaxed skin tension lines where possible.    The area thus outlined was incised deep to adipose tissue with a #15 scalpel blade.  The skin margins were undermined to an appropriate distance in all directions utilizing iris scissors.
Advancement Flap (Double) Text: The defect edges were debeveled with a #15 scalpel blade.  Given the location of the defect and the proximity to free margins a double advancement flap was deemed most appropriate.  Using a sterile surgical marker, the appropriate advancement flaps were drawn incorporating the defect and placing the expected incisions within the relaxed skin tension lines where possible.    The area thus outlined was incised deep to adipose tissue with a #15 scalpel blade.  The skin margins were undermined to an appropriate distance in all directions utilizing iris scissors.
Burow's Advancement Flap Text: The defect edges were debeveled with a #15 scalpel blade.  Given the location of the defect and the proximity to free margins a Burow's advancement flap was deemed most appropriate.  Using a sterile surgical marker, the appropriate advancement flap was drawn incorporating the defect and placing the expected incisions within the relaxed skin tension lines where possible.    The area thus outlined was incised deep to adipose tissue with a #15 scalpel blade.  The skin margins were undermined to an appropriate distance in all directions utilizing iris scissors.
Chonodrocutaneous Helical Advancement Flap Text: The defect edges were debeveled with a #15 scalpel blade.  Given the location of the defect and the proximity to free margins a chondrocutaneous helical advancement flap was deemed most appropriate.  Using a sterile surgical marker, the appropriate advancement flap was drawn incorporating the defect and placing the expected incisions within the relaxed skin tension lines where possible.    The area thus outlined was incised deep to adipose tissue with a #15 scalpel blade.  The skin margins were undermined to an appropriate distance in all directions utilizing iris scissors.
Crescentic Advancement Flap Text: The defect edges were debeveled with a #15 scalpel blade.  Given the location of the defect and the proximity to free margins a crescentic advancement flap was deemed most appropriate.  Using a sterile surgical marker, the appropriate advancement flap was drawn incorporating the defect and placing the expected incisions within the relaxed skin tension lines where possible.    The area thus outlined was incised deep to adipose tissue with a #15 scalpel blade.  The skin margins were undermined to an appropriate distance in all directions utilizing iris scissors.
A-T Advancement Flap Text: The defect edges were debeveled with a #15 scalpel blade.  Given the location of the defect, shape of the defect and the proximity to free margins an A-T advancement flap was deemed most appropriate.  Using a sterile surgical marker, an appropriate advancement flap was drawn incorporating the defect and placing the expected incisions within the relaxed skin tension lines where possible.    The area thus outlined was incised deep to adipose tissue with a #15 scalpel blade.  The skin margins were undermined to an appropriate distance in all directions utilizing iris scissors.
O-T Advancement Flap Text: The defect edges were debeveled with a #15 scalpel blade.  Given the location of the defect, shape of the defect and the proximity to free margins an O-T advancement flap was deemed most appropriate.  Using a sterile surgical marker, an appropriate advancement flap was drawn incorporating the defect and placing the expected incisions within the relaxed skin tension lines where possible.    The area thus outlined was incised deep to adipose tissue with a #15 scalpel blade.  The skin margins were undermined to an appropriate distance in all directions utilizing iris scissors.
O-L Flap Text: The defect edges were debeveled with a #15 scalpel blade.  Given the location of the defect, shape of the defect and the proximity to free margins an O-L flap was deemed most appropriate.  Using a sterile surgical marker, an appropriate advancement flap was drawn incorporating the defect and placing the expected incisions within the relaxed skin tension lines where possible.    The area thus outlined was incised deep to adipose tissue with a #15 scalpel blade.  The skin margins were undermined to an appropriate distance in all directions utilizing iris scissors.
O-Z Flap Text: The defect edges were debeveled with a #15 scalpel blade.  Given the location of the defect, shape of the defect and the proximity to free margins an O-Z flap was deemed most appropriate.  Using a sterile surgical marker, an appropriate transposition flap was drawn incorporating the defect and placing the expected incisions within the relaxed skin tension lines where possible. The area thus outlined was incised deep to adipose tissue with a #15 scalpel blade.  The skin margins were undermined to an appropriate distance in all directions utilizing iris scissors.
Double O-Z Flap Text: The defect edges were debeveled with a #15 scalpel blade.  Given the location of the defect, shape of the defect and the proximity to free margins a Double O-Z flap was deemed most appropriate.  Using a sterile surgical marker, an appropriate transposition flap was drawn incorporating the defect and placing the expected incisions within the relaxed skin tension lines where possible. The area thus outlined was incised deep to adipose tissue with a #15 scalpel blade.  The skin margins were undermined to an appropriate distance in all directions utilizing iris scissors.
V-Y Flap Text: The defect edges were debeveled with a #15 scalpel blade.  Given the location of the defect, shape of the defect and the proximity to free margins a V-Y flap was deemed most appropriate.  Using a sterile surgical marker, an appropriate advancement flap was drawn incorporating the defect and placing the expected incisions within the relaxed skin tension lines where possible.    The area thus outlined was incised deep to adipose tissue with a #15 scalpel blade.  The skin margins were undermined to an appropriate distance in all directions utilizing iris scissors.
Advancement-Rotation Flap Text: The defect edges were debeveled with a #15 scalpel blade.  Given the location of the defect, shape of the defect and the proximity to free margins an advancement-rotation flap was deemed most appropriate.  Using a sterile surgical marker, an appropriate flap was drawn incorporating the defect and placing the expected incisions within the relaxed skin tension lines where possible. The area thus outlined was incised deep to adipose tissue with a #15 scalpel blade.  The skin margins were undermined to an appropriate distance in all directions utilizing iris scissors.
Mercedes Flap Text: The defect edges were debeveled with a #15 scalpel blade.  Given the location of the defect, shape of the defect and the proximity to free margins a Mercedes flap was deemed most appropriate.  Using a sterile surgical marker, an appropriate advancement flap was drawn incorporating the defect and placing the expected incisions within the relaxed skin tension lines where possible. The area thus outlined was incised deep to adipose tissue with a #15 scalpel blade.  The skin margins were undermined to an appropriate distance in all directions utilizing iris scissors.
Modified Advancement Flap Text: The defect edges were debeveled with a #15 scalpel blade.  Given the location of the defect, shape of the defect and the proximity to free margins a modified advancement flap was deemed most appropriate.  Using a sterile surgical marker, an appropriate advancement flap was drawn incorporating the defect and placing the expected incisions within the relaxed skin tension lines where possible.    The area thus outlined was incised deep to adipose tissue with a #15 scalpel blade.  The skin margins were undermined to an appropriate distance in all directions utilizing iris scissors.
Mucosal Advancement Flap Text: Given the location of the defect, shape of the defect and the proximity to free margins a mucosal advancement flap was deemed most appropriate. Incisions were made with a 15 blade scalpel in the appropriate fashion along the cutaneous vermilion border and the mucosal lip. The remaining actinically damaged mucosal tissue was excised.  The mucosal advancement flap was then elevated to the gingival sulcus with care taken to preserve the neurovascular structures and advanced into the primary defect. Care was taken to ensure that precise realignment of the vermilion border was achieved.
Peng Advancement Flap Text: The defect edges were debeveled with a #15 scalpel blade.  Given the location of the defect, shape of the defect and the proximity to free margins a Peng advancement flap was deemed most appropriate.  Using a sterile surgical marker, an appropriate advancement flap was drawn incorporating the defect and placing the expected incisions within the relaxed skin tension lines where possible. The area thus outlined was incised deep to adipose tissue with a #15 scalpel blade.  The skin margins were undermined to an appropriate distance in all directions utilizing iris scissors.
Hatchet Flap Text: The defect edges were debeveled with a #15 scalpel blade.  Given the location of the defect, shape of the defect and the proximity to free margins a hatchet flap was deemed most appropriate.  Using a sterile surgical marker, an appropriate hatchet flap was drawn incorporating the defect and placing the expected incisions within the relaxed skin tension lines where possible.    The area thus outlined was incised deep to adipose tissue with a #15 scalpel blade.  The skin margins were undermined to an appropriate distance in all directions utilizing iris scissors.
Rotation Flap Text: The defect edges were debeveled with a #15 scalpel blade.  Given the location of the defect, shape of the defect and the proximity to free margins a rotation flap was deemed most appropriate.  Using a sterile surgical marker, an appropriate rotation flap was drawn incorporating the defect and placing the expected incisions within the relaxed skin tension lines where possible.    The area thus outlined was incised deep to adipose tissue with a #15 scalpel blade.  The skin margins were undermined to an appropriate distance in all directions utilizing iris scissors.
Bilateral Rotation Flap Text: The defect edges were debeveled with a #15 scalpel blade. Given the location of the defect, shape of the defect and the proximity to free margins a bilateral rotation flap was deemed most appropriate. Using a sterile surgical marker, an appropriate rotation flap was drawn incorporating the defect and placing the expected incisions within the relaxed skin tension lines where possible. The area thus outlined was incised deep to adipose tissue with a #15 scalpel blade. The skin margins were undermined to an appropriate distance in all directions utilizing iris scissors. Following this, the designed flap was carried over into the primary defect and sutured into place.
Spiral Flap Text: The defect edges were debeveled with a #15 scalpel blade.  Given the location of the defect, shape of the defect and the proximity to free margins a spiral flap was deemed most appropriate.  Using a sterile surgical marker, an appropriate rotation flap was drawn incorporating the defect and placing the expected incisions within the relaxed skin tension lines where possible. The area thus outlined was incised deep to adipose tissue with a #15 scalpel blade.  The skin margins were undermined to an appropriate distance in all directions utilizing iris scissors.
Staged Advancement Flap Text: The defect edges were debeveled with a #15 scalpel blade.  Given the location of the defect, shape of the defect and the proximity to free margins a staged advancement flap was deemed most appropriate.  Using a sterile surgical marker, an appropriate advancement flap was drawn incorporating the defect and placing the expected incisions within the relaxed skin tension lines where possible. The area thus outlined was incised deep to adipose tissue with a #15 scalpel blade.  The skin margins were undermined to an appropriate distance in all directions utilizing iris scissors.
Star Wedge Flap Text: The defect edges were debeveled with a #15 scalpel blade.  Given the location of the defect, shape of the defect and the proximity to free margins a star wedge flap was deemed most appropriate.  Using a sterile surgical marker, an appropriate rotation flap was drawn incorporating the defect and placing the expected incisions within the relaxed skin tension lines where possible. The area thus outlined was incised deep to adipose tissue with a #15 scalpel blade.  The skin margins were undermined to an appropriate distance in all directions utilizing iris scissors.
Transposition Flap Text: The defect edges were debeveled with a #15 scalpel blade.  Given the location of the defect and the proximity to free margins a transposition flap was deemed most appropriate.  Using a sterile surgical marker, an appropriate transposition flap was drawn incorporating the defect.    The area thus outlined was incised deep to adipose tissue with a #15 scalpel blade.  The skin margins were undermined to an appropriate distance in all directions utilizing iris scissors.
Muscle Hinge Flap Text: The defect edges were debeveled with a #15 scalpel blade.  Given the size, depth and location of the defect and the proximity to free margins a muscle hinge flap was deemed most appropriate.  Using a sterile surgical marker, an appropriate hinge flap was drawn incorporating the defect. The area thus outlined was incised with a #15 scalpel blade.  The skin margins were undermined to an appropriate distance in all directions utilizing iris scissors.
Mustarde Flap Text: The defect edges were debeveled with a #15 scalpel blade.  Given the size, depth and location of the defect and the proximity to free margins a Mustarde flap was deemed most appropriate.  Using a sterile surgical marker, an appropriate flap was drawn incorporating the defect. The area thus outlined was incised with a #15 scalpel blade.  The skin margins were undermined to an appropriate distance in all directions utilizing iris scissors.
Nasal Turnover Hinge Flap Text: The defect edges were debeveled with a #15 scalpel blade.  Given the size, depth, location of the defect and the defect being full thickness a nasal turnover hinge flap was deemed most appropriate.  Using a sterile surgical marker, an appropriate hinge flap was drawn incorporating the defect. The area thus outlined was incised with a #15 scalpel blade. The flap was designed to recreate the nasal mucosal lining and the alar rim. The skin margins were undermined to an appropriate distance in all directions utilizing iris scissors.
Nasalis-Muscle-Based Myocutaneous Island Pedicle Flap Text: Using a #15 blade, an incision was made around the donor flap to the level of the nasalis muscle. Wide lateral undermining was then performed in both the subcutaneous plane above the nasalis muscle, and in a submuscular plane just above periosteum. This allowed the formation of a free nasalis muscle axial pedicle (based on the angular artery) which was still attached to the actual cutaneous flap, increasing its mobility and vascular viability. Hemostasis was obtained with pinpoint electrocoagulation. The flap was mobilized into position and the pivotal anchor points positioned and stabilized with buried interrupted sutures. Subcutaneous and dermal tissues were closed in a multilayered fashion with sutures. Tissue redundancies were excised, and the epidermal edges were apposed without significant tension and sutured with sutures.
Orbicularis Oris Muscle Flap Text: The defect edges were debeveled with a #15 scalpel blade.  Given that the defect affected the competency of the oral sphincter an obicularis oris muscle flap was deemed most appropriate to restore this competency and normal muscle function.  Using a sterile surgical marker, an appropriate flap was drawn incorporating the defect. The area thus outlined was incised with a #15 scalpel blade.
Melolabial Transposition Flap Text: The defect edges were debeveled with a #15 scalpel blade.  Given the location of the defect and the proximity to free margins a melolabial flap was deemed most appropriate.  Using a sterile surgical marker, an appropriate melolabial transposition flap was drawn incorporating the defect.    The area thus outlined was incised deep to adipose tissue with a #15 scalpel blade.  The skin margins were undermined to an appropriate distance in all directions utilizing iris scissors.
Rhombic Flap Text: The defect edges were debeveled with a #15 scalpel blade.  Given the location of the defect and the proximity to free margins a rhombic flap was deemed most appropriate.  Using a sterile surgical marker, an appropriate rhombic flap was drawn incorporating the defect.    The area thus outlined was incised deep to adipose tissue with a #15 scalpel blade.  The skin margins were undermined to an appropriate distance in all directions utilizing iris scissors.
Rhomboid Transposition Flap Text: The defect edges were debeveled with a #15 scalpel blade.  Given the location of the defect and the proximity to free margins a rhomboid transposition flap was deemed most appropriate.  Using a sterile surgical marker, an appropriate rhomboid flap was drawn incorporating the defect.    The area thus outlined was incised deep to adipose tissue with a #15 scalpel blade.  The skin margins were undermined to an appropriate distance in all directions utilizing iris scissors.
Bi-Rhombic Flap Text: The defect edges were debeveled with a #15 scalpel blade.  Given the location of the defect and the proximity to free margins a bi-rhombic flap was deemed most appropriate.  Using a sterile surgical marker, an appropriate rhombic flap was drawn incorporating the defect. The area thus outlined was incised deep to adipose tissue with a #15 scalpel blade.  The skin margins were undermined to an appropriate distance in all directions utilizing iris scissors.
Helical Rim Advancement Flap Text: The defect edges were debeveled with a #15 blade scalpel.  Given the location of the defect and the proximity to free margins (helical rim) a double helical rim advancement flap was deemed most appropriate.  Using a sterile surgical marker, the appropriate advancement flaps were drawn incorporating the defect and placing the expected incisions between the helical rim and antihelix where possible.  The area thus outlined was incised through and through with a #15 scalpel blade.  With a skin hook and iris scissors, the flaps were gently and sharply undermined and freed up.
Bilateral Helical Rim Advancement Flap Text: The defect edges were debeveled with a #15 blade scalpel.  Given the location of the defect and the proximity to free margins (helical rim) a bilateral helical rim advancement flap was deemed most appropriate.  Using a sterile surgical marker, the appropriate advancement flaps were drawn incorporating the defect and placing the expected incisions between the helical rim and antihelix where possible.  The area thus outlined was incised through and through with a #15 scalpel blade.  With a skin hook and iris scissors, the flaps were gently and sharply undermined and freed up.
Ear Star Wedge Flap Text: The defect edges were debeveled with a #15 blade scalpel.  Given the location of the defect and the proximity to free margins (helical rim) an ear star wedge flap was deemed most appropriate.  Using a sterile surgical marker, the appropriate flap was drawn incorporating the defect and placing the expected incisions between the helical rim and antihelix where possible.  The area thus outlined was incised through and through with a #15 scalpel blade.
Banner Transposition Flap Text: The defect edges were debeveled with a #15 scalpel blade.  Given the location of the defect and the proximity to free margins a Banner transposition flap was deemed most appropriate.  Using a sterile surgical marker, an appropriate flap drawn around the defect. The area thus outlined was incised deep to adipose tissue with a #15 scalpel blade.  The skin margins were undermined to an appropriate distance in all directions utilizing iris scissors.
Bilobed Flap Text: The defect edges were debeveled with a #15 scalpel blade.  Given the location of the defect and the proximity to free margins a bilobe flap was deemed most appropriate.  Using a sterile surgical marker, an appropriate bilobe flap drawn around the defect.    The area thus outlined was incised deep to adipose tissue with a #15 scalpel blade.  The skin margins were undermined to an appropriate distance in all directions utilizing iris scissors.
Bilobed Transposition Flap Text: The defect edges were debeveled with a #15 scalpel blade.  Given the location of the defect and the proximity to free margins a bilobed transposition flap was deemed most appropriate.  Using a sterile surgical marker, an appropriate bilobe flap drawn around the defect.    The area thus outlined was incised deep to adipose tissue with a #15 scalpel blade.  The skin margins were undermined to an appropriate distance in all directions utilizing iris scissors.
Trilobed Flap Text: The defect edges were debeveled with a #15 scalpel blade.  Given the location of the defect and the proximity to free margins a trilobed flap was deemed most appropriate.  Using a sterile surgical marker, an appropriate trilobed flap drawn around the defect.    The area thus outlined was incised deep to adipose tissue with a #15 scalpel blade.  The skin margins were undermined to an appropriate distance in all directions utilizing iris scissors.
Dorsal Nasal Flap Text: The defect edges were debeveled with a #15 scalpel blade.  Given the location of the defect and the proximity to free margins a dorsal nasal flap was deemed most appropriate.  Using a sterile surgical marker, an appropriate dorsal nasal flap was drawn around the defect.    The area thus outlined was incised deep to adipose tissue with a #15 scalpel blade.  The skin margins were undermined to an appropriate distance in all directions utilizing iris scissors.
Island Pedicle Flap Text: The defect edges were debeveled with a #15 scalpel blade.  Given the location of the defect, shape of the defect and the proximity to free margins an island pedicle advancement flap was deemed most appropriate.  Using a sterile surgical marker, an appropriate advancement flap was drawn incorporating the defect, outlining the appropriate donor tissue and placing the expected incisions within the relaxed skin tension lines where possible.    The area thus outlined was incised deep to adipose tissue with a #15 scalpel blade.  The skin margins were undermined to an appropriate distance in all directions around the primary defect and laterally outward around the island pedicle utilizing iris scissors.  There was minimal undermining beneath the pedicle flap.
Island Pedicle Flap With Canthal Suspension Text: The defect edges were debeveled with a #15 scalpel blade.  Given the location of the defect, shape of the defect and the proximity to free margins an island pedicle advancement flap was deemed most appropriate.  Using a sterile surgical marker, an appropriate advancement flap was drawn incorporating the defect, outlining the appropriate donor tissue and placing the expected incisions within the relaxed skin tension lines where possible. The area thus outlined was incised deep to adipose tissue with a #15 scalpel blade.  The skin margins were undermined to an appropriate distance in all directions around the primary defect and laterally outward around the island pedicle utilizing iris scissors.  There was minimal undermining beneath the pedicle flap. A suspension suture was placed in the canthal tendon to prevent tension and prevent ectropion.
Alar Island Pedicle Flap Text: The defect edges were debeveled with a #15 scalpel blade.  Given the location of the defect, shape of the defect and the proximity to the alar rim an island pedicle advancement flap was deemed most appropriate.  Using a sterile surgical marker, an appropriate advancement flap was drawn incorporating the defect, outlining the appropriate donor tissue and placing the expected incisions within the nasal ala running parallel to the alar rim. The area thus outlined was incised with a #15 scalpel blade.  The skin margins were undermined minimally to an appropriate distance in all directions around the primary defect and laterally outward around the island pedicle utilizing iris scissors.  There was minimal undermining beneath the pedicle flap.
Double Island Pedicle Flap Text: The defect edges were debeveled with a #15 scalpel blade.  Given the location of the defect, shape of the defect and the proximity to free margins a double island pedicle advancement flap was deemed most appropriate.  Using a sterile surgical marker, an appropriate advancement flap was drawn incorporating the defect, outlining the appropriate donor tissue and placing the expected incisions within the relaxed skin tension lines where possible.    The area thus outlined was incised deep to adipose tissue with a #15 scalpel blade.  The skin margins were undermined to an appropriate distance in all directions around the primary defect and laterally outward around the island pedicle utilizing iris scissors.  There was minimal undermining beneath the pedicle flap.
Island Pedicle Flap-Requiring Vessel Identification Text: The defect edges were debeveled with a #15 scalpel blade.  Given the location of the defect, shape of the defect and the proximity to free margins an island pedicle advancement flap was deemed most appropriate.  Using a sterile surgical marker, an appropriate advancement flap was drawn, based on the axial vessel mentioned above, incorporating the defect, outlining the appropriate donor tissue and placing the expected incisions within the relaxed skin tension lines where possible.    The area thus outlined was incised deep to adipose tissue with a #15 scalpel blade.  The skin margins were undermined to an appropriate distance in all directions around the primary defect and laterally outward around the island pedicle utilizing iris scissors.  There was minimal undermining beneath the pedicle flap.
Keystone Flap Text: The defect edges were debeveled with a #15 scalpel blade.  Given the location of the defect, shape of the defect a keystone flap was deemed most appropriate.  Using a sterile surgical marker, an appropriate keystone flap was drawn incorporating the defect, outlining the appropriate donor tissue and placing the expected incisions within the relaxed skin tension lines where possible. The area thus outlined was incised deep to adipose tissue with a #15 scalpel blade.  The skin margins were undermined to an appropriate distance in all directions around the primary defect and laterally outward around the flap utilizing iris scissors.
O-T Plasty Text: The defect edges were debeveled with a #15 scalpel blade.  Given the location of the defect, shape of the defect and the proximity to free margins an O-T plasty was deemed most appropriate.  Using a sterile surgical marker, an appropriate O-T plasty was drawn incorporating the defect and placing the expected incisions within the relaxed skin tension lines where possible.    The area thus outlined was incised deep to adipose tissue with a #15 scalpel blade.  The skin margins were undermined to an appropriate distance in all directions utilizing iris scissors.
O-Z Plasty Text: The defect edges were debeveled with a #15 scalpel blade.  Given the location of the defect, shape of the defect and the proximity to free margins an O-Z plasty (double transposition flap) was deemed most appropriate.  Using a sterile surgical marker, the appropriate transposition flaps were drawn incorporating the defect and placing the expected incisions within the relaxed skin tension lines where possible.    The area thus outlined was incised deep to adipose tissue with a #15 scalpel blade.  The skin margins were undermined to an appropriate distance in all directions utilizing iris scissors.  Hemostasis was achieved with electrocautery.  The flaps were then transposed into place, one clockwise and the other counterclockwise, and anchored with interrupted buried subcutaneous sutures.
Double O-Z Plasty Text: The defect edges were debeveled with a #15 scalpel blade.  Given the location of the defect, shape of the defect and the proximity to free margins a Double O-Z plasty (double transposition flap) was deemed most appropriate.  Using a sterile surgical marker, the appropriate transposition flaps were drawn incorporating the defect and placing the expected incisions within the relaxed skin tension lines where possible. The area thus outlined was incised deep to adipose tissue with a #15 scalpel blade.  The skin margins were undermined to an appropriate distance in all directions utilizing iris scissors.  Hemostasis was achieved with electrocautery.  The flaps were then transposed into place, one clockwise and the other counterclockwise, and anchored with interrupted buried subcutaneous sutures.
V-Y Plasty Text: The defect edges were debeveled with a #15 scalpel blade.  Given the location of the defect, shape of the defect and the proximity to free margins an V-Y advancement flap was deemed most appropriate.  Using a sterile surgical marker, an appropriate advancement flap was drawn incorporating the defect and placing the expected incisions within the relaxed skin tension lines where possible.    The area thus outlined was incised deep to adipose tissue with a #15 scalpel blade.  The skin margins were undermined to an appropriate distance in all directions utilizing iris scissors.
H Plasty Text: Given the location of the defect, shape of the defect and the proximity to free margins a H-plasty was deemed most appropriate for repair.  Using a sterile surgical marker, the appropriate advancement arms of the H-plasty were drawn incorporating the defect and placing the expected incisions within the relaxed skin tension lines where possible. The area thus outlined was incised deep to adipose tissue with a #15 scalpel blade. The skin margins were undermined to an appropriate distance in all directions utilizing iris scissors.  The opposing advancement arms were then advanced into place in opposite direction and anchored with interrupted buried subcutaneous sutures.
W Plasty Text: The lesion was extirpated to the level of the fat with a #15 scalpel blade.  Given the location of the defect, shape of the defect and the proximity to free margins a W-plasty was deemed most appropriate for repair.  Using a sterile surgical marker, the appropriate transposition arms of the W-plasty were drawn incorporating the defect and placing the expected incisions within the relaxed skin tension lines where possible.    The area thus outlined was incised deep to adipose tissue with a #15 scalpel blade.  The skin margins were undermined to an appropriate distance in all directions utilizing iris scissors.  The opposing transposition arms were then transposed into place in opposite direction and anchored with interrupted buried subcutaneous sutures.
Z Plasty Text: The lesion was extirpated to the level of the fat with a #15 scalpel blade.  Given the location of the defect, shape of the defect and the proximity to free margins a Z-plasty was deemed most appropriate for repair.  Using a sterile surgical marker, the appropriate transposition arms of the Z-plasty were drawn incorporating the defect and placing the expected incisions within the relaxed skin tension lines where possible.    The area thus outlined was incised deep to adipose tissue with a #15 scalpel blade.  The skin margins were undermined to an appropriate distance in all directions utilizing iris scissors.  The opposing transposition arms were then transposed into place in opposite direction and anchored with interrupted buried subcutaneous sutures.
Double Z Plasty Text: The lesion was extirpated to the level of the fat with a #15 scalpel blade. Given the location of the defect, shape of the defect and the proximity to free margins a double Z-plasty was deemed most appropriate for repair. Using a sterile surgical marker, the appropriate transposition arms of the double Z-plasty were drawn incorporating the defect and placing the expected incisions within the relaxed skin tension lines where possible. The area thus outlined was incised deep to adipose tissue with a #15 scalpel blade. The skin margins were undermined to an appropriate distance in all directions utilizing iris scissors. The opposing transposition arms were then transposed and carried over into place in opposite direction and anchored with interrupted buried subcutaneous sutures.
Zygomaticofacial Flap Text: Given the location of the defect, shape of the defect and the proximity to free margins a zygomaticofacial flap was deemed most appropriate for repair.  Using a sterile surgical marker, the appropriate flap was drawn incorporating the defect and placing the expected incisions within the relaxed skin tension lines where possible. The area thus outlined was incised deep to adipose tissue with a #15 scalpel blade with preservation of a vascular pedicle.  The skin margins were undermined to an appropriate distance in all directions utilizing iris scissors.  The flap was then placed into the defect and anchored with interrupted buried subcutaneous sutures.
Cheek Interpolation Flap Text: A decision was made to reconstruct the defect utilizing an interpolation axial flap and a staged reconstruction.  A telfa template was made of the defect.  This telfa template was then used to outline the Cheek Interpolation flap.  The donor area for the pedicle flap was then injected with anesthesia.  The flap was excised through the skin and subcutaneous tissue down to the layer of the underlying musculature.  The interpolation flap was carefully excised within this deep plane to maintain its blood supply.  The edges of the donor site were undermined.   The donor site was closed in a primary fashion.  The pedicle was then rotated into position and sutured.  Once the tube was sutured into place, adequate blood supply was confirmed with blanching and refill.  The pedicle was then wrapped with xeroform gauze and dressed appropriately with a telfa and gauze bandage to ensure continued blood supply and protect the attached pedicle.
Cheek-To-Nose Interpolation Flap Text: A decision was made to reconstruct the defect utilizing an interpolation axial flap and a staged reconstruction.  A telfa template was made of the defect.  This telfa template was then used to outline the Cheek-To-Nose Interpolation flap.  The donor area for the pedicle flap was then injected with anesthesia.  The flap was excised through the skin and subcutaneous tissue down to the layer of the underlying musculature.  The interpolation flap was carefully excised within this deep plane to maintain its blood supply.  The edges of the donor site were undermined.   The donor site was closed in a primary fashion.  The pedicle was then rotated into position and sutured.  Once the tube was sutured into place, adequate blood supply was confirmed with blanching and refill.  The pedicle was then wrapped with xeroform gauze and dressed appropriately with a telfa and gauze bandage to ensure continued blood supply and protect the attached pedicle.
Interpolation Flap Text: A decision was made to reconstruct the defect utilizing an interpolation axial flap and a staged reconstruction.  A telfa template was made of the defect.  This telfa template was then used to outline the interpolation flap.  The donor area for the pedicle flap was then injected with anesthesia.  The flap was excised through the skin and subcutaneous tissue down to the layer of the underlying musculature.  The interpolation flap was carefully excised within this deep plane to maintain its blood supply.  The edges of the donor site were undermined.   The donor site was closed in a primary fashion.  The pedicle was then rotated into position and sutured.  Once the tube was sutured into place, adequate blood supply was confirmed with blanching and refill.  The pedicle was then wrapped with xeroform gauze and dressed appropriately with a telfa and gauze bandage to ensure continued blood supply and protect the attached pedicle.
Melolabial Interpolation Flap Text: A decision was made to reconstruct the defect utilizing an interpolation axial flap and a staged reconstruction.  A telfa template was made of the defect.  This telfa template was then used to outline the melolabial interpolation flap.  The donor area for the pedicle flap was then injected with anesthesia.  The flap was excised through the skin and subcutaneous tissue down to the layer of the underlying musculature.  The pedicle flap was carefully excised within this deep plane to maintain its blood supply.  The edges of the donor site were undermined.   The donor site was closed in a primary fashion.  The pedicle was then rotated into position and sutured.  Once the tube was sutured into place, adequate blood supply was confirmed with blanching and refill.  The pedicle was then wrapped with xeroform gauze and dressed appropriately with a telfa and gauze bandage to ensure continued blood supply and protect the attached pedicle.
Mastoid Interpolation Flap Text: A decision was made to reconstruct the defect utilizing an interpolation axial flap and a staged reconstruction.  A telfa template was made of the defect.  This telfa template was then used to outline the mastoid interpolation flap.  The donor area for the pedicle flap was then injected with anesthesia.  The flap was excised through the skin and subcutaneous tissue down to the layer of the underlying musculature.  The pedicle flap was carefully excised within this deep plane to maintain its blood supply.  The edges of the donor site were undermined.   The donor site was closed in a primary fashion.  The pedicle was then rotated into position and sutured.  Once the tube was sutured into place, adequate blood supply was confirmed with blanching and refill.  The pedicle was then wrapped with xeroform gauze and dressed appropriately with a telfa and gauze bandage to ensure continued blood supply and protect the attached pedicle.
Posterior Auricular Interpolation Flap Text: A decision was made to reconstruct the defect utilizing an interpolation axial flap and a staged reconstruction.  A telfa template was made of the defect.  This telfa template was then used to outline the posterior auricular interpolation flap.  The donor area for the pedicle flap was then injected with anesthesia.  The flap was excised through the skin and subcutaneous tissue down to the layer of the underlying musculature.  The pedicle flap was carefully excised within this deep plane to maintain its blood supply.  The edges of the donor site were undermined.   The donor site was closed in a primary fashion.  The pedicle was then rotated into position and sutured.  Once the tube was sutured into place, adequate blood supply was confirmed with blanching and refill.  The pedicle was then wrapped with xeroform gauze and dressed appropriately with a telfa and gauze bandage to ensure continued blood supply and protect the attached pedicle.
Paramedian Forehead Flap Text: A decision was made to reconstruct the defect utilizing an interpolation axial flap and a staged reconstruction.  A telfa template was made of the defect.  This telfa template was then used to outline the paramedian forehead pedicle flap.  The donor area for the pedicle flap was then injected with anesthesia.  The flap was excised through the skin and subcutaneous tissue down to the layer of the underlying musculature.  The pedicle flap was carefully excised within this deep plane to maintain its blood supply.  The edges of the donor site were undermined.   The donor site was closed in a primary fashion.  The pedicle was then rotated into position and sutured.  Once the tube was sutured into place, adequate blood supply was confirmed with blanching and refill.  The pedicle was then wrapped with xeroform gauze and dressed appropriately with a telfa and gauze bandage to ensure continued blood supply and protect the attached pedicle.
Abbe Flap (Upper To Lower Lip) Text: The defect of the lower lip was assessed and measured.  Given the location and size of the defect, an Abbe flap was deemed most appropriate. Using a sterile surgical marker, an appropriate Abbe flap was measured and drawn on the upper lip. Local anesthesia was then infiltrated.  A scalpel was then used to incise the upper lip through and through the skin, vermilion, muscle and mucosa, leaving the flap pedicled on the opposite side.  The flap was then rotated and transferred to the lower lip defect.  The flap was then sutured into place with a three layer technique, closing the orbicularis oris muscle layer with subcutaneous buried sutures, followed by a mucosal layer and an epidermal layer.
Abbe Flap (Lower To Upper Lip) Text: The defect of the upper lip was assessed and measured.  Given the location and size of the defect, an Abbe flap was deemed most appropriate. Using a sterile surgical marker, an appropriate Abbe flap was measured and drawn on the lower lip. Local anesthesia was then infiltrated. A scalpel was then used to incise the upper lip through and through the skin, vermilion, muscle and mucosa, leaving the flap pedicled on the opposite side.  The flap was then rotated and transferred to the lower lip defect.  The flap was then sutured into place with a three layer technique, closing the orbicularis oris muscle layer with subcutaneous buried sutures, followed by a mucosal layer and an epidermal layer.
Estlander Flap (Upper To Lower Lip) Text: The defect of the lower lip was assessed and measured.  Given the location and size of the defect, an Estlander flap was deemed most appropriate. Using a sterile surgical marker, an appropriate Estlander flap was measured and drawn on the upper lip. Local anesthesia was then infiltrated. A scalpel was then used to incise the lateral aspect of the flap, through skin, muscle and mucosa, leaving the flap pedicled medially.  The flap was then rotated and positioned to fill the lower lip defect.  The flap was then sutured into place with a three layer technique, closing the orbicularis oris muscle layer with subcutaneous buried sutures, followed by a mucosal layer and an epidermal layer.
Lip Wedge Excision Repair Text: Given the location of the defect and the proximity to free margins a full thickness wedge repair was deemed most appropriate.  Using a sterile surgical marker, the appropriate repair was drawn incorporating the defect and placing the expected incisions perpendicular to the vermilion border.  The vermilion border was also meticulously outlined to ensure appropriate reapproximation during the repair.  The area thus outlined was incised through and through with a #15 scalpel blade.  The muscularis and dermis were reaproximated with deep sutures following hemostasis. Care was taken to realign the vermilion border before proceeding with the superficial closure.  Once the vermilion was realigned the superfical and mucosal closure was finished.
Ftsg Text: The defect edges were debeveled with a #15 scalpel blade.  Given the location of the defect, shape of the defect and the proximity to free margins a full thickness skin graft was deemed most appropriate.  Using a sterile surgical marker, the primary defect shape was transferred to the donor site. The area thus outlined was incised deep to adipose tissue with a #15 scalpel blade.  The harvested graft was then trimmed of adipose tissue until only dermis and epidermis was left.  The skin margins of the secondary defect were undermined to an appropriate distance in all directions utilizing iris scissors.  The secondary defect was closed with interrupted buried subcutaneous sutures.  The skin edges were then re-apposed with running  sutures.  The skin graft was then placed in the primary defect and oriented appropriately.
Split-Thickness Skin Graft Text: The defect edges were debeveled with a #15 scalpel blade.  Given the location of the defect, shape of the defect and the proximity to free margins a split thickness skin graft was deemed most appropriate.  Using a sterile surgical marker, the primary defect shape was transferred to the donor site. The split thickness graft was then harvested.  The skin graft was then placed in the primary defect and oriented appropriately.
Pinch Graft Text: The defect edges were debeveled with a #15 scalpel blade. Given the location of the defect, shape of the defect and the proximity to free margins a pinch graft was deemed most appropriate. Using a sterile surgical marker, the primary defect shape was transferred to the donor site. The area thus outlined was incised deep to adipose tissue with a #15 scalpel blade.  The harvested graft was then trimmed of adipose tissue until only dermis and epidermis was left. The skin margins of the secondary defect were undermined to an appropriate distance in all directions utilizing iris scissors.  The secondary defect was closed with interrupted buried subcutaneous sutures.  The skin edges were then re-apposed with running  sutures.  The skin graft was then placed in the primary defect and oriented appropriately.
Burow's Graft Text: The defect edges were debeveled with a #15 scalpel blade.  Given the location of the defect, shape of the defect, the proximity to free margins and the presence of a standing cone deformity a Burow's skin graft was deemed most appropriate. The standing cone was removed and this tissue was then trimmed to the shape of the primary defect. The adipose tissue was also removed until only dermis and epidermis were left.  The skin margins of the secondary defect were undermined to an appropriate distance in all directions utilizing iris scissors.  The secondary defect was closed with interrupted buried subcutaneous sutures.  The skin edges were then re-apposed with running  sutures.  The skin graft was then placed in the primary defect and oriented appropriately.
Cartilage Graft Text: The defect edges were debeveled with a #15 scalpel blade.  Given the location of the defect, shape of the defect, the fact the defect involved a full thickness cartilage defect a cartilage graft was deemed most appropriate.  An appropriate donor site was identified, cleansed, and anesthetized. The cartilage graft was then harvested and transferred to the recipient site, oriented appropriately and then sutured into place.  The secondary defect was then repaired using a primary closure.
Composite Graft Text: The defect edges were debeveled with a #15 scalpel blade.  Given the location of the defect, shape of the defect, the proximity to free margins and the fact the defect was full thickness a composite graft was deemed most appropriate.  The defect was outline and then transferred to the donor site.  A full thickness graft was then excised from the donor site. The graft was then placed in the primary defect, oriented appropriately and then sutured into place.  The secondary defect was then repaired using a primary closure.
Epidermal Autograft Text: The defect edges were debeveled with a #15 scalpel blade.  Given the location of the defect, shape of the defect and the proximity to free margins an epidermal autograft was deemed most appropriate.  Using a sterile surgical marker, the primary defect shape was transferred to the donor site. The epidermal graft was then harvested.  The skin graft was then placed in the primary defect and oriented appropriately.
Dermal Autograft Text: The defect edges were debeveled with a #15 scalpel blade.  Given the location of the defect, shape of the defect and the proximity to free margins a dermal autograft was deemed most appropriate.  Using a sterile surgical marker, the primary defect shape was transferred to the donor site. The area thus outlined was incised deep to adipose tissue with a #15 scalpel blade.  The harvested graft was then trimmed of adipose and epidermal tissue until only dermis was left.  The skin graft was then placed in the primary defect and oriented appropriately.
Skin Substitute Text: The defect edges were debeveled with a #15 scalpel blade.  Given the location of the defect, shape of the defect and the proximity to free margins a skin substitute graft was deemed most appropriate.  The graft material was trimmed to fit the size of the defect. The graft was then placed in the primary defect and oriented appropriately.
Tissue Cultured Epidermal Autograft Text: The defect edges were debeveled with a #15 scalpel blade.  Given the location of the defect, shape of the defect and the proximity to free margins a tissue cultured epidermal autograft was deemed most appropriate.  The graft was then trimmed to fit the size of the defect.  The graft was then placed in the primary defect and oriented appropriately.
Xenograft Text: The defect edges were debeveled with a #15 scalpel blade.  Given the location of the defect, shape of the defect and the proximity to free margins a xenograft was deemed most appropriate.  The graft was then trimmed to fit the size of the defect.  The graft was then placed in the primary defect and oriented appropriately.
Purse String (Intermediate) Text: Given the location of the defect and the characteristics of the surrounding skin a purse string intermediate closure was deemed most appropriate.  Undermining was performed circumfirentially around the surgical defect.  A purse string suture was then placed and tightened.
Purse String (Simple) Text: Given the location of the defect and the characteristics of the surrounding skin a purse string simple closure was deemed most appropriate.  Undermining was performed circumferentially around the surgical defect.  A purse string suture was then placed and tightened.
Partial Purse String (Intermediate) Text: Given the location of the defect and the characteristics of the surrounding skin an intermediate purse string closure was deemed most appropriate.  Undermining was performed circumferentially around the surgical defect.  A purse string suture was then placed and tightened. Wound tension of the circular defect prevented complete closure of the wound.
Partial Purse String (Simple) Text: Given the location of the defect and the characteristics of the surrounding skin a simple purse string closure was deemed most appropriate.  Undermining was performed circumferentially around the surgical defect.  A purse string suture was then placed and tightened. Wound tension of the circular defect prevented complete closure of the wound.
Complex Repair And Single Advancement Flap Text: The defect edges were debeveled with a #15 scalpel blade.  The primary defect was closed partially with a complex linear closure.  Given the location of the remaining defect, shape of the defect and the proximity to free margins a single advancement flap was deemed most appropriate for complete closure of the defect.  Using a sterile surgical marker, an appropriate advancement flap was drawn incorporating the defect and placing the expected incisions within the relaxed skin tension lines where possible.    The area thus outlined was incised deep to adipose tissue with a #15 scalpel blade.  The skin margins were undermined to an appropriate distance in all directions utilizing iris scissors.
Complex Repair And Double Advancement Flap Text: The defect edges were debeveled with a #15 scalpel blade.  The primary defect was closed partially with a complex linear closure.  Given the location of the remaining defect, shape of the defect and the proximity to free margins a double advancement flap was deemed most appropriate for complete closure of the defect.  Using a sterile surgical marker, an appropriate advancement flap was drawn incorporating the defect and placing the expected incisions within the relaxed skin tension lines where possible.    The area thus outlined was incised deep to adipose tissue with a #15 scalpel blade.  The skin margins were undermined to an appropriate distance in all directions utilizing iris scissors.
Complex Repair And Modified Advancement Flap Text: The defect edges were debeveled with a #15 scalpel blade.  The primary defect was closed partially with a complex linear closure.  Given the location of the remaining defect, shape of the defect and the proximity to free margins a modified advancement flap was deemed most appropriate for complete closure of the defect.  Using a sterile surgical marker, an appropriate advancement flap was drawn incorporating the defect and placing the expected incisions within the relaxed skin tension lines where possible.    The area thus outlined was incised deep to adipose tissue with a #15 scalpel blade.  The skin margins were undermined to an appropriate distance in all directions utilizing iris scissors.
Complex Repair And A-T Advancement Flap Text: The defect edges were debeveled with a #15 scalpel blade.  The primary defect was closed partially with a complex linear closure.  Given the location of the remaining defect, shape of the defect and the proximity to free margins an A-T advancement flap was deemed most appropriate for complete closure of the defect.  Using a sterile surgical marker, an appropriate advancement flap was drawn incorporating the defect and placing the expected incisions within the relaxed skin tension lines where possible.    The area thus outlined was incised deep to adipose tissue with a #15 scalpel blade.  The skin margins were undermined to an appropriate distance in all directions utilizing iris scissors.
Complex Repair And O-T Advancement Flap Text: The defect edges were debeveled with a #15 scalpel blade.  The primary defect was closed partially with a complex linear closure.  Given the location of the remaining defect, shape of the defect and the proximity to free margins an O-T advancement flap was deemed most appropriate for complete closure of the defect.  Using a sterile surgical marker, an appropriate advancement flap was drawn incorporating the defect and placing the expected incisions within the relaxed skin tension lines where possible.    The area thus outlined was incised deep to adipose tissue with a #15 scalpel blade.  The skin margins were undermined to an appropriate distance in all directions utilizing iris scissors.
Complex Repair And O-L Flap Text: The defect edges were debeveled with a #15 scalpel blade.  The primary defect was closed partially with a complex linear closure.  Given the location of the remaining defect, shape of the defect and the proximity to free margins an O-L flap was deemed most appropriate for complete closure of the defect.  Using a sterile surgical marker, an appropriate flap was drawn incorporating the defect and placing the expected incisions within the relaxed skin tension lines where possible.    The area thus outlined was incised deep to adipose tissue with a #15 scalpel blade.  The skin margins were undermined to an appropriate distance in all directions utilizing iris scissors.
Complex Repair And Bilobe Flap Text: The defect edges were debeveled with a #15 scalpel blade.  The primary defect was closed partially with a complex linear closure.  Given the location of the remaining defect, shape of the defect and the proximity to free margins a bilobe flap was deemed most appropriate for complete closure of the defect.  Using a sterile surgical marker, an appropriate advancement flap was drawn incorporating the defect and placing the expected incisions within the relaxed skin tension lines where possible.    The area thus outlined was incised deep to adipose tissue with a #15 scalpel blade.  The skin margins were undermined to an appropriate distance in all directions utilizing iris scissors.
Complex Repair And Melolabial Flap Text: The defect edges were debeveled with a #15 scalpel blade.  The primary defect was closed partially with a complex linear closure.  Given the location of the remaining defect, shape of the defect and the proximity to free margins a melolabial flap was deemed most appropriate for complete closure of the defect.  Using a sterile surgical marker, an appropriate advancement flap was drawn incorporating the defect and placing the expected incisions within the relaxed skin tension lines where possible.    The area thus outlined was incised deep to adipose tissue with a #15 scalpel blade.  The skin margins were undermined to an appropriate distance in all directions utilizing iris scissors.
Complex Repair And Rotation Flap Text: The defect edges were debeveled with a #15 scalpel blade.  The primary defect was closed partially with a complex linear closure.  Given the location of the remaining defect, shape of the defect and the proximity to free margins a rotation flap was deemed most appropriate for complete closure of the defect.  Using a sterile surgical marker, an appropriate advancement flap was drawn incorporating the defect and placing the expected incisions within the relaxed skin tension lines where possible.    The area thus outlined was incised deep to adipose tissue with a #15 scalpel blade.  The skin margins were undermined to an appropriate distance in all directions utilizing iris scissors.
Complex Repair And Rhombic Flap Text: The defect edges were debeveled with a #15 scalpel blade.  The primary defect was closed partially with a complex linear closure.  Given the location of the remaining defect, shape of the defect and the proximity to free margins a rhombic flap was deemed most appropriate for complete closure of the defect.  Using a sterile surgical marker, an appropriate advancement flap was drawn incorporating the defect and placing the expected incisions within the relaxed skin tension lines where possible.    The area thus outlined was incised deep to adipose tissue with a #15 scalpel blade.  The skin margins were undermined to an appropriate distance in all directions utilizing iris scissors.
Complex Repair And Transposition Flap Text: The defect edges were debeveled with a #15 scalpel blade.  The primary defect was closed partially with a complex linear closure.  Given the location of the remaining defect, shape of the defect and the proximity to free margins a transposition flap was deemed most appropriate for complete closure of the defect.  Using a sterile surgical marker, an appropriate advancement flap was drawn incorporating the defect and placing the expected incisions within the relaxed skin tension lines where possible.    The area thus outlined was incised deep to adipose tissue with a #15 scalpel blade.  The skin margins were undermined to an appropriate distance in all directions utilizing iris scissors.
Complex Repair And V-Y Plasty Text: The defect edges were debeveled with a #15 scalpel blade.  The primary defect was closed partially with a complex linear closure.  Given the location of the remaining defect, shape of the defect and the proximity to free margins a V-Y plasty was deemed most appropriate for complete closure of the defect.  Using a sterile surgical marker, an appropriate advancement flap was drawn incorporating the defect and placing the expected incisions within the relaxed skin tension lines where possible.    The area thus outlined was incised deep to adipose tissue with a #15 scalpel blade.  The skin margins were undermined to an appropriate distance in all directions utilizing iris scissors.
Complex Repair And M Plasty Text: The defect edges were debeveled with a #15 scalpel blade.  The primary defect was closed partially with a complex linear closure.  Given the location of the remaining defect, shape of the defect and the proximity to free margins an M plasty was deemed most appropriate for complete closure of the defect.  Using a sterile surgical marker, an appropriate advancement flap was drawn incorporating the defect and placing the expected incisions within the relaxed skin tension lines where possible.    The area thus outlined was incised deep to adipose tissue with a #15 scalpel blade.  The skin margins were undermined to an appropriate distance in all directions utilizing iris scissors.
Complex Repair And Double M Plasty Text: The defect edges were debeveled with a #15 scalpel blade.  The primary defect was closed partially with a complex linear closure.  Given the location of the remaining defect, shape of the defect and the proximity to free margins a double M plasty was deemed most appropriate for complete closure of the defect.  Using a sterile surgical marker, an appropriate advancement flap was drawn incorporating the defect and placing the expected incisions within the relaxed skin tension lines where possible.    The area thus outlined was incised deep to adipose tissue with a #15 scalpel blade.  The skin margins were undermined to an appropriate distance in all directions utilizing iris scissors.
Complex Repair And W Plasty Text: The defect edges were debeveled with a #15 scalpel blade.  The primary defect was closed partially with a complex linear closure.  Given the location of the remaining defect, shape of the defect and the proximity to free margins a W plasty was deemed most appropriate for complete closure of the defect.  Using a sterile surgical marker, an appropriate advancement flap was drawn incorporating the defect and placing the expected incisions within the relaxed skin tension lines where possible.    The area thus outlined was incised deep to adipose tissue with a #15 scalpel blade.  The skin margins were undermined to an appropriate distance in all directions utilizing iris scissors.
Complex Repair And Z Plasty Text: The defect edges were debeveled with a #15 scalpel blade.  The primary defect was closed partially with a complex linear closure.  Given the location of the remaining defect, shape of the defect and the proximity to free margins a Z plasty was deemed most appropriate for complete closure of the defect.  Using a sterile surgical marker, an appropriate advancement flap was drawn incorporating the defect and placing the expected incisions within the relaxed skin tension lines where possible.    The area thus outlined was incised deep to adipose tissue with a #15 scalpel blade.  The skin margins were undermined to an appropriate distance in all directions utilizing iris scissors.
Complex Repair And Dorsal Nasal Flap Text: The defect edges were debeveled with a #15 scalpel blade.  The primary defect was closed partially with a complex linear closure.  Given the location of the remaining defect, shape of the defect and the proximity to free margins a dorsal nasal flap was deemed most appropriate for complete closure of the defect.  Using a sterile surgical marker, an appropriate flap was drawn incorporating the defect and placing the expected incisions within the relaxed skin tension lines where possible.    The area thus outlined was incised deep to adipose tissue with a #15 scalpel blade.  The skin margins were undermined to an appropriate distance in all directions utilizing iris scissors.
Complex Repair And Ftsg Text: The defect edges were debeveled with a #15 scalpel blade.  The primary defect was closed partially with a complex linear closure.  Given the location of the defect, shape of the defect and the proximity to free margins a full thickness skin graft was deemed most appropriate to repair the remaining defect.  The graft was trimmed to fit the size of the remaining defect.  The graft was then placed in the primary defect, oriented appropriately, and sutured into place.
Complex Repair And Burow's Graft Text: The defect edges were debeveled with a #15 scalpel blade.  The primary defect was closed partially with a complex linear closure.  Given the location of the defect, shape of the defect, the proximity to free margins and the presence of a standing cone deformity a Burow's graft was deemed most appropriate to repair the remaining defect.  The graft was trimmed to fit the size of the remaining defect.  The graft was then placed in the primary defect, oriented appropriately, and sutured into place.
Complex Repair And Split-Thickness Skin Graft Text: The defect edges were debeveled with a #15 scalpel blade.  The primary defect was closed partially with a complex linear closure.  Given the location of the defect, shape of the defect and the proximity to free margins a split thickness skin graft was deemed most appropriate to repair the remaining defect.  The graft was trimmed to fit the size of the remaining defect.  The graft was then placed in the primary defect, oriented appropriately, and sutured into place.
Complex Repair And Epidermal Autograft Text: The defect edges were debeveled with a #15 scalpel blade.  The primary defect was closed partially with a complex linear closure.  Given the location of the defect, shape of the defect and the proximity to free margins an epidermal autograft was deemed most appropriate to repair the remaining defect.  The graft was trimmed to fit the size of the remaining defect.  The graft was then placed in the primary defect, oriented appropriately, and sutured into place.
Complex Repair And Dermal Autograft Text: The defect edges were debeveled with a #15 scalpel blade.  The primary defect was closed partially with a complex linear closure.  Given the location of the defect, shape of the defect and the proximity to free margins an dermal autograft was deemed most appropriate to repair the remaining defect.  The graft was trimmed to fit the size of the remaining defect.  The graft was then placed in the primary defect, oriented appropriately, and sutured into place.
Complex Repair And Tissue Cultured Epidermal Autograft Text: The defect edges were debeveled with a #15 scalpel blade.  The primary defect was closed partially with a complex linear closure.  Given the location of the defect, shape of the defect and the proximity to free margins an tissue cultured epidermal autograft was deemed most appropriate to repair the remaining defect.  The graft was trimmed to fit the size of the remaining defect.  The graft was then placed in the primary defect, oriented appropriately, and sutured into place.
Complex Repair And Xenograft Text: The defect edges were debeveled with a #15 scalpel blade.  The primary defect was closed partially with a complex linear closure.  Given the location of the defect, shape of the defect and the proximity to free margins a xenograft was deemed most appropriate to repair the remaining defect.  The graft was trimmed to fit the size of the remaining defect.  The graft was then placed in the primary defect, oriented appropriately, and sutured into place.
Complex Repair And Skin Substitute Graft Text: The defect edges were debeveled with a #15 scalpel blade.  The primary defect was closed partially with a complex linear closure.  Given the location of the remaining defect, shape of the defect and the proximity to free margins a skin substitute graft was deemed most appropriate to repair the remaining defect.  The graft was trimmed to fit the size of the remaining defect.  The graft was then placed in the primary defect, oriented appropriately, and sutured into place.
Path Notes (To The Dermatopathologist): Please check margins.
Consent was obtained from the patient. The risks and benefits to therapy were discussed in detail. Specifically, the risks of infection, scarring, bleeding, prolonged wound healing, incomplete removal, allergy to anesthesia, nerve injury and recurrence were addressed. Prior to the procedure, the treatment site was clearly identified and confirmed by the patient. All components of Universal Protocol/PAUSE Rule completed.
Post-Care Instructions: I reviewed with the patient in detail post-care instructions. Patient is not to engage in any heavy lifting, exercise, or swimming for the next 14 days. Should the patient develop any fevers, chills, expanding redness, bleeding, purulent drainage, severe pain patient will contact the office immediately.
Home Suture Removal Text: Patient was provided a home suture removal kit and will remove their sutures at home.  If they have any questions or difficulties they will call the office.
Where Do You Want The Question To Include Opioid Counseling Located?: Case Summary Tab
Information: Selecting Yes will display possible errors in your note based on the variables you have selected. This validation is only offered as a suggestion for you. PLEASE NOTE THAT THE VALIDATION TEXT WILL BE REMOVED WHEN YOU FINALIZE YOUR NOTE. IF YOU WANT TO FAX A PRELIMINARY NOTE YOU WILL NEED TO TOGGLE THIS TO 'NO' IF YOU DO NOT WANT IT IN YOUR FAXED NOTE.

## 2023-09-14 ENCOUNTER — APPOINTMENT (RX ONLY)
Dept: URBAN - METROPOLITAN AREA CLINIC 15 | Facility: CLINIC | Age: 68
Setting detail: DERMATOLOGY
End: 2023-09-14

## 2023-09-14 DIAGNOSIS — Z48.02 ENCOUNTER FOR REMOVAL OF SUTURES: ICD-10-CM

## 2023-09-14 PROCEDURE — ? SUTURE REMOVAL (GLOBAL PERIOD)

## 2023-09-14 PROCEDURE — ? TREATMENT REGIMEN

## 2023-09-14 PROCEDURE — ? PRESCRIPTION

## 2023-09-14 PROCEDURE — ? MEDICATION COUNSELING

## 2023-09-14 RX ORDER — DOXYCYCLINE HYCLATE 100 MG/1
1 CAPSULE, GELATIN COATED ORAL BID
Qty: 20 | Refills: 0 | Status: ERX | COMMUNITY
Start: 2023-09-14

## 2023-09-14 RX ADMIN — DOXYCYCLINE HYCLATE 1: 100 CAPSULE, GELATIN COATED ORAL at 00:00

## 2023-09-14 ASSESSMENT — LOCATION DETAILED DESCRIPTION DERM: LOCATION DETAILED: RIGHT DISTAL DORSAL FOREARM

## 2023-09-14 ASSESSMENT — LOCATION ZONE DERM: LOCATION ZONE: ARM

## 2023-09-14 ASSESSMENT — LOCATION SIMPLE DESCRIPTION DERM: LOCATION SIMPLE: RIGHT FOREARM

## 2023-09-14 NOTE — PROCEDURE: SUTURE REMOVAL (GLOBAL PERIOD)
Detail Level: Detailed
Add 92131 Cpt? (Important Note: In 2017 The Use Of 60820 Is Being Tracked By Cms To Determine Future Global Period Reimbursement For Global Periods): no

## 2023-09-14 NOTE — PROCEDURE: MEDICATION COUNSELING
Include Pregnancy/Lactation Warning?: No
Detail Level: Simple
Azithromycin Counseling:  I discussed with the patient the risks of azithromycin including but not limited to GI upset, allergic reaction, drug rash, diarrhea, and yeast infections.
Azithromycin Pregnancy And Lactation Text: This medication is considered safe during pregnancy and is also secreted in breast milk.
Bactrim Counseling:  I discussed with the patient the risks of sulfa antibiotics including but not limited to GI upset, allergic reaction, drug rash, diarrhea, dizziness, photosensitivity, and yeast infections.  Rarely, more serious reactions can occur including but not limited to aplastic anemia, agranulocytosis, methemoglobinemia, blood dyscrasias, liver or kidney failure, lung infiltrates or desquamative/blistering drug rashes.
Bactrim Pregnancy And Lactation Text: This medication is Pregnancy Category D and is known to cause fetal risk.  It is also excreted in breast milk.
Cephalexin Counseling: I counseled the patient regarding use of cephalexin as an antibiotic for prophylactic and/or therapeutic purposes. Cephalexin (commonly prescribed under brand name Keflex) is a cephalosporin antibiotic which is active against numerous classes of bacteria, including most skin bacteria. Side effects may include nausea, diarrhea, gastrointestinal upset, rash, hives, yeast infections, and in rare cases, hepatitis, kidney disease, seizures, fever, confusion, neurologic symptoms, and others. Patients with severe allergies to penicillin medications are cautioned that there is about a 10% incidence of cross-reactivity with cephalosporins. When possible, patients with penicillin allergies should use alternatives to cephalosporins for antibiotic therapy.
Cephalexin Pregnancy And Lactation Text: This medication is Pregnancy Category B and considered safe during pregnancy.  It is also excreted in breast milk but can be used safely for shorter doses.
Clindamycin Counseling: I counseled the patient regarding use of clindamycin as an antibiotic for prophylactic and/or therapeutic purposes. Clindamycin is active against numerous classes of bacteria, including skin bacteria. Side effects may include nausea, diarrhea, gastrointestinal upset, rash, hives, yeast infections, and in rare cases, colitis.
Clindamycin Pregnancy And Lactation Text: This medication can be used in pregnancy if certain situations. Clindamycin is also present in breast milk.
Doxycycline Counseling:  Patient counseled regarding possible photosensitivity and increased risk for sunburn.  Patient instructed to avoid sunlight, if possible.  When exposed to sunlight, patients should wear protective clothing, sunglasses, and sunscreen.  The patient was instructed to call the office immediately if the following severe adverse effects occur:  hearing changes, easy bruising/bleeding, severe headache, or vision changes.  The patient verbalized understanding of the proper use and possible adverse effects of doxycycline.  All of the patient's questions and concerns were addressed.
Doxycycline Pregnancy And Lactation Text: This medication is Pregnancy Category D and not consider safe during pregnancy. It is also excreted in breast milk but is considered safe for shorter treatment courses.
Erythromycin Counseling:  I discussed with the patient the risks of erythromycin including but not limited to GI upset, allergic reaction, drug rash, diarrhea, increase in liver enzymes, and yeast infections.
Erythromycin Pregnancy And Lactation Text: This medication is Pregnancy Category B and is considered safe during pregnancy. It is also excreted in breast milk.
Metronidazole Counseling:  I discussed with the patient the risks of metronidazole including but not limited to seizures, nausea/vomiting, a metallic taste in the mouth, nausea/vomiting and severe allergy.
Metronidazole Pregnancy And Lactation Text: This medication is Pregnancy Category B and considered safe during pregnancy.  It is also excreted in breast milk.
Minocycline Counseling: Patient advised regarding possible photosensitivity and discoloration of the teeth, skin, lips, tongue and gums.  Patient instructed to avoid sunlight, if possible.  When exposed to sunlight, patients should wear protective clothing, sunglasses, and sunscreen.  The patient was instructed to call the office immediately if the following severe adverse effects occur:  hearing changes, easy bruising/bleeding, severe headache, or vision changes.  The patient verbalized understanding of the proper use and possible adverse effects of minocycline.  All of the patient's questions and concerns were addressed.
Minocycline Pregnancy And Lactation Text: This medication is Pregnancy Category D and not consider safe during pregnancy. It is also excreted in breast milk.
Quinolones Counseling:  I discussed with the patient the risks of fluoroquinolones including but not limited to GI upset, allergic reaction, drug rash, diarrhea, dizziness, photosensitivity, yeast infections, liver function test abnormalities, tendonitis/tendon rupture.
Quinolones Pregnancy And Lactation Text: This medication is Pregnancy Category C and it isn't know if it is safe during pregnancy. It is also excreted in breast milk.
Rifampin Counseling: I discussed with the patient the risks of rifampin including but not limited to liver damage, kidney damage, red-orange body fluids, nausea/vomiting and severe allergy.
Rifampin Pregnancy And Lactation Text: This medication is Pregnancy Category C and it isn't know if it is safe during pregnancy. It is also excreted in breast milk and should not be used if you are breast feeding.
Sarecycline Counseling: Patient advised regarding possible photosensitivity and discoloration of the teeth, skin, lips, tongue and gums.  Patient instructed to avoid sunlight, if possible.  When exposed to sunlight, patients should wear protective clothing, sunglasses, and sunscreen.  The patient was instructed to call the office immediately if the following severe adverse effects occur:  hearing changes, easy bruising/bleeding, severe headache, or vision changes.  The patient verbalized understanding of the proper use and possible adverse effects of sarecycline.  All of the patient's questions and concerns were addressed.
Tetracycline Counseling: Patient counseled regarding possible photosensitivity and increased risk for sunburn.  Patient instructed to avoid sunlight, if possible.  When exposed to sunlight, patients should wear protective clothing, sunglasses, and sunscreen.  The patient was instructed to call the office immediately if the following severe adverse effects occur:  hearing changes, easy bruising/bleeding, severe headache, or vision changes.  The patient verbalized understanding of the proper use and possible adverse effects of tetracycline.  All of the patient's questions and concerns were addressed. Patient understands to avoid pregnancy while on therapy due to potential birth defects.
Fluconazole Counseling:  Patient counseled regarding adverse effects of fluconazole including but not limited to headache, diarrhea, nausea, upset stomach, liver function test abnormalities, taste disturbance, and stomach pain.  There is a rare possibility of liver failure that can occur when taking fluconazole.  The patient understands that monitoring of LFTs and kidney function test may be required, especially at baseline. The patient verbalized understanding of the proper use and possible adverse effects of fluconazole.  All of the patient's questions and concerns were addressed.
Griseofulvin Counseling:  I discussed with the patient the risks of griseofulvin including but not limited to photosensitivity, cytopenia, liver damage, nausea/vomiting and severe allergy.  The patient understands that this medication is best absorbed when taken with a fatty meal (e.g., ice cream or french fries).
Griseofulvin Pregnancy And Lactation Text: This medication is Pregnancy Category X and is known to cause serious birth defects. It is unknown if this medication is excreted in breast milk but breast feeding should be avoided.
Itraconazole Counseling:  I discussed with the patient the risks of itraconazole including but not limited to liver damage, nausea/vomiting, neuropathy, and severe allergy.  The patient understands that this medication is best absorbed when taken with acidic beverages such as non-diet cola or ginger ale.  The patient understands that monitoring is required including baseline LFTs and repeat LFTs at intervals.  The patient understands that they are to contact us or the primary physician if concerning signs are noted.
Ketoconazole Counseling:   Patient counseled regarding improving absorption with orange juice.  Adverse effects include but are not limited to breast enlargement, headache, diarrhea, nausea, upset stomach, liver function test abnormalities, taste disturbance, and stomach pain.  There is a rare possibility of liver failure that can occur when taking ketoconazole. The patient understands that monitoring of LFTs may be required, especially at baseline. The patient verbalized understanding of the proper use and possible adverse effects of ketoconazole.  All of the patient's questions and concerns were addressed.
Ketoconazole Pregnancy And Lactation Text: This medication is Pregnancy Category C and it isn't know if it is safe during pregnancy. It is also excreted in breast milk and breast feeding isn't recommended.
Terbinafine Counseling: Patient counseling regarding adverse effects of terbinafine including but not limited to headache, diarrhea, rash, upset stomach, liver function test abnormalities, itching, taste/smell disturbance, nausea, abdominal pain, and flatulence.  There is a rare possibility of liver failure that can occur when taking terbinafine.  The patient understands that a baseline LFT and kidney function test may be required. The patient verbalized understanding of the proper use and possible adverse effects of terbinafine.  All of the patient's questions and concerns were addressed.
Terbinafine Pregnancy And Lactation Text: This medication is Pregnancy Category B and is considered safe during pregnancy. It is also excreted in breast milk and breast feeding isn't recommended.
Cimetidine Counseling:  I discussed with the patient the risks of Cimetidine including but not limited to gynecomastia, headache, diarrhea, nausea, drowsiness, arrhythmias, pancreatitis, skin rashes, psychosis, bone marrow suppression and kidney toxicity.
Doxepin Counseling:  Patient advised that the medication is sedating and not to drive a car after taking this medication. Patient informed of potential adverse effects including but not limited to dry mouth, urinary retention, and blurry vision.  The patient verbalized understanding of the proper use and possible adverse effects of doxepin.  All of the patient's questions and concerns were addressed.
Doxepin Pregnancy And Lactation Text: This medication is Pregnancy Category C and it isn't known if it is safe during pregnancy. It is also excreted in breast milk and breast feeding isn't recommended.
Hydroxyzine Counseling: Patient advised that the medication is sedating and not to drive a car after taking this medication.  Patient informed of potential adverse effects including but not limited to dry mouth, urinary retention, and blurry vision.  The patient verbalized understanding of the proper use and possible adverse effects of hydroxyzine.  All of the patient's questions and concerns were addressed.
Hydroxyzine Pregnancy And Lactation Text: This medication is not safe during pregnancy and should not be taken. It is also excreted in breast milk and breast feeding isn't recommended.
Albendazole Counseling:  I discussed with the patient the risks of albendazole including but not limited to cytopenia, kidney damage, nausea/vomiting and severe allergy.  The patient understands that this medication is being used in an off-label manner.
Albendazole Pregnancy And Lactation Text: This medication is Pregnancy Category C and it isn't known if it is safe during pregnancy. It is also excreted in breast milk.
Ivermectin Counseling:  Patient instructed to take medication on an empty stomach with a full glass of water.  Patient informed of potential adverse effects including but not limited to nausea, diarrhea, dizziness, itching, and swelling of the extremities or lymph nodes.  The patient verbalized understanding of the proper use and possible adverse effects of ivermectin.  All of the patient's questions and concerns were addressed.
Adbry Counseling: I discussed with the patient the risks of tralokinumab including but not limited to eye infection and irritation, cold sores, injection site reactions, worsening of asthma, allergic reactions and increased risk of parasitic infection.  Live vaccines should be avoided while taking tralokinumab. The patient understands that monitoring is required and they must alert us or the primary physician if symptoms of infection or other concerning signs are noted.
Adbry Pregnancy And Lactation Text: It is unknown if this medication will adversely affect pregnancy or breast feeding.
Cimzia Counseling:  I discussed with the patient the risks of Cimzia including but not limited to immunosuppression, allergic reactions and infections.  The patient understands that monitoring is required including a PPD at baseline and must alert us or the primary physician if symptoms of infection or other concerning signs are noted.
Cimzia Pregnancy And Lactation Text: This medication crosses the placenta but can be considered safe in certain situations. Cimzia may be excreted in breast milk.
Cosentyx Counseling:  I discussed with the patient the risks of Cosentyx including but not limited to worsening of Crohn's disease, immunosuppression, allergic reactions and infections.  The patient understands that monitoring is required including a PPD at baseline and must alert us or the primary physician if symptoms of infection or other concerning signs are noted.
Cosentyx Pregnancy And Lactation Text: This medication is Pregnancy Category B and is considered safe during pregnancy. It is unknown if this medication is excreted in breast milk.
Dupixent Counseling: I discussed with the patient the risks of dupilumab including but not limited to eye inflammation and irritation, cold sores, injection site reactions, allergic reactions and increased risk of parasitic infection. The patient understands that monitoring is required and they must alert us or the primary physician if symptoms of infection or other concerning signs are noted.
Dupixent Pregnancy And Lactation Text: This medication likely crosses the placenta but the risk for the fetus is uncertain. This medication is excreted in breast milk.
Enbrel Counseling:  I discussed with the patient the risks of etanercept including but not limited to myelosuppression, immunosuppression, autoimmune hepatitis, demyelinating diseases, lymphoma, and infections.  The patient understands that monitoring is required including a PPD at baseline and must alert us or the primary physician if symptoms of infection or other concerning signs are noted.
Humira Counseling:  I discussed with the patient the risks of adalimumab including but not limited to myelosuppression, immunosuppression, autoimmune hepatitis, demyelinating diseases, lymphoma, and serious infections.  The patient understands that monitoring is required including a PPD at baseline and must alert us or the primary physician if symptoms of infection or other concerning signs are noted.
Ilumya Counseling: I discussed with the patient the risks of tildrakizumab including but not limited to immunosuppression, malignancy, posterior leukoencephalopathy syndrome, and serious infections.  The patient understands that monitoring is required including a PPD at baseline and must alert us or the primary physician if symptoms of infection or other concerning signs are noted.
Ilumya Pregnancy And Lactation Text: The risk during pregnancy and breastfeeding is uncertain with this medication.
Infliximab Counseling:  I discussed with the patient the risks of infliximab including but not limited to myelosuppression, immunosuppression, autoimmune hepatitis, demyelinating diseases, lymphoma, and serious infections.  The patient understands that monitoring is required including a PPD at baseline and must alert us or the primary physician if symptoms of infection or other concerning signs are noted.
Rituxan Counseling:  I discussed with the patient the risks of Rituxan infusions. Side effects can include infusion reactions, severe drug rashes including mucocutaneous reactions, reactivation of latent hepatitis and other infections and rarely progressive multifocal leukoencephalopathy.  All of the patient's questions and concerns were addressed.
Rituxan Pregnancy And Lactation Text: This medication is Pregnancy Category C and it isn't know if it is safe during pregnancy. It is unknown if this medication is excreted in breast milk but similar antibodies are known to be excreted.
Siliq Counseling:  I discussed with the patient the risks of Siliq including but not limited to new or worsening depression, suicidal thoughts and behavior, immunosuppression, malignancy, posterior leukoencephalopathy syndrome, and serious infections.  The patient understands that monitoring is required including a PPD at baseline and must alert us or the primary physician if symptoms of infection or other concerning signs are noted. There is also a special program designed to monitor depression which is required with Siliq.
Simponi Counseling:  I discussed with the patient the risks of golimumab including but not limited to myelosuppression, immunosuppression, autoimmune hepatitis, demyelinating diseases, lymphoma, and serious infections.  The patient understands that monitoring is required including a PPD at baseline and must alert us or the primary physician if symptoms of infection or other concerning signs are noted.
Skyrizi Counseling: I discussed with the patient the risks of risankizumab-rzaa including but not limited to immunosuppression, and serious infections.  The patient understands that monitoring is required including a PPD at baseline and must alert us or the primary physician if symptoms of infection or other concerning signs are noted.
Stelara Counseling:  I discussed with the patient the risks of ustekinumab including but not limited to immunosuppression, malignancy, posterior leukoencephalopathy syndrome, and serious infections.  The patient understands that monitoring is required including a PPD at baseline and must alert us or the primary physician if symptoms of infection or other concerning signs are noted.
Taltz Counseling: I discussed with the patient the risks of ixekizumab including but not limited to immunosuppression, serious infections, worsening of inflammatory bowel disease and drug reactions.  The patient understands that monitoring is required including a PPD at baseline and must alert us or the primary physician if symptoms of infection or other concerning signs are noted.
Tremfya Counseling: I discussed with the patient the risks of guselkumab including but not limited to immunosuppression, serious infections, and drug reactions.  The patient understands that monitoring is required including a PPD at baseline and must alert us or the primary physician if symptoms of infection or other concerning signs are noted.
Xolair Counseling:  Patient informed of potential adverse effects including but not limited to fever, muscle aches, rash and allergic reactions.  The patient verbalized understanding of the proper use and possible adverse effects of Xolair.  All of the patient's questions and concerns were addressed.
Xolair Pregnancy And Lactation Text: This medication is Pregnancy Category B and is considered safe during pregnancy. This medication is excreted in breast milk.
Erivedge Counseling- I discussed with the patient the risks of Erivedge including but not limited to nausea, vomiting, diarrhea, constipation, weight loss, changes in the sense of taste, decreased appetite, muscle spasms, and hair loss.  The patient verbalized understanding of the proper use and possible adverse effects of Erivedge.  All of the patient's questions and concerns were addressed.
Erivedge Pregnancy And Lactation Text: This medication is Pregnancy Category X and is absolutely contraindicated during pregnancy. It is unknown if it is excreted in breast milk.
Libtayo Counseling- I discussed with the patient the risks of Libtayo including but not limited to nausea, vomiting, diarrhea, and bone or muscle pain.  The patient verbalized understanding of the proper use and possible adverse effects of Libtayo.  All of the patient's questions and concerns were addressed.
Libtayo Pregnancy And Lactation Text: This medication is contraindicated in pregnancy and when breast feeding.
Odomzo Counseling- I discussed with the patient the risks of Odomzo including but not limited to nausea, vomiting, diarrhea, constipation, weight loss, changes in the sense of taste, decreased appetite, muscle spasms, and hair loss.  The patient verbalized understanding of the proper use and possible adverse effects of Odomzo.  All of the patient's questions and concerns were addressed.
Dutasteride Male Counseling: Dustasteride Counseling:  I discussed with the patient the risks of use of dutasteride including but not limited to decreased libido, decreased ejaculate volume, and gynecomastia. Women who can become pregnant should not handle medication.  All of the patient's questions and concerns were addressed.
Dutasteride Pregnancy And Lactation Text: This medication is absolutely contraindicated in women, especially during pregnancy and breast feeding. Feminization of male fetuses is possible if taking while pregnant.
Finasteride Male Counseling: Finasteride Counseling:  I discussed with the patient the risks of use of finasteride including but not limited to decreased libido, decreased ejaculate volume, gynecomastia, and depression. Women should not handle medication.  All of the patient's questions and concerns were addressed.
Finasteride Pregnancy And Lactation Text: This medication is absolutely contraindicated during pregnancy. It is unknown if it is excreted in breast milk.
Birth Control Pills Counseling: Birth Control Pill Counseling: I discussed with the patient the potential side effects of OCPs including but not limited to increased risk of stroke, heart attack, thrombophlebitis, deep venous thrombosis, hepatic adenomas, breast changes, GI upset, headaches, and depression.  The patient verbalized understanding of the proper use and possible adverse effects of OCPs. All of the patient's questions and concerns were addressed.
Birth Control Pills Pregnancy And Lactation Text: This medication should be avoided if pregnant and for the first 30 days post-partum.
Spironolactone Counseling: Patient advised regarding risks of diarrhea, abdominal pain, hyperkalemia, birth defects (for female patients), liver toxicity and renal toxicity. The patient may need blood work to monitor liver and kidney function and potassium levels while on therapy. The patient verbalized understanding of the proper use and possible adverse effects of spironolactone.  All of the patient's questions and concerns were addressed.
Spironolactone Pregnancy And Lactation Text: This medication can cause feminization of the male fetus and should be avoided during pregnancy. The active metabolite is also found in breast milk.
Azathioprine Counseling:  I discussed with the patient the risks of azathioprine including but not limited to myelosuppression, immunosuppression, hepatotoxicity, lymphoma, and infections.  The patient understands that monitoring is required including baseline LFTs, Creatinine, possible TPMP genotyping and weekly CBCs for the first month and then every 2 weeks thereafter.  The patient verbalized understanding of the proper use and possible adverse effects of azathioprine.  All of the patient's questions and concerns were addressed.
Azathioprine Pregnancy And Lactation Text: This medication is Pregnancy Category D and isn't considered safe during pregnancy. It is unknown if this medication is excreted in breast milk.
Cellcept Counseling:  I discussed with the patient the risks of mycophenolate mofetil including but not limited to infection/immunosuppression, GI upset, hypokalemia, hypercholesterolemia, bone marrow suppression, lymphoproliferative disorders, malignancy, GI ulceration/bleed/perforation, colitis, interstitial lung disease, kidney failure, progressive multifocal leukoencephalopathy, and birth defects.  The patient understands that monitoring is required including a baseline creatinine and regular CBC testing. In addition, patient must alert us immediately if symptoms of infection or other concerning signs are noted.
Cyclophosphamide Counseling:  I discussed with the patient the risks of cyclophosphamide including but not limited to hair loss, hormonal abnormalities, decreased fertility, abdominal pain, diarrhea, nausea and vomiting, bone marrow suppression and infection. The patient understands that monitoring is required while taking this medication.
Cyclophosphamide Pregnancy And Lactation Text: This medication is Pregnancy Category D and it isn't considered safe during pregnancy. This medication is excreted in breast milk.
Cyclosporine Counseling:  I discussed with the patient the risks of cyclosporine including but not limited to hypertension, gingival hyperplasia,myelosuppression, immunosuppression, liver damage, kidney damage, neurotoxicity, lymphoma, and serious infections. The patient understands that monitoring is required including baseline blood pressure, CBC, CMP, lipid panel and uric acid, and then 1-2 times monthly CMP and blood pressure.
Cyclosporine Pregnancy And Lactation Text: This medication is Pregnancy Category C and it isn't know if it is safe during pregnancy. This medication is excreted in breast milk.
Methotrexate Counseling:  Patient counseled regarding adverse effects of methotrexate including but not limited to nausea, vomiting, abnormalities in liver function tests. Patients may develop mouth sores, rash, diarrhea, and abnormalities in blood counts. The patient understands that monitoring is required including LFT's and blood counts.  There is a rare possibility of scarring of the liver and lung problems that can occur when taking methotrexate. Persistent nausea, loss of appetite, pale stools, dark urine, cough, and shortness of breath should be reported immediately. Patient advised to discontinue methotrexate treatment at least three months before attempting to become pregnant.  I discussed the need for folate supplements while taking methotrexate.  These supplements can decrease side effects during methotrexate treatment. The patient verbalized understanding of the proper use and possible adverse effects of methotrexate.  All of the patient's questions and concerns were addressed.
Methotrexate Pregnancy And Lactation Text: This medication is Pregnancy Category X and is known to cause fetal harm. This medication is excreted in breast milk.
Prednisone Counseling:  I discussed with the patient the risks of prolonged use of prednisone including but not limited to weight gain, insomnia, osteoporosis, mood changes, diabetes, susceptibility to infection, glaucoma and high blood pressure.  In cases where prednisone use is prolonged, patients should be monitored with blood pressure checks, serum glucose levels and an eye exam.  Additionally, the patient may need to be placed on GI prophylaxis, PCP prophylaxis, and calcium and vitamin D supplementation and/or a bisphosphonate.  The patient verbalized understanding of the proper use and the possible adverse effects of prednisone.  All of the patient's questions and concerns were addressed.
Cibinqo Counseling: I discussed with the patient the risks of Cibinqo therapy including but not limited to common cold, nausea, headache, cold sores, increased blood CPK levels, dizziness, UTIs, fatigue, acne, and vomitting. Live vaccines should be avoided.  This medication has been linked to serious infections; higher rate of mortality; malignancy and lymphoproliferative disorders; major adverse cardiovascular events; thrombosis; thrombocytopenia and lymphopenia; lipid elevations; and retinal detachment.
Cibinqo Pregnancy And Lactation Text: It is unknown if this medication will adversely affect pregnancy or breast feeding.  You should not take this medication if you are currently pregnant or planning a pregnancy or while breastfeeding.
Olumiant Counseling: I discussed with the patient the risks of Olumiant therapy including but not limited to upper respiratory tract infections, shingles, cold sores, and nausea. Live vaccines should be avoided.  This medication has been linked to serious infections; higher rate of mortality; malignancy and lymphoproliferative disorders; major adverse cardiovascular events; thrombosis; gastrointestinal perforations; neutropenia; lymphopenia; anemia; liver enzyme elevations; and lipid elevations.
Olumiant Pregnancy And Lactation Text: Based on animal studies, Olumiant may cause embryo-fetal harm when administered to pregnant women.  The medication should not be used in pregnancy.  Breastfeeding is not recommended during treatment.
Rinvoq Counseling: I discussed with the patient the risks of Rinvoq therapy including but not limited to upper respiratory tract infections, shingles, cold sores, bronchitis, nausea, cough, fever, acne, and headache. Live vaccines should be avoided.  This medication has been linked to serious infections; higher rate of mortality; malignancy and lymphoproliferative disorders; major adverse cardiovascular events; thrombosis; thrombocytopenia, anemia, and neutropenia; lipid elevations; liver enzyme elevations; and gastrointestinal perforations.
Rinvoq Pregnancy And Lactation Text: Based on animal studies, Rinvoq may cause embryo-fetal harm when administered to pregnant women.  The medication should not be used in pregnancy.  Breastfeeding is not recommended during treatment and for 6 days after the last dose.
Sotyktu Counseling:  I discussed the most common side effects of Sotyktu including: common cold, sore throat, sinus infections, cold sores, canker sores, folliculitis, and acne.  I also discussed more serious side effects of Sotyktu including but not limited to: serious allergic reactions; increased risk for infections such as TB; cancers such as lymphomas; rhabdomyolysis and elevated CPK; and elevated triglycerides and liver enzymes. 
Sotyktu Pregnancy And Lactation Text: There is insufficient data to evaluate whether or not Sotyktu is safe to use during pregnancy.   It is not known if Sotyktu passes into breast milk and whether or not it is safe to use when breastfeeding.  
Xeljanz Counseling: I discussed with the patient the risks of Xeljanz therapy including increased risk of infection, liver issues, headache, diarrhea, or cold symptoms. Live vaccines should be avoided. They were instructed to call if they have any problems.
Xelestherz Pregnancy And Lactation Text: This medication is Pregnancy Category D and is not considered safe during pregnancy.  The risk during breast feeding is also uncertain.
Acitretin Counseling:  I discussed with the patient the risks of acitretin including but not limited to hair loss, dry lips/skin/eyes, liver damage, hyperlipidemia, depression/suicidal ideation, photosensitivity.  Serious rare side effects can include but are not limited to pancreatitis, pseudotumor cerebri, bony changes, clot formation/stroke/heart attack.  Patient understands that alcohol is contraindicated since it can result in liver toxicity and significantly prolong the elimination of the drug by many years.
Acitretin Pregnancy And Lactation Text: This medication is Pregnancy Category X and should not be given to women who are pregnant or may become pregnant in the future. This medication is excreted in breast milk.
Bexarotene Counseling:  I discussed with the patient the risks of bexarotene including but not limited to hair loss, dry lips/skin/eyes, liver abnormalities, hyperlipidemia, pancreatitis, depression/suicidal ideation, photosensitivity, drug rash/allergic reactions, hypothyroidism, anemia, leukopenia, infection, cataracts, and teratogenicity.  Patient understands that they will need regular blood tests to check lipid profile, liver function tests, white blood cell count, thyroid function tests and pregnancy test if applicable.
Bexarotene Pregnancy And Lactation Text: This medication is Pregnancy Category X and should not be given to women who are pregnant or may become pregnant. This medication should not be used if you are breast feeding.
Isotretinoin Counseling: Patient should get monthly blood tests, not donate blood, not drive at night if vision affected, not share medication, and not undergo elective surgery for 6 months after tx completed. Side effects reviewed, pt to contact office should one occur.
Isotretinoin Pregnancy And Lactation Text: This medication is Pregnancy Category X and is considered extremely dangerous during pregnancy. It is unknown if it is excreted in breast milk.
High Dose Vitamin A Counseling: Side effects reviewed, pt to contact office should one occur.
High Dose Vitamin A Pregnancy And Lactation Text: High dose vitamin A therapy is contraindicated during pregnancy and breast feeding.
Aklief counseling:  Patient advised to apply a pea-sized amount only at bedtime and wait 30 minutes after washing their face before applying.  If too drying, patient may add a non-comedogenic moisturizer.  The most commonly reported side effects including irritation, redness, scaling, dryness, stinging, burning, itching, and increased risk of sunburn.  The patient verbalized understanding of the proper use and possible adverse effects of retinoids.  All of the patient's questions and concerns were addressed.
Aklief Pregnancy And Lactation Text: It is unknown if this medication is safe to use during pregnancy.  It is unknown if this medication is excreted in breast milk.  Breastfeeding women should use the topical cream on the smallest area of the skin for the shortest time needed while breastfeeding.  Do not apply to nipple and areola.
Azelaic Acid Counseling: Patient counseled that medicine may cause skin irritation and to avoid applying near the eyes.  In the event of skin irritation, the patient was advised to reduce the amount of the drug applied or use it less frequently.   The patient verbalized understanding of the proper use and possible adverse effects of azelaic acid.  All of the patient's questions and concerns were addressed.
Azelaic Acid Pregnancy And Lactation Text: This medication is considered safe during pregnancy and breast feeding.
Benzoyl Peroxide Counseling: Patient counseled that medicine may cause skin irritation and bleach clothing.  In the event of skin irritation, the patient was advised to reduce the amount of the drug applied or use it less frequently.   The patient verbalized understanding of the proper use and possible adverse effects of benzoyl peroxide.  All of the patient's questions and concerns were addressed.
Benzoyl Peroxide Pregnancy And Lactation Text: This medication is Pregnancy Category C. It is unknown if benzoyl peroxide is excreted in breast milk.
Carac Counseling:  I discussed with the patient the risks of Carac including but not limited to erythema, scaling, itching, weeping, crusting, and pain.
Carac Pregnancy And Lactation Text: This medication is Pregnancy Category X and contraindicated in pregnancy and in women who may become pregnant. It is unknown if this medication is excreted in breast milk.
Calcipotriene Counseling:  I discussed with the patient the risks of calcipotriene including but not limited to erythema, scaling, itching, and irritation.
Calcipotriene Pregnancy And Lactation Text: The use of this medication during pregnancy or lactation is not recommended as there is insufficient data.
Cantharidin Counseling:  I discussed with the patient the risks of Cantharidin including but not limited to pain, redness, burning, itching, and blistering.
Cantharidin Pregnancy And Lactation Text: This medication has not been proven safe during pregnancy. It is unknown if this medication is excreted in breast milk.
5-Fu Counseling: 5-Fluorouracil Counseling:  I discussed with the patient the risks of 5-fluorouracil including but not limited to erythema, scaling, itching, weeping, crusting, and pain.
Drysol Counseling:  I discussed with the patient the risks of drysol/aluminum chloride including but not limited to skin rash, itching, irritation, burning.
Elidel Counseling: Patient may experience a mild burning sensation during topical application. Elidel is not approved in children less than 2 years of age. There have been case reports of hematologic and skin malignancies in patients using topical calcineurin inhibitors although causality is questionable.
Elidel Pregnancy And Lactation Text: This medication is Pregnancy Category C. It is unknown if this medication is excreted in breast milk.
Eucrisa Counseling: Patient may experience a mild burning sensation during topical application. Eucrisa is not approved in children less than 3 months of age.
Eucrisa Pregnancy And Lactation Text: This medication has not been assigned a Pregnancy Risk Category but animal studies failed to show danger with the topical medication. It is unknown if the medication is excreted in breast milk.
Hydroquinone Counseling:  Patient advised that medication may result in skin irritation, lightening (hypopigmentation), dryness, and burning.  In the event of skin irritation, the patient was advised to reduce the amount of the drug applied or use it less frequently.  Rarely, spots that are treated with hydroquinone can become darker (pseudoochronosis).  Should this occur, patient instructed to stop medication and call the office. The patient verbalized understanding of the proper use and possible adverse effects of hydroquinone.  All of the patient's questions and concerns were addressed.
Imiquimod Counseling:  I discussed with the patient the risks of imiquimod including but not limited to erythema, scaling, itching, weeping, crusting, and pain.  Patient understands that the inflammatory response to imiquimod is variable from person to person and was educated regarded proper titration schedule.  If flu-like symptoms develop, patient knows to discontinue the medication and contact us.
Klisyri Counseling:  I discussed with the patient the risks of Klisyri including but not limited to erythema, scaling, itching, weeping, crusting, and pain.
Klisyri Pregnancy And Lactation Text: It is unknown if this medication can harm a developing fetus or if it is excreted in breast milk.
Minoxidil Counseling: Minoxidil is a topical medication which can increase blood flow where it is applied. It is uncertain how this medication increases hair growth. Side effects are uncommon and include stinging and allergic reactions.
Mirvaso Counseling: Mirvaso is a topical medication which can decrease superficial blood flow where applied. Side effects are uncommon and include stinging, redness and allergic reactions.
Mirvaso Pregnancy And Lactation Text: This medication has not been assigned a Pregnancy Risk Category. It is unknown if the medication is excreted in breast milk.
Opzelura Counseling:  I discussed with the patient the risks of Opzelura including but not limited to nasopharngitis, bronchitis, ear infection, eosinophila, hives, diarrhea, folliculitis, tonsillitis, and rhinorrhea.  Taken orally, this medication has been linked to serious infections; higher rate of mortality; malignancy and lymphoproliferative disorders; major adverse cardiovascular events; thrombosis; thrombocytopenia, anemia, and neutropenia; and lipid elevations.
Opzelura Pregnancy And Lactation Text: There is insufficient data to evaluate drug-associated risk for major birth defects, miscarriage, or other adverse maternal or fetal outcomes.  There is a pregnancy registry that monitors pregnancy outcomes in pregnant persons exposed to the medication during pregnancy.  It is unknown if this medication is excreted in breast milk.  Do not breastfeed during treatment and for about 4 weeks after the last dose.
Picato Counseling:  I discussed with the patient the risks of Picato including but not limited to erythema, scaling, itching, weeping, crusting, and pain.
Protopic Counseling: Patient may experience a mild burning sensation during topical application. Protopic is not approved in children less than 2 years of age. There have been case reports of hematologic and skin malignancies in patients using topical calcineurin inhibitors although causality is questionable.
Protopic Pregnancy And Lactation Text: This medication is Pregnancy Category C. It is unknown if this medication is excreted in breast milk when applied topically.
Qbrexza Counseling:  I discussed with the patient the risks of Qbrexza including but not limited to headache, mydriasis, blurred vision, dry eyes, nasal dryness, dry mouth, dry throat, dry skin, urinary hesitation, and constipation.  Local skin reactions including erythema, burning, stinging, and itching can also occur.
Qbrexza Pregnancy And Lactation Text: There is no available data on Qbrexza use in pregnant women.  There is no available data on Qbrexza use in lactation.
Rhofade Counseling: Rhofade is a topical medication which can decrease superficial blood flow where applied. Side effects are uncommon and include stinging, redness and allergic reactions.
Solaraze Counseling:  I discussed with the patient the risks of Solaraze including but not limited to erythema, scaling, itching, weeping, crusting, and pain.
Solaraze Pregnancy And Lactation Text: This medication is Pregnancy Category B and is considered safe. There is some data to suggest avoiding during the third trimester. It is unknown if this medication is excreted in breast milk.
Soolantra Counseling: I discussed with the patients the risks of topial Soolantra. This is a medicine which decreases the number of mites and inflammation in the skin. You experience burning, stinging, eye irritation or allergic reactions.  Please call our office if you develop any problems from using this medication.
Soolantra Pregnancy And Lactation Text: This medication is Pregnancy Category C. This medication is considered safe during breast feeding.
Topical Retinoid counseling:  Patient advised to apply a pea-sized amount only at bedtime and wait 30 minutes after washing their face before applying.  If too drying, patient may add a non-comedogenic moisturizer. The patient verbalized understanding of the proper use and possible adverse effects of retinoids.  All of the patient's questions and concerns were addressed.
Tazorac Counseling:  Patient advised that medication is irritating and drying.  Patient may need to apply sparingly and wash off after an hour before eventually leaving it on overnight.  The patient verbalized understanding of the proper use and possible adverse effects of tazorac.  All of the patient's questions and concerns were addressed.
Tazorac Pregnancy And Lactation Text: This medication is not safe during pregnancy. It is unknown if this medication is excreted in breast milk.
Topical Clindamycin Counseling: Patient counseled that this medication may cause skin irritation or allergic reactions.  In the event of skin irritation, the patient was advised to reduce the amount of the drug applied or use it less frequently.   The patient verbalized understanding of the proper use and possible adverse effects of clindamycin.  All of the patient's questions and concerns were addressed.
Topical Clindamycin Pregnancy And Lactation Text: This medication is Pregnancy Category B and is considered safe during pregnancy. It is unknown if it is excreted in breast milk.
Topical Ketoconazole Counseling: Patient counseled that this medication may cause skin irritation or allergic reactions.  In the event of skin irritation, the patient was advised to reduce the amount of the drug applied or use it less frequently.   The patient verbalized understanding of the proper use and possible adverse effects of ketoconazole.  All of the patient's questions and concerns were addressed.
Topical Metronidazole Counseling: Metronidazole is a topical antibiotic medication. You may experience burning, stinging, redness, or allergic reactions.  Please call our office if you develop any problems from using this medication.
Topical Metronidazole Pregnancy And Lactation Text: This medication is Pregnancy Category B and considered safe during pregnancy.  It is also considered safe to use while breastfeeding.
Topical Steroids Counseling: I discussed with the patient that prolonged use of topical steroids can result in the increased appearance of superficial blood vessels (telangiectasias), lightening (hypopigmentation) and thinning of the skin (atrophy).  Patient understands to avoid using high potency steroids in skin folds, the groin or the face.  The patient verbalized understanding of the proper use and possible adverse effects of topical steroids.  All of the patient's questions and concerns were addressed.
Topical Steroids Applications Pregnancy And Lactation Text: Most topical steroids are considered safe to use during pregnancy and lactation.  Any topical steroid applied to the breast or nipple should be washed off before breastfeeding.
Topical Sulfur Applications Counseling: Topical Sulfur Counseling: Patient counseled that this medication may cause skin irritation or allergic reactions.  In the event of skin irritation, the patient was advised to reduce the amount of the drug applied or use it less frequently.   The patient verbalized understanding of the proper use and possible adverse effects of topical sulfur application.  All of the patient's questions and concerns were addressed.
Topical Sulfur Applications Pregnancy And Lactation Text: This medication is considered safe during pregnancy and breast feeding secondary to limited systemic absorption.
Wartpeel Counseling:  I discussed with the patient the risks of Wartpeel including but not limited to erythema, scaling, itching, weeping, crusting, and pain.
Winlevi Counseling:  I discussed with the patient the risks of topical clascoterone including but not limited to erythema, scaling, itching, and stinging. Patient voiced their understanding.
Winlevi Pregnancy And Lactation Text: This medication is considered safe during pregnancy and breastfeeding.
VTAMA Counseling: I discussed with the patient that VTAMA is not for use in the eyes, mouth or mouth. They should call the office if they develop any signs of allergic reactions to VTAMA. The patient verbalized understanding of the proper use and possible adverse effects of VTAMA.  All of the patient's questions and concerns were addressed.
Vtama Pregnancy And Lactation Text: It is unknown if this medication can cause problems during pregnancy and breastfeeding.
Zoryve Counseling:  I discussed with the patient that Zoryve is not for use in the eyes, mouth or vagina. The most commonly reported side effects include diarrhea, headache, insomnia, application site pain, upper respiratory tract infections, and urinary tract infections.  All of the patient's questions and concerns were addressed.
Zyclara Counseling:  I discussed with the patient the risks of imiquimod including but not limited to erythema, scaling, itching, weeping, crusting, and pain.  Patient understands that the inflammatory response to imiquimod is variable from person to person and was educated regarded proper titration schedule.  If flu-like symptoms develop, patient knows to discontinue the medication and contact us.
Opioid Counseling: I discussed with the patient the potential side effects of opioids including but not limited to addiction, altered mental status, and depression. I stressed avoiding alcohol, benzodiazepines, muscle relaxants and sleep aids unless specifically okayed by a physician. The patient verbalized understanding of the proper use and possible adverse effects of opioids. All of the patient's questions and concerns were addressed. They were instructed to flush the remaining pills down the toilet if they did not need them for pain.
Opioid Pregnancy And Lactation Text: These medications can lead to premature delivery and should be avoided during pregnancy. These medications are also present in breast milk in small amounts.
Arava Counseling:  Patient counseled regarding adverse effects of Arava including but not limited to nausea, vomiting, abnormalities in liver function tests. Patients may develop mouth sores, rash, diarrhea, and abnormalities in blood counts. The patient understands that monitoring is required including LFTs and blood counts.  There is a rare possibility of scarring of the liver and lung problems that can occur when taking methotrexate. Persistent nausea, loss of appetite, pale stools, dark urine, cough, and shortness of breath should be reported immediately. Patient advised to discontinue Arava treatment and consult with a physician prior to attempting conception. The patient will have to undergo a treatment to eliminate Arava from the body prior to conception.
Clofazimine Counseling:  I discussed with the patient the risks of clofazimine including but not limited to skin and eye pigmentation, liver damage, nausea/vomiting, gastrointestinal bleeding and allergy.
Clofazimine Pregnancy And Lactation Text: This medication is Pregnancy Category C and isn't considered safe during pregnancy. It is excreted in breast milk.
Colchicine Counseling:  Patient counseled regarding adverse effects including but not limited to stomach upset (nausea, vomiting, stomach pain, or diarrhea).  Patient instructed to limit alcohol consumption while taking this medication.  Colchicine may reduce blood counts especially with prolonged use.  The patient understands that monitoring of kidney function and blood counts may be required, especially at baseline. The patient verbalized understanding of the proper use and possible adverse effects of colchicine.  All of the patient's questions and concerns were addressed.
Dapsone Counseling: I discussed with the patient the risks of dapsone including but not limited to hemolytic anemia, agranulocytosis, rashes, methemoglobinemia, kidney failure, peripheral neuropathy, headaches, GI upset, and liver toxicity.  Patients who start dapsone require monitoring including baseline LFTs and weekly CBCs for the first month, then every month thereafter.  The patient verbalized understanding of the proper use and possible adverse effects of dapsone.  All of the patient's questions and concerns were addressed.
Dapsone Pregnancy And Lactation Text: This medication is Pregnancy Category C and is not considered safe during pregnancy or breast feeding.
Gabapentin Counseling: I discussed with the patient the risks of gabapentin including but not limited to dizziness, somnolence, fatigue and ataxia.
Glycopyrrolate Counseling:  I discussed with the patient the risks of glycopyrrolate including but not limited to skin rash, drowsiness, dry mouth, difficulty urinating, and blurred vision.
Glycopyrrolate Pregnancy And Lactation Text: This medication is Pregnancy Category B and is considered safe during pregnancy. It is unknown if it is excreted breast milk.
Hydroxychloroquine Counseling:  I discussed with the patient that a baseline ophthalmologic exam is needed at the start of therapy and every year thereafter while on therapy. A CBC may also be warranted for monitoring.  The side effects of this medication were discussed with the patient, including but not limited to agranulocytosis, aplastic anemia, seizures, rashes, retinopathy, and liver toxicity. Patient instructed to call the office should any adverse effect occur.  The patient verbalized understanding of the proper use and possible adverse effects of Plaquenil.  All the patient's questions and concerns were addressed.
Hydroxychloroquine Pregnancy And Lactation Text: This medication has been shown to cause fetal harm but it isn't assigned a Pregnancy Risk Category. There are small amounts excreted in breast milk.
Low Dose Naltrexone Counseling- I discussed with the patient the potential risks and side effects of low dose naltrexone including but not limited to: more vivid dreams, headaches, nausea, vomiting, abdominal pain, fatigue, dizziness, and anxiety.
Low Dose Naltrexone Pregnancy And Lactation Text: Naltrexone is pregnancy category C.  There have been no adequate and well-controlled studies in pregnant women.  It should be used in pregnancy only if the potential benefit justifies the potential risk to the fetus.   Limited data indicates that naltrexone is minimally excreted into breastmilk.
Niacinamide Counseling: I recommended taking niacin or niacinamide, also know as vitamin B3, twice daily. Recent evidence suggests that taking vitamin B3 (500 mg twice daily) can reduce the risk of actinic keratoses and non-melanoma skin cancers. Side effects of vitamin B3 include flushing and headache.
Niacinamide Pregnancy And Lactation Text: These medications are considered safe during pregnancy.
Nsaids Counseling: NSAID Counseling: I discussed with the patient that NSAIDs should be taken with food. Prolonged use of NSAIDs can result in the development of stomach ulcers.  Patient advised to stop taking NSAIDs if abdominal pain occurs.  The patient verbalized understanding of the proper use and possible adverse effects of NSAIDs.  All of the patient's questions and concerns were addressed.
Nsaids Pregnancy And Lactation Text: These medications are considered safe up to 30 weeks gestation. It is excreted in breast milk.
Olanzapine Counseling- I discussed with the patient the common side effects of olanzapine including but are not limited to: lack of energy, dry mouth, increased appetite, sleepiness, tremor, constipation, dizziness, changes in behavior, or restlessness.  Explained that teenagers are more likely to experience headaches, abdominal pain, pain in the arms or legs, tiredness, and sleepiness.  Serious side effects include but are not limited: increased risk of death in elderly patients who are confused, have memory loss, or dementia-related psychosis; hyperglycemia; increased cholesterol and triglycerides; and weight gain.
Olanzapine Pregnancy And Lactation Text: This medication is pregnancy category C.   There are no adequate and well controlled trials with olanzapine in pregnant females.  Olanzapine should be used during pregnancy only if the potential benefit justifies the potential risk to the fetus.   In a study in lactating healthy women, olanzapine was excreted in breast milk.  It is recommended that women taking olanzapine should not breast feed.
Oral Minoxidil Counseling- I discussed with the patient the risks of oral minoxidil including but not limited to shortness of breath, swelling of the feet or ankles, dizziness, lightheadedness, unwanted hair growth and allergic reaction.  The patient verbalized understanding of the proper use and possible adverse effects of oral minoxidil.  All of the patient's questions and concerns were addressed.
Oral Minoxidil Pregnancy And Lactation Text: This medication should only be used when clearly needed if you are pregnant, attempting to become pregnant or breast feeding.
Otezla Counseling: The side effects of Otezla were discussed with the patient, including but not limited to worsening or new depression, weight loss, diarrhea, nausea, upper respiratory tract infection, and headache. Patient instructed to call the office should any adverse effect occur.  The patient verbalized understanding of the proper use and possible adverse effects of Otezla.  All the patient's questions and concerns were addressed.
Otezla Pregnancy And Lactation Text: This medication is Pregnancy Category C and it isn't known if it is safe during pregnancy. It is unknown if it is excreted in breast milk.
Oxybutynin Counseling:  I discussed with the patient the risks of oxybutynin including but not limited to skin rash, drowsiness, dry mouth, difficulty urinating, and blurred vision.
Propranolol Counseling:  I discussed with the patient the risks of propranolol including but not limited to low heart rate, low blood pressure, low blood sugar, restlessness and increased cold sensitivity. They should call the office if they experience any of these side effects.
Propranolol Pregnancy And Lactation Text: This medication is Pregnancy Category C and it isn't known if it is safe during pregnancy. It is excreted in breast milk.
SSKI Counseling:  I discussed with the patient the risks of SSKI including but not limited to thyroid abnormalities, metallic taste, GI upset, fever, headache, acne, arthralgias, paraesthesias, lymphadenopathy, easy bleeding, arrhythmias, and allergic reaction.
Sski Pregnancy And Lactation Text: This medication is Pregnancy Category D and isn't considered safe during pregnancy. It is excreted in breast milk.
Thalidomide Counseling: I discussed with the patient the risks of thalidomide including but not limited to birth defects, anxiety, weakness, chest pain, dizziness, cough and severe allergy.
Tranexamic Acid Counseling:  Patient advised of the small risk of bleeding problems with tranexamic acid. They were also instructed to call if they developed any nausea, vomiting or diarrhea. All of the patient's questions and concerns were addressed.
Tranexamic Acid Pregnancy And Lactation Text: It is unknown if this medication is safe during pregnancy or breast feeding.
Valtrex Counseling: I discussed with the patient the risks of valacyclovir including but not limited to kidney damage, nausea, vomiting and severe allergy.  The patient understands that if the infection seems to be worsening or is not improving, they are to call.
Valtrex Pregnancy And Lactation Text: this medication is Pregnancy Category B and is considered safe during pregnancy. This medication is not directly found in breast milk but it's metabolite acyclovir is present.

## 2023-10-12 ENCOUNTER — PATIENT MESSAGE (OUTPATIENT)
Dept: SLEEP MEDICINE | Facility: MEDICAL CENTER | Age: 68
End: 2023-10-12
Payer: MEDICARE

## 2023-10-12 DIAGNOSIS — G47.33 OSA (OBSTRUCTIVE SLEEP APNEA): ICD-10-CM

## 2023-10-18 NOTE — PATIENT COMMUNICATION
Patient needs an order for a new machine because it is older and needs to be replaced.  [place] last seen 06/29/23.

## 2023-10-21 NOTE — PROGRESS NOTES
Subjective:     CC:   Chief Complaint   Patient presents with    Lab Results         HPI:   Adelaide Pereira is a 68-year-old female who presents for follow-up visit to review recent lab results.  She feels well and has no acute medical complaints.  She continues to take the Ozempic 1 mg weekly and is doing well. She does report a recent slowing of her weight loss as she has been taking care of her ill mother and has not been able to devote as much time on diet and exercise. The patient is not yet interested in increasing her dose to 2 mg weekly until she has more time to devote to weight loss efforts.  Also reviewed the patient's most recent labs on 10/23/2023 which showed a normal TSH of 0.61, slightly elevated free T4 of 1.71, and a normal free T3 of 2.36.  She will continue her current regimen of levothyroxine 125 mcg daily.  The patient's most recent sodium level had normalized to 135.  We will continue to monitor this.        Past Medical History:   Diagnosis Date    Chickenpox     Irish measles     Hyperlipidemia     Hypothyroidism     Influenza     Mumps     Obesity     Sleep apnea 2011    on CPAP    thyroid nodules 2000    biopsy Hashimoto's thyroiditis / TPO ab neg       Social History     Tobacco Use    Smoking status: Never    Smokeless tobacco: Never   Vaping Use    Vaping Use: Never used   Substance Use Topics    Alcohol use: Yes     Comment: Occasionally     Drug use: No       Current Outpatient Medications Ordered in Epic   Medication Sig Dispense Refill    OZEMPIC, 1 MG/DOSE, 4 MG/3ML Solution Pen-injector INJECT 1 MG UNDER THE SKIN EVERY 7 DAYS 3 mL 3    rivaroxaban (XARELTO) 10 MG Tab tablet Take 1 Tablet by mouth every day at 6 PM. 21 Tablet 0    ondansetron (ZOFRAN ODT) 4 MG TABLET DISPERSIBLE Take 1 Tablet by mouth every 8 hours as needed for Nausea/Vomiting. 20 Tablet 0    levothyroxine (SYNTHROID) 125 MCG Tab Take 1 Tablet by mouth every morning on an empty stomach. 90 Tablet 3     "triamterene-hctz (MAXZIDE-25/DYAZIDE) 37.5-25 MG Tab TAKE 1/2 TABLET BY MOUTH DAILY 100 Tablet 2    EPINEPHrine (EPIPEN) 0.3 MG/0.3ML Solution Auto-injector solution for injection INJECT INTO THIGH UTD 1 Each 0    Multiple Vitamins-Minerals (PRESERVISION AREDS PO) Take  by mouth.      Coenzyme Q10 (CO Q 10 PO) Take  by mouth.      Fexofenadine HCl (ALLEGRA ALLERGY PO) Take  by mouth.      ELDERBERRY PO Take  by mouth.      vitamin D, Ergocalciferol, (DRISDOL) 1.25 MG (67738 UT) Cap capsule Take  by mouth every 7 days.      Flaxseed, Linseed, (FLAX SEED OIL PO) Take  by mouth.      Probiotic Product (PROBIOTIC-10 PO) Take  by mouth.       No current Epic-ordered facility-administered medications on file.       Allergies:  Nitrofurantoin, Aspirin, Codeine, Fish, Maxepa, Peanut oil, and Tree nuts food allergy    Health Maintenance: Completed    Review of Systems:  No fevers or chills. No cough, chest pain, or shortness of breath.       Objective:       Exam:  /80   Pulse 82   Temp 36.6 °C (97.8 °F) (Temporal)   Resp 20   Ht 1.626 m (5' 4\")   Wt 118 kg (259 lb 3.2 oz)   SpO2 95%   BMI 44.49 kg/m²  Body mass index is 44.49 kg/m².    Gen: Alert and oriented, No apparent distress.  Lungs: Normal effort, CTA bilaterally, no wheezes, rhonchi, or rales  CV: Regular rate and rhythm. No murmurs, rubs, or gallops.  Ext: No clubbing, cyanosis, edema.        Assessment & Plan:     68 y.o. female with the following -     Morbid obesity with body mass index (BMI) of 40.0 to 44.9 in adult (HCC)  Chronic condition, improving.  The patient continues to tolerate the Ozempic well.  She reports a recent slowing of her weight loss as she has been taking care of her ill mother and has not been able to devote as much time on diet and exercise.  Her current BMI at today's visit is 44.29.    -Continue current regimen of Ozempic 1 mg weekly, the patient is not yet interested in increasing her dose to 2 mg weekly until she has more " time to devote to weight loss efforts     Hypothyroidism due to Hashimoto's thyroiditis  Chronic condition, controlled.  The patient is currently taking levothyroxine 125 mcg daily.  She feels clinically euthyroid.  Her most recent labs on 10/23/2023 showed a normal TSH of 0.61, slightly elevated free T4 of 1.71, and a normal free T3 of 2.36.  -Continue current regimen of levothyroxine 125 mcg daily  - TSH; Future  - FREE THYROXINE; Future  - T3 FREE; Future     Primary hypertension  Hyponatremia  Chronic condition, controlled.  The patient currently takes triamterene-HCTZ 37.5-25 mg daily.  The patient has a chronic hyponatremia likely secondary to her blood pressure medication.  Her most recent sodium level on 10/23/2023 was normal at 135.  Blood pressure at today's visit is 124/80.  She denies new headaches, vision changes, chest pain, shortness of breath, lower extremity edema.  -Continue current regimen of triamterene-HCTZ 37.5-25 mg daily, will monitor Na on a regular basis  - Comp Metabolic Panel; Future     Screening for deficiency anemia  - CBC WITH DIFFERENTIAL; Future    Encounter for screening for diabetes mellitus  - Comp Metabolic Panel; Future  - HEMOGLOBIN A1C; Future     Screening for cardiovascular condition  - Lipid Profile; Future    Return in about 6 months (around 4/26/2024) for 6-month f/u visit.    Please note that this dictation was created using voice recognition software. I have made every reasonable attempt to correct obvious errors, but I expect that there are errors of grammar and possibly content that I did not discover before finalizing the note.

## 2023-10-23 ENCOUNTER — HOSPITAL ENCOUNTER (OUTPATIENT)
Dept: LAB | Facility: MEDICAL CENTER | Age: 68
End: 2023-10-23
Attending: INTERNAL MEDICINE
Payer: MEDICARE

## 2023-10-23 DIAGNOSIS — E03.8 HYPOTHYROIDISM DUE TO HASHIMOTO'S THYROIDITIS: ICD-10-CM

## 2023-10-23 DIAGNOSIS — Z13.0 SCREENING FOR DEFICIENCY ANEMIA: ICD-10-CM

## 2023-10-23 DIAGNOSIS — Z13.6 SCREENING FOR CARDIOVASCULAR CONDITION: ICD-10-CM

## 2023-10-23 DIAGNOSIS — E06.3 HYPOTHYROIDISM DUE TO HASHIMOTO'S THYROIDITIS: ICD-10-CM

## 2023-10-23 DIAGNOSIS — I10 PRIMARY HYPERTENSION: ICD-10-CM

## 2023-10-23 LAB
ALBUMIN SERPL BCP-MCNC: 4.3 G/DL (ref 3.2–4.9)
ALBUMIN/GLOB SERPL: 1.5 G/DL
ALP SERPL-CCNC: 91 U/L (ref 30–99)
ALT SERPL-CCNC: 15 U/L (ref 2–50)
ANION GAP SERPL CALC-SCNC: 10 MMOL/L (ref 7–16)
AST SERPL-CCNC: 18 U/L (ref 12–45)
BASOPHILS # BLD AUTO: 1 % (ref 0–1.8)
BASOPHILS # BLD: 0.05 K/UL (ref 0–0.12)
BILIRUB SERPL-MCNC: 0.6 MG/DL (ref 0.1–1.5)
BUN SERPL-MCNC: 10 MG/DL (ref 8–22)
CALCIUM ALBUM COR SERPL-MCNC: 9.1 MG/DL (ref 8.5–10.5)
CALCIUM SERPL-MCNC: 9.3 MG/DL (ref 8.5–10.5)
CHLORIDE SERPL-SCNC: 99 MMOL/L (ref 96–112)
CHOLEST SERPL-MCNC: 179 MG/DL (ref 100–199)
CO2 SERPL-SCNC: 26 MMOL/L (ref 20–33)
CREAT SERPL-MCNC: 0.47 MG/DL (ref 0.5–1.4)
EOSINOPHIL # BLD AUTO: 0.13 K/UL (ref 0–0.51)
EOSINOPHIL NFR BLD: 2.5 % (ref 0–6.9)
ERYTHROCYTE [DISTWIDTH] IN BLOOD BY AUTOMATED COUNT: 50.8 FL (ref 35.9–50)
FASTING STATUS PATIENT QL REPORTED: NORMAL
GFR SERPLBLD CREATININE-BSD FMLA CKD-EPI: 102 ML/MIN/1.73 M 2
GLOBULIN SER CALC-MCNC: 2.9 G/DL (ref 1.9–3.5)
GLUCOSE SERPL-MCNC: 81 MG/DL (ref 65–99)
HCT VFR BLD AUTO: 43.2 % (ref 37–47)
HDLC SERPL-MCNC: 67 MG/DL
HGB BLD-MCNC: 14.3 G/DL (ref 12–16)
IMM GRANULOCYTES # BLD AUTO: 0.01 K/UL (ref 0–0.11)
IMM GRANULOCYTES NFR BLD AUTO: 0.2 % (ref 0–0.9)
LDLC SERPL CALC-MCNC: 97 MG/DL
LYMPHOCYTES # BLD AUTO: 1.77 K/UL (ref 1–4.8)
LYMPHOCYTES NFR BLD: 34 % (ref 22–41)
MCH RBC QN AUTO: 30.2 PG (ref 27–33)
MCHC RBC AUTO-ENTMCNC: 33.1 G/DL (ref 32.2–35.5)
MCV RBC AUTO: 91.3 FL (ref 81.4–97.8)
MONOCYTES # BLD AUTO: 0.49 K/UL (ref 0–0.85)
MONOCYTES NFR BLD AUTO: 9.4 % (ref 0–13.4)
NEUTROPHILS # BLD AUTO: 2.76 K/UL (ref 1.82–7.42)
NEUTROPHILS NFR BLD: 52.9 % (ref 44–72)
NRBC # BLD AUTO: 0 K/UL
NRBC BLD-RTO: 0 /100 WBC (ref 0–0.2)
PLATELET # BLD AUTO: 280 K/UL (ref 164–446)
PMV BLD AUTO: 10.4 FL (ref 9–12.9)
POTASSIUM SERPL-SCNC: 4.7 MMOL/L (ref 3.6–5.5)
PROT SERPL-MCNC: 7.2 G/DL (ref 6–8.2)
RBC # BLD AUTO: 4.73 M/UL (ref 4.2–5.4)
SODIUM SERPL-SCNC: 135 MMOL/L (ref 135–145)
T3FREE SERPL-MCNC: 2.36 PG/ML (ref 2–4.4)
T4 FREE SERPL-MCNC: 1.71 NG/DL (ref 0.93–1.7)
TRIGL SERPL-MCNC: 76 MG/DL (ref 0–149)
TSH SERPL DL<=0.005 MIU/L-ACNC: 0.61 UIU/ML (ref 0.38–5.33)
WBC # BLD AUTO: 5.2 K/UL (ref 4.8–10.8)

## 2023-10-23 PROCEDURE — 36415 COLL VENOUS BLD VENIPUNCTURE: CPT

## 2023-10-23 PROCEDURE — 85025 COMPLETE CBC W/AUTO DIFF WBC: CPT

## 2023-10-23 PROCEDURE — 80061 LIPID PANEL: CPT

## 2023-10-23 PROCEDURE — 84439 ASSAY OF FREE THYROXINE: CPT

## 2023-10-23 PROCEDURE — 84443 ASSAY THYROID STIM HORMONE: CPT

## 2023-10-23 PROCEDURE — 84481 FREE ASSAY (FT-3): CPT

## 2023-10-23 PROCEDURE — 80053 COMPREHEN METABOLIC PANEL: CPT

## 2023-10-26 ENCOUNTER — OFFICE VISIT (OUTPATIENT)
Dept: MEDICAL GROUP | Facility: PHYSICIAN GROUP | Age: 68
End: 2023-10-26
Payer: MEDICARE

## 2023-10-26 VITALS
SYSTOLIC BLOOD PRESSURE: 124 MMHG | TEMPERATURE: 97.8 F | HEART RATE: 82 BPM | DIASTOLIC BLOOD PRESSURE: 80 MMHG | RESPIRATION RATE: 20 BRPM | WEIGHT: 259.2 LBS | BODY MASS INDEX: 44.25 KG/M2 | HEIGHT: 64 IN | OXYGEN SATURATION: 95 %

## 2023-10-26 DIAGNOSIS — Z13.6 SCREENING FOR CARDIOVASCULAR CONDITION: ICD-10-CM

## 2023-10-26 DIAGNOSIS — E06.3 HYPOTHYROIDISM DUE TO HASHIMOTO'S THYROIDITIS: ICD-10-CM

## 2023-10-26 DIAGNOSIS — E87.1 HYPONATREMIA: ICD-10-CM

## 2023-10-26 DIAGNOSIS — Z13.1 ENCOUNTER FOR SCREENING FOR DIABETES MELLITUS: ICD-10-CM

## 2023-10-26 DIAGNOSIS — Z13.0 SCREENING FOR DEFICIENCY ANEMIA: ICD-10-CM

## 2023-10-26 DIAGNOSIS — I10 PRIMARY HYPERTENSION: ICD-10-CM

## 2023-10-26 DIAGNOSIS — E03.8 HYPOTHYROIDISM DUE TO HASHIMOTO'S THYROIDITIS: ICD-10-CM

## 2023-10-26 DIAGNOSIS — E66.01 MORBID OBESITY WITH BODY MASS INDEX (BMI) OF 40.0 TO 44.9 IN ADULT (HCC): ICD-10-CM

## 2023-10-26 PROCEDURE — 99214 OFFICE O/P EST MOD 30 MIN: CPT | Performed by: INTERNAL MEDICINE

## 2023-10-26 PROCEDURE — 3074F SYST BP LT 130 MM HG: CPT | Performed by: INTERNAL MEDICINE

## 2023-10-26 PROCEDURE — 3079F DIAST BP 80-89 MM HG: CPT | Performed by: INTERNAL MEDICINE

## 2023-10-26 RX ORDER — DOXYCYCLINE HYCLATE 100 MG/1
CAPSULE ORAL
COMMUNITY
Start: 2023-09-14 | End: 2023-10-26

## 2023-10-26 ASSESSMENT — FIBROSIS 4 INDEX: FIB4 SCORE: 1.161895003862225065

## 2023-10-27 ENCOUNTER — TELEPHONE (OUTPATIENT)
Dept: HEALTH INFORMATION MANAGEMENT | Facility: OTHER | Age: 68
End: 2023-10-27
Payer: MEDICARE

## 2023-10-27 NOTE — TELEPHONE ENCOUNTER
Patient has called trying to follow up on CPAP orders as we have been told that new orders would be placed on 10/19/2023 but, not orders have been placed [place]. Patient is in need of new CPAP machine as her current one is old and orders have still not been received by [facility]. Please advise when orders can be sent so patient can get new CPAP as requested as she has been trying to get the orders for about three weeks.

## 2023-12-05 DIAGNOSIS — E03.8 HYPOTHYROIDISM DUE TO HASHIMOTO'S THYROIDITIS: ICD-10-CM

## 2023-12-05 DIAGNOSIS — E06.3 HYPOTHYROIDISM DUE TO HASHIMOTO'S THYROIDITIS: ICD-10-CM

## 2023-12-12 ENCOUNTER — APPOINTMENT (RX ONLY)
Dept: URBAN - METROPOLITAN AREA CLINIC 22 | Facility: CLINIC | Age: 68
Setting detail: DERMATOLOGY
End: 2023-12-12

## 2023-12-12 DIAGNOSIS — L82.1 OTHER SEBORRHEIC KERATOSIS: ICD-10-CM

## 2023-12-12 DIAGNOSIS — D22 MELANOCYTIC NEVI: ICD-10-CM

## 2023-12-12 DIAGNOSIS — Z85.828 PERSONAL HISTORY OF OTHER MALIGNANT NEOPLASM OF SKIN: ICD-10-CM

## 2023-12-12 DIAGNOSIS — L81.4 OTHER MELANIN HYPERPIGMENTATION: ICD-10-CM

## 2023-12-12 DIAGNOSIS — L30.4 ERYTHEMA INTERTRIGO: ICD-10-CM

## 2023-12-12 DIAGNOSIS — L57.0 ACTINIC KERATOSIS: ICD-10-CM

## 2023-12-12 PROBLEM — D22.5 MELANOCYTIC NEVI OF TRUNK: Status: ACTIVE | Noted: 2023-12-12

## 2023-12-12 PROCEDURE — ? LIQUID NITROGEN

## 2023-12-12 PROCEDURE — 17000 DESTRUCT PREMALG LESION: CPT

## 2023-12-12 PROCEDURE — 99213 OFFICE O/P EST LOW 20 MIN: CPT | Mod: 25

## 2023-12-12 PROCEDURE — ? SUNSCREEN RECOMMENDATIONS

## 2023-12-12 PROCEDURE — ? PRESCRIPTION

## 2023-12-12 PROCEDURE — ? COUNSELING

## 2023-12-12 RX ORDER — TRIAMCINOLONE ACETONIDE 1 MG/G
1 CREAM TOPICAL BID
Qty: 80 | Refills: 1 | Status: ERX | COMMUNITY
Start: 2023-12-12

## 2023-12-12 RX ADMIN — TRIAMCINOLONE ACETONIDE 1: 1 CREAM TOPICAL at 00:00

## 2023-12-12 ASSESSMENT — LOCATION DETAILED DESCRIPTION DERM
LOCATION DETAILED: RIGHT DISTAL DORSAL FOREARM
LOCATION DETAILED: INFERIOR THORACIC SPINE
LOCATION DETAILED: LEFT VENTRAL PROXIMAL FOREARM
LOCATION DETAILED: RIGHT VENTRAL PROXIMAL FOREARM
LOCATION DETAILED: MIDDLE STERNUM
LOCATION DETAILED: LEFT INFERIOR MEDIAL MALAR CHEEK
LOCATION DETAILED: PERIUMBILICAL SKIN
LOCATION DETAILED: INFERIOR MID FOREHEAD
LOCATION DETAILED: SUPERIOR THORACIC SPINE

## 2023-12-12 ASSESSMENT — LOCATION SIMPLE DESCRIPTION DERM
LOCATION SIMPLE: LEFT CHEEK
LOCATION SIMPLE: LEFT FOREARM
LOCATION SIMPLE: ABDOMEN
LOCATION SIMPLE: RIGHT FOREARM
LOCATION SIMPLE: INFERIOR FOREHEAD
LOCATION SIMPLE: UPPER BACK
LOCATION SIMPLE: CHEST

## 2023-12-12 ASSESSMENT — LOCATION ZONE DERM
LOCATION ZONE: FACE
LOCATION ZONE: TRUNK
LOCATION ZONE: ARM

## 2023-12-12 NOTE — PROCEDURE: SUNSCREEN RECOMMENDATIONS
Products Recommended: Yani Demarco \\nElta md UV clear
General Sunscreen Counseling: I recommended a broad spectrum sunscreen with a SPF of 30 or higher.  I explained that SPF 30 sunscreens block approximately 97 percent of the sun's harmful rays.  Sunscreens should be applied at least 15 minutes prior to expected sun exposure and then every 2 hours after that as long as sun exposure continues. If swimming or exercising sunscreen should be reapplied every 45 minutes to an hour after getting wet or sweating.  One ounce, or the equivalent of a shot glass full of sunscreen, is adequate to protect the skin not covered by a bathing suit. I also recommended a lip balm with a sunscreen as well. Sun protective clothing can be used in lieu of sunscreen but must be worn the entire time you are exposed to the sun's rays.
Detail Level: Zone

## 2024-01-08 ENCOUNTER — PATIENT MESSAGE (OUTPATIENT)
Dept: MEDICAL GROUP | Facility: PHYSICIAN GROUP | Age: 69
End: 2024-01-08
Payer: MEDICARE

## 2024-01-08 DIAGNOSIS — E66.01 MORBID OBESITY WITH BODY MASS INDEX (BMI) OF 40.0 TO 44.9 IN ADULT (HCC): ICD-10-CM

## 2024-01-09 ENCOUNTER — HOSPITAL ENCOUNTER (OUTPATIENT)
Dept: LAB | Facility: MEDICAL CENTER | Age: 69
End: 2024-01-09
Attending: INTERNAL MEDICINE
Payer: MEDICARE

## 2024-01-09 DIAGNOSIS — E03.8 HYPOTHYROIDISM DUE TO HASHIMOTO'S THYROIDITIS: ICD-10-CM

## 2024-01-09 DIAGNOSIS — E06.3 HYPOTHYROIDISM DUE TO HASHIMOTO'S THYROIDITIS: ICD-10-CM

## 2024-01-09 PROCEDURE — 36415 COLL VENOUS BLD VENIPUNCTURE: CPT

## 2024-01-09 PROCEDURE — 84439 ASSAY OF FREE THYROXINE: CPT

## 2024-01-09 PROCEDURE — 84481 FREE ASSAY (FT-3): CPT

## 2024-01-09 PROCEDURE — 84443 ASSAY THYROID STIM HORMONE: CPT

## 2024-01-10 LAB
T3FREE SERPL-MCNC: 2.09 PG/ML (ref 2–4.4)
T4 FREE SERPL-MCNC: 1.93 NG/DL (ref 0.93–1.7)
TSH SERPL DL<=0.005 MIU/L-ACNC: 0.7 UIU/ML (ref 0.38–5.33)

## 2024-01-10 RX ORDER — SEMAGLUTIDE 1.34 MG/ML
1 INJECTION, SOLUTION SUBCUTANEOUS
Qty: 9 ML | Refills: 3 | Status: SHIPPED | OUTPATIENT
Start: 2024-01-10 | End: 2024-03-18

## 2024-01-11 RX ORDER — TRIAMCINOLONE ACETONIDE 1 MG/G
CREAM TOPICAL
COMMUNITY
Start: 2023-12-12

## 2024-01-15 DIAGNOSIS — E06.3 HYPOTHYROIDISM DUE TO HASHIMOTO'S THYROIDITIS: ICD-10-CM

## 2024-01-15 DIAGNOSIS — E03.8 HYPOTHYROIDISM DUE TO HASHIMOTO'S THYROIDITIS: ICD-10-CM

## 2024-01-15 RX ORDER — LEVOTHYROXINE SODIUM 112 UG/1
112 TABLET ORAL
Qty: 100 TABLET | Refills: 3 | Status: SHIPPED | OUTPATIENT
Start: 2024-01-15

## 2024-03-14 ENCOUNTER — OFFICE VISIT (OUTPATIENT)
Dept: SLEEP MEDICINE | Facility: MEDICAL CENTER | Age: 69
End: 2024-03-14
Attending: PHYSICIAN ASSISTANT
Payer: MEDICARE

## 2024-03-14 VITALS
DIASTOLIC BLOOD PRESSURE: 86 MMHG | HEIGHT: 64 IN | OXYGEN SATURATION: 95 % | HEART RATE: 76 BPM | BODY MASS INDEX: 46.13 KG/M2 | SYSTOLIC BLOOD PRESSURE: 124 MMHG | WEIGHT: 270.19 LBS

## 2024-03-14 DIAGNOSIS — E66.01 MORBID OBESITY WITH BODY MASS INDEX (BMI) OF 40.0 TO 49.9 (HCC): ICD-10-CM

## 2024-03-14 DIAGNOSIS — G47.33 OSA (OBSTRUCTIVE SLEEP APNEA): ICD-10-CM

## 2024-03-14 PROCEDURE — 99213 OFFICE O/P EST LOW 20 MIN: CPT | Performed by: PHYSICIAN ASSISTANT

## 2024-03-14 PROCEDURE — 3074F SYST BP LT 130 MM HG: CPT | Performed by: PHYSICIAN ASSISTANT

## 2024-03-14 PROCEDURE — 99212 OFFICE O/P EST SF 10 MIN: CPT | Performed by: PHYSICIAN ASSISTANT

## 2024-03-14 PROCEDURE — 3079F DIAST BP 80-89 MM HG: CPT | Performed by: PHYSICIAN ASSISTANT

## 2024-03-14 ASSESSMENT — ENCOUNTER SYMPTOMS
SPUTUM PRODUCTION: 0
FEVER: 0
WEIGHT LOSS: 0
SHORTNESS OF BREATH: 0
INSOMNIA: 0
ORTHOPNEA: 0
SINUS PAIN: 0
TREMORS: 0
COUGH: 0
DIZZINESS: 0
CHILLS: 0
PALPITATIONS: 0
HEADACHES: 0
WHEEZING: 0
ROS GI COMMENTS: NO DENTURES, NO MISSING TEETH, NO SWALLOWING ISSUES
SORE THROAT: 0
HEARTBURN: 0

## 2024-03-14 ASSESSMENT — FIBROSIS 4 INDEX: FIB4 SCORE: 1.161895003862225065

## 2024-03-14 NOTE — PATIENT INSTRUCTIONS
1-reviewed compliance which is excellent  2-demonstrating use and benefit with therapy  3-Today we reviewed equipment cleaning  once weekly minimum  mask, tubing and water chamber  use dedicated container  use mild soap and water  SoClean or other ozone  are not recommended  white vinegar and water solution is no longer recommended  hang tubing to dry  mask sanitizing wipes are an option for use   4-As a reminder use distilled water only in humidifier chamber.    5-Equipment replacement schedule : Mask cushion every month, Head gear every 6 months, Tubing every 3 months, Ultra-fine filters 2 times per month, Humidifier chamber every 6 months   6-follow up in one year, sooner if needed

## 2024-03-14 NOTE — PROGRESS NOTES
"Chief Complaint   Patient presents with    Follow-Up     RAISA. Last seen 06/29/23       HPI:  Adelaide Pereira is a 68 y.o. year old female here today for follow-up on first compliance.    Past Medical History: Primary hypertension, hypothyroidism due to Hashimoto's thyroiditis, goiter, vitamin D deficiency, obesity.    Vitals:  /86 (BP Location: Left arm, Patient Position: Sitting, BP Cuff Size: Large adult)   Pulse 76   Ht 1.626 m (5' 4\")   Wt 123 kg (270 lb 3 oz)   SpO2 95% BMI of 46.38 kg/m².    Recent Imaging: None    Currently using  Resmed auto CPAP @12-14 cm H20 pressure; compliance reviewed for 2/12/2024 through 3/12/2024, days used 30/30, average daily usage 8 hours 52 minutes, 100% of days greater than or equal to 4 hours, mask leak at 8.8 LPM at 95th percentile, AHI 0.2 per hour.  See media for full report.    Device obtained January 2024  DME provider [facility]  Mask interface nasal mask    Polysomnogram obtained 10/17/2011 demonstrating severe RAISA with overall AHI 36.9/h increasing to 70/h during REM. Low O2 sat of 62%.      Sleep schedule goes to bed 9 PM, wakens 430-5 AM , and gets up during the night 1-2 times   Symptoms denies day time somnolence and denies morning headache    Stop Bang Score 6 (7/16/2023 12:50 PM)         Review of Systems   Constitutional:  Negative for chills, fever, malaise/fatigue and weight loss.   HENT:  Positive for hearing loss and tinnitus (occasional). Negative for congestion, nosebleeds, sinus pain and sore throat.    Respiratory:  Negative for cough, sputum production, shortness of breath and wheezing.    Cardiovascular:  Negative for chest pain, palpitations, orthopnea and leg swelling.   Gastrointestinal:  Negative for heartburn (managed with diet).        No dentures, no missing teeth, no swallowing issues   Neurological:  Negative for dizziness, tremors and headaches.   Psychiatric/Behavioral:  The patient does not have insomnia.        Past Medical " History:   Diagnosis Date    Chickenpox     [place]inid[place] measles     Hyperlipidemia     Hypothyroidism     Influenza     Mumps     Obesity     Sleep apnea 2011    on CPAP    thyroid nodules 2000    biopsy Hashimoto's thyroiditis / TPO ab neg       Past Surgical History:   Procedure Laterality Date    PRIMARY C SECTION         Family History   Problem Relation Age of Onset    Thyroid Mother     Cancer Mother         breast    Heart Attack Father     Sleep Apnea Father     Arterial Aneurysm Father     Sleep Apnea Brother     Cancer Maternal Aunt         breast    No Known Problems Sister     Diabetes Maternal Grandmother     No Known Problems Son     Thyroid Daughter        Social History     Socioeconomic History    Marital status:      Spouse name: Not on file    Number of children: Not on file    Years of education: Not on file    Highest education level: Master's degree (e.g., MA, MS, Manuel, MEd, MSW, KATHERINE)   Occupational History    Not on file   Tobacco Use    Smoking status: Never    Smokeless tobacco: Never   Vaping Use    Vaping Use: Never used   Substance and Sexual Activity    Alcohol use: Yes     Comment: Occasionally     Drug use: No    Sexual activity: Not Currently   Other Topics Concern    Not on file   Social History Narrative    Not on file     Social Determinants of Health     Financial Resource Strain: Low Risk  (3/19/2023)    Overall Financial Resource Strain (CARDIA)     Difficulty of Paying Living Expenses: Not very hard   Food Insecurity: No Food Insecurity (3/19/2023)    Hunger Vital Sign     Worried About Running Out of Food in the Last Year: Never true     Ran Out of Food in the Last Year: Never true   Transportation Needs: No Transportation Needs (3/19/2023)    PRAPARE - Transportation     Lack of Transportation (Medical): No     Lack of Transportation (Non-Medical): No   Physical Activity: Sufficiently Active (3/19/2023)    Exercise Vital Sign     Days of Exercise per Week: 4 days      Minutes of Exercise per Session: 120 min   Stress: Stress Concern Present (3/19/2023)    [employer] [place] [employer] - Occupational Stress Questionnaire     Feeling of Stress : To some extent   Social Connections: Moderately Integrated (3/19/2023)    Social Connection and Isolation Panel [NHANES]     Frequency of Communication with Friends and Family: More than three times a week     Frequency of Social Gatherings with Friends and Family: More than three times a week     Attends [employer] Services: More than 4 times per year     Active Member of Clubs or Organizations: Yes     Attends Club or Organization Meetings: More than 4 times per year     Marital Status:    Intimate Partner Violence: Not on file   Housing Stability: Low Risk  (3/19/2023)    Housing Stability Vital Sign     Unable to Pay for Housing in the Last Year: No     Number of Places Lived in the Last Year: 1     Unstable Housing in the Last Year: No       Allergies as of 03/14/2024 - Reviewed 03/14/2024   Allergen Reaction Noted    Nitrofurantoin Rash 06/22/2020    Aspirin  06/24/2017    Codeine  06/24/2017    Fish  06/24/2017    Maxepa  03/29/2010    Peanut oil  03/29/2010    Tree nuts food allergy Anaphylaxis 10/21/2021          Current medications as of today   Current Outpatient Medications   Medication Sig Dispense Refill    levothyroxine (SYNTHROID) 112 MCG Tab Take 1 Tablet by mouth every morning on an empty stomach. 100 Tablet 3    triamcinolone acetonide (KENALOG) 0.1 % Cream APPLY A THIN LAYER TOPICALLY TWICE DAILY TO AFFECTED AREAS ON STOMACH FOR 1-2 WEEKS WITH FLARES      triamterene-hctz (MAXZIDE-25/DYAZIDE) 37.5-25 MG Tab TAKE 1/2 TABLET BY MOUTH DAILY 100 Tablet 2    EPINEPHrine (EPIPEN) 0.3 MG/0.3ML Solution Auto-injector solution for injection INJECT INTO THIGH UTD 1 Each 0    Multiple Vitamins-Minerals (PRESERVISION AREDS PO) Take  by mouth.      Coenzyme Q10 (CO Q 10 PO) Take  by mouth.      Fexofenadine HCl  (ALLEGRA ALLERGY PO) Take  by mouth.      ELDERBERRY PO Take  by mouth.      vitamin D, Ergocalciferol, (DRISDOL) 1.25 MG (51828 UT) Cap capsule Take  by mouth every 7 days.      Flaxseed, Linseed, (FLAX SEED OIL PO) Take  by mouth.      Probiotic Product (PROBIOTIC-10 PO) Take  by mouth.      Semaglutide, 1 MG/DOSE, (OZEMPIC, 1 MG/DOSE,) 4 MG/3ML Solution Pen-injector Inject 1 mg under the skin every 7 days. 9 mL 3    rivaroxaban (XARELTO) 10 MG Tab tablet Take 1 Tablet by mouth every day at 6 PM. 21 Tablet 0    ondansetron (ZOFRAN ODT) 4 MG TABLET DISPERSIBLE Take 1 Tablet by mouth every 8 hours as needed for Nausea/Vomiting. 20 Tablet 0     No current facility-administered medications for this visit.         Physical Exam:   Gen:           Alert and oriented, No apparent distress. Mood and affect appropriate, normal interaction with examiner.   Hearing:     Grossly intact.  Nose:          Normal, no lesions or deformities.  Dentition:    Fair dentition.   Oropharynx:   Tongue normal, posterior pharynx without erythema or exudate.  Mallampati Classification: III  Neck:        Supple, trachea midline, no masses.  Respiratory Effort: No intercostal retractions or use of accessory muscles.   Gait and Station: Normal.  Digits and Nails: No clubbing, cyanosis, petechiae, or nodes.   Skin:        No rashes, lesions or ulcers noted.               Ext:           No cyanosis or edema.      Immunizations:  Flu: Recommended  Pneumovax 23: 7/8/2020  SARS CoV2 Vaccine: Recommended    Assessment / Plan:  1. RAISA (obstructive sleep apnea)    Reviewed compliance which is excellent, patient demonstrating continued use and benefit with therapy.  We did review equipment cleaning as well as recommended equipment replacement schedule.  Patient using distilled water only in humidifier chamber.  Will send updated order for mask and supplies.  Patient to follow-up in 1 year or sooner if needed.    2. Morbid obesity with body mass index  (BMI) of 40.0 to 49.9 (HCC)    Elevated BMI: Discussion regarding impact central adiposity on pulmonary function.  Encouraged to increase activity as tolerated and monitor nutritional intake.        Follow-up:   Return in about 1 year (around 3/14/2025) for Return with Sveta Macdonald PA-C.    Please note that this dictation was created using voice recognition software. I have made every reasonable attempt to correct obvious errors, but it is possible there are errors of grammar and possibly content that I did not discover before finalizing the note.

## 2024-03-18 ENCOUNTER — TELEPHONE (OUTPATIENT)
Dept: HEALTH INFORMATION MANAGEMENT | Facility: OTHER | Age: 69
End: 2024-03-18

## 2024-03-18 ENCOUNTER — HOSPITAL ENCOUNTER (OUTPATIENT)
Dept: LAB | Facility: MEDICAL CENTER | Age: 69
End: 2024-03-18
Attending: INTERNAL MEDICINE
Payer: MEDICARE

## 2024-03-18 DIAGNOSIS — E03.8 HYPOTHYROIDISM DUE TO HASHIMOTO'S THYROIDITIS: ICD-10-CM

## 2024-03-18 DIAGNOSIS — E06.3 HYPOTHYROIDISM DUE TO HASHIMOTO'S THYROIDITIS: ICD-10-CM

## 2024-03-18 LAB
T3FREE SERPL-MCNC: 1.92 PG/ML (ref 2–4.4)
T4 FREE SERPL-MCNC: 1.54 NG/DL (ref 0.93–1.7)
TSH SERPL DL<=0.005 MIU/L-ACNC: 1.57 UIU/ML (ref 0.38–5.33)

## 2024-03-18 PROCEDURE — 84481 FREE ASSAY (FT-3): CPT

## 2024-03-18 PROCEDURE — 84439 ASSAY OF FREE THYROXINE: CPT

## 2024-03-18 PROCEDURE — 36415 COLL VENOUS BLD VENIPUNCTURE: CPT

## 2024-03-18 PROCEDURE — 84443 ASSAY THYROID STIM HORMONE: CPT

## 2024-04-02 DIAGNOSIS — I10 PRIMARY HYPERTENSION: ICD-10-CM

## 2024-04-02 RX ORDER — TRIAMTERENE AND HYDROCHLOROTHIAZIDE 37.5; 25 MG/1; MG/1
TABLET ORAL
Qty: 100 TABLET | Refills: 0 | Status: SHIPPED | OUTPATIENT
Start: 2024-04-02

## 2024-04-02 NOTE — TELEPHONE ENCOUNTER
Received request via: Pharmacy    Was the patient seen in the last year in this department? Yes    Does the patient have an active prescription (recently filled or refills available) for medication(s) requested? No    Ph[facility] Name: [facility]    Does the patient have senior living Plus and need 100 day supply (blood pressure, diabetes and cholesterol meds only)? Yes, quantity updated to 100 days

## 2024-04-08 ENCOUNTER — PATIENT MESSAGE (OUTPATIENT)
Dept: MEDICAL GROUP | Facility: PHYSICIAN GROUP | Age: 69
End: 2024-04-08
Payer: MEDICARE

## 2024-04-10 RX ORDER — LEVOTHYROXINE SODIUM 112 UG/1
112 TABLET ORAL
Qty: 100 TABLET | Refills: 0 | Status: SHIPPED | OUTPATIENT
Start: 2024-04-10

## 2024-04-25 ENCOUNTER — HOSPITAL ENCOUNTER (OUTPATIENT)
Dept: LAB | Facility: MEDICAL CENTER | Age: 69
End: 2024-04-25
Attending: INTERNAL MEDICINE
Payer: MEDICARE

## 2024-04-25 DIAGNOSIS — Z13.1 ENCOUNTER FOR SCREENING FOR DIABETES MELLITUS: ICD-10-CM

## 2024-04-25 DIAGNOSIS — Z13.0 SCREENING FOR DEFICIENCY ANEMIA: ICD-10-CM

## 2024-04-25 DIAGNOSIS — Z13.6 SCREENING FOR CARDIOVASCULAR CONDITION: ICD-10-CM

## 2024-04-25 DIAGNOSIS — E06.3 HYPOTHYROIDISM DUE TO HASHIMOTO'S THYROIDITIS: ICD-10-CM

## 2024-04-25 DIAGNOSIS — E03.8 HYPOTHYROIDISM DUE TO HASHIMOTO'S THYROIDITIS: ICD-10-CM

## 2024-04-25 DIAGNOSIS — E87.1 HYPONATREMIA: ICD-10-CM

## 2024-04-25 LAB
ALBUMIN SERPL BCP-MCNC: 4.5 G/DL (ref 3.2–4.9)
ALBUMIN/GLOB SERPL: 1.6 G/DL
ALP SERPL-CCNC: 89 U/L (ref 30–99)
ALT SERPL-CCNC: 22 U/L (ref 2–50)
ANION GAP SERPL CALC-SCNC: 12 MMOL/L (ref 7–16)
AST SERPL-CCNC: 26 U/L (ref 12–45)
BASOPHILS # BLD AUTO: 0.5 % (ref 0–1.8)
BASOPHILS # BLD: 0.03 K/UL (ref 0–0.12)
BILIRUB SERPL-MCNC: 0.7 MG/DL (ref 0.1–1.5)
BUN SERPL-MCNC: 14 MG/DL (ref 8–22)
CALCIUM ALBUM COR SERPL-MCNC: 8.8 MG/DL (ref 8.5–10.5)
CALCIUM SERPL-MCNC: 9.2 MG/DL (ref 8.5–10.5)
CHLORIDE SERPL-SCNC: 97 MMOL/L (ref 96–112)
CHOLEST SERPL-MCNC: 183 MG/DL (ref 100–199)
CO2 SERPL-SCNC: 24 MMOL/L (ref 20–33)
CREAT SERPL-MCNC: 0.54 MG/DL (ref 0.5–1.4)
EOSINOPHIL # BLD AUTO: 0.14 K/UL (ref 0–0.51)
EOSINOPHIL NFR BLD: 2.4 % (ref 0–6.9)
ERYTHROCYTE [DISTWIDTH] IN BLOOD BY AUTOMATED COUNT: 48.7 FL (ref 35.9–50)
EST. AVERAGE GLUCOSE BLD GHB EST-MCNC: 111 MG/DL
FASTING STATUS PATIENT QL REPORTED: NORMAL
GFR SERPLBLD CREATININE-BSD FMLA CKD-EPI: 99 ML/MIN/1.73 M 2
GLOBULIN SER CALC-MCNC: 2.8 G/DL (ref 1.9–3.5)
GLUCOSE SERPL-MCNC: 86 MG/DL (ref 65–99)
HBA1C MFR BLD: 5.5 % (ref 4–5.6)
HCT VFR BLD AUTO: 45.5 % (ref 37–47)
HDLC SERPL-MCNC: 67 MG/DL
HGB BLD-MCNC: 14.9 G/DL (ref 12–16)
IMM GRANULOCYTES # BLD AUTO: 0.02 K/UL (ref 0–0.11)
IMM GRANULOCYTES NFR BLD AUTO: 0.3 % (ref 0–0.9)
LDLC SERPL CALC-MCNC: 100 MG/DL
LYMPHOCYTES # BLD AUTO: 1.96 K/UL (ref 1–4.8)
LYMPHOCYTES NFR BLD: 34.2 % (ref 22–41)
MCH RBC QN AUTO: 30 PG (ref 27–33)
MCHC RBC AUTO-ENTMCNC: 32.7 G/DL (ref 32.2–35.5)
MCV RBC AUTO: 91.7 FL (ref 81.4–97.8)
MONOCYTES # BLD AUTO: 0.58 K/UL (ref 0–0.85)
MONOCYTES NFR BLD AUTO: 10.1 % (ref 0–13.4)
NEUTROPHILS # BLD AUTO: 3 K/UL (ref 1.82–7.42)
NEUTROPHILS NFR BLD: 52.5 % (ref 44–72)
NRBC # BLD AUTO: 0 K/UL
NRBC BLD-RTO: 0 /100 WBC (ref 0–0.2)
PLATELET # BLD AUTO: 264 K/UL (ref 164–446)
PMV BLD AUTO: 10.5 FL (ref 9–12.9)
POTASSIUM SERPL-SCNC: 4.4 MMOL/L (ref 3.6–5.5)
PROT SERPL-MCNC: 7.3 G/DL (ref 6–8.2)
RBC # BLD AUTO: 4.96 M/UL (ref 4.2–5.4)
SODIUM SERPL-SCNC: 133 MMOL/L (ref 135–145)
T3FREE SERPL-MCNC: 2.19 PG/ML (ref 2–4.4)
T4 FREE SERPL-MCNC: 1.77 NG/DL (ref 0.93–1.7)
TRIGL SERPL-MCNC: 82 MG/DL (ref 0–149)
TSH SERPL DL<=0.005 MIU/L-ACNC: 1.66 UIU/ML (ref 0.38–5.33)
WBC # BLD AUTO: 5.7 K/UL (ref 4.8–10.8)

## 2024-04-25 PROCEDURE — 85025 COMPLETE CBC W/AUTO DIFF WBC: CPT

## 2024-04-25 PROCEDURE — 84443 ASSAY THYROID STIM HORMONE: CPT

## 2024-04-25 PROCEDURE — 80053 COMPREHEN METABOLIC PANEL: CPT

## 2024-04-25 PROCEDURE — 84481 FREE ASSAY (FT-3): CPT

## 2024-04-25 PROCEDURE — 83036 HEMOGLOBIN GLYCOSYLATED A1C: CPT

## 2024-04-25 PROCEDURE — 84439 ASSAY OF FREE THYROXINE: CPT

## 2024-04-25 PROCEDURE — 36415 COLL VENOUS BLD VENIPUNCTURE: CPT

## 2024-04-25 PROCEDURE — 80061 LIPID PANEL: CPT

## 2024-05-12 NOTE — PROGRESS NOTES
Verbal consent was acquired by the patient to use [employer] ambient listening note generation during this visit Yes       Subjective:     CC:   Chief Complaint   Patient presents with    Medication Refill     Epipen     Lab Results     Thyroid     Referral Needed     PT          HPI:       History of Present Illness  Adelaide Pereira is a 68-year-old female who presents for follow-up visit.    The patient expresses a great deal of frustration over her insurance no longer covering her Ozempic.  She would like to continue medication assisted weight loss as it has worked effectively for her in the past.  She reports significant weight gain after stopping the Ozempic and being unable to take it while she was caring for her mother who recently passed away.  She expresses a preference for Mounjaro over compounded medications as she has heard they are not as effective.  She continues to report left knee pain and he is IR for knee replacement surgery when she meets the weight requirement.  She is requesting a referral to PT for her knee.    The patient's EpiPen prescription  last month and she is requesting a refill. She she would also like to switch to Cologuard testing as she has no history of polyps.          Past Medical History:   Diagnosis Date    Chickenpox     [place] measles     Hyperlipidemia     Hypothyroidism     Influenza     Mumps     Obesity     Sleep apnea 2011    on CPAP    thyroid nodules 2000    biopsy Hashimoto's thyroiditis / TPO ab neg       Social History     Tobacco Use    Smoking status: Never    Smokeless tobacco: Never   Vaping Use    Vaping status: Never Used   Substance Use Topics    Alcohol use: Yes     Comment: Occasionally     Drug use: No       Current Outpatient Medications Ordered in Epic   Medication Sig Dispense Refill    Tirzepatide-Weight Management 2.5 MG/0.5ML Solution Auto-injector Inject 2.5 mg under the skin every 7 days. 2 mL 1    levothyroxine (SYNTHROID) 112 MCG  "Tab Take 1 Tablet by mouth every morning on an empty stomach. 100 Tablet 0    triamterene-hctz (MAXZIDE-25/DYAZIDE) 37.5-25 MG Tab TAKE 1/2 TABLET BY MOUTH DAILY 100 Tablet 0    triamcinolone acetonide (KENALOG) 0.1 % Cream APPLY A THIN LAYER TOPICALLY TWICE DAILY TO AFFECTED AREAS ON STOMACH FOR 1-2 WEEKS WITH FLARES      EPINEPHrine (EPIPEN) 0.3 MG/0.3ML Solution Auto-injector solution for injection INJECT INTO THIGH UTD 1 Each 0    Multiple Vitamins-Minerals (PRESERVISION AREDS PO) Take  by mouth.      Coenzyme Q10 (CO Q 10 PO) Take  by mouth.      Fexofenadine HCl (ALLEGRA ALLERGY PO) Take  by mouth.      ELDERBERRY PO Take  by mouth.      vitamin D, Ergocalciferol, (DRISDOL) 1.25 MG (66157 UT) Cap capsule Take  by mouth every 7 days.      Flaxseed, Linseed, (FLAX SEED OIL PO) Take  by mouth.      Probiotic Product (PROBIOTIC-10 PO) Take  by mouth.       No current Epic-ordered facility-administered medications on file.       Allergies:  Nitrofurantoin, Aspirin, Codeine, Fish, Maxepa, Peanut oil, and Tree nuts food allergy    Health Maintenance: Completed    Review of Systems:  No fevers or chills. No cough, chest pain, or shortness of breath.       Objective:       Exam:  /78   Pulse 68   Temp 36.5 °C (97.7 °F) (Temporal)   Ht 1.626 m (5' 4\")   Wt (!) 126 kg (277 lb 11.2 oz)   SpO2 97%   BMI 47.67 kg/m²  Body mass index is 47.67 kg/m².    Gen: Alert and oriented, No apparent distress.  Lungs: Normal effort, CTA bilaterally, no wheezes, rhonchi, or rales  CV: Regular rate and rhythm. No murmurs, rubs, or gallops.  Ext: No clubbing, cyanosis, edema.        Assessment & Plan:     68 y.o. female with the following -     Morbid obesity with BMI of 45.0-49.9, adult (HCC)  Chronic condition, progressive.  The patient was previously on Ozempic but stopped taking it due to its prohibitive cost and need to care for her dying mother.  Her insurance no longer covers this medication.  Her current BMI at today's " visit is 47.67.  A comprehensive discussion was held regarding the various GLP-1 inhibitors, including Ozempic and Mounjaro, which are also covered for diabetes. The patient was informed about the potential side effects of Mounjaro, including nausea, vomiting, heartburn, and headaches. A prescription for Mounjaro, with a dosage of 2.5 mg weekly, was issued. The patient was also advised to contact her insurance provider to identify a covered weight loss medication. The patient was advised to consider compounded versions of medications.   -Start Mounjaro 2.5 mg weekly, risks benefits and side effects discussed with the patient who expressed verbal understanding  - Tirzepatide-Weight Management 2.5 MG/0.5ML Solution Auto-injector; Inject 2.5 mg under the skin every 7 days.  Dispense: 2 mL; Refill: 1    Hypothyroidism due to Hashimoto's thyroiditis  Hypothyroidism due to Hashimoto's thyroiditis  Chronic condition, controlled.  The patient is currently taking levothyroxine 112 mcg daily.  She feels clinically euthyroid.  Labs on 4/25/2024 showed a normal TSH of 1.66, slightly elevated free T41.77, and a normal free T3 of 2.19.  -Continue current regimen of levothyroxine 112 mcg daily  - TSH; Future  - FREE THYROXINE; Future    Primary hypertension  Hyponatremia  Chronic condition, controlled.  The patient currently takes triamterene-HCTZ 37.5-25 mg daily.  The patient has a chronic hyponatremia likely secondary to her blood pressure medication.  Most recent sodium level on 4/25/2024 was stable at 133.  Blood pressure at today's visit is 122/78.  She denies new headaches, vision changes, chest pain, shortness of breath, lower extremity edema.  -Continue current regimen of triamterene-HCTZ 37.5-25 mg daily, will monitor Na on a regular basis  - Comp Metabolic Panel; Future    Mixed hyperlipidemia  Acute condition, new diagnosis.  The patient is not on medication for this condition.  Lipid panel on 4/25/2024 showed a total  cholesterol of 183, , HDL 67, triglycerides 82.  The 10-year ASCVD risk score (Radha HOLLOWAY, et al., 2019) is: 10.8%  -Continue dietary management with a low-cholesterol diet   - Comp Metabolic Panel; Future  - Lipid Profile; Future    Food allergy  - EPINEPHrine (EPIPEN 2-ZION) 0.3 MG/0.3ML Solution Auto-injector solution for injection; Inject 0.3 mL into the shoulder, thigh, or buttocks one time for 1 dose.  Dispense: 1 Each; Refill: 0    Chronic pain of left knee  - Referral to Physical Therapy    Screening for deficiency anemia  - CBC WITH DIFFERENTIAL; Future    Colon cancer screening  - COLOGUARD (FIT DNA)    Assessment & Plan  1. Weight management.      2. Health maintenance.  A Cologuard test was ordered for the patient. A referral for physical therapy was made. The patient was advised to practice meditation. A prescription for an EpiPen was provided.          Return in about 6 months (around 11/16/2024) for 6-month f/u visit.    Please note that this dictation was created using voice recognition software. I have made every reasonable attempt to correct obvious errors, but I expect that there are errors of grammar and possibly content that I did not discover before finalizing the note.

## 2024-05-16 ENCOUNTER — OFFICE VISIT (OUTPATIENT)
Dept: MEDICAL GROUP | Facility: PHYSICIAN GROUP | Age: 69
End: 2024-05-16
Payer: MEDICARE

## 2024-05-16 VITALS
DIASTOLIC BLOOD PRESSURE: 78 MMHG | HEIGHT: 64 IN | OXYGEN SATURATION: 97 % | TEMPERATURE: 97.7 F | SYSTOLIC BLOOD PRESSURE: 122 MMHG | WEIGHT: 277.7 LBS | HEART RATE: 68 BPM | BODY MASS INDEX: 47.41 KG/M2

## 2024-05-16 DIAGNOSIS — Z91.018 FOOD ALLERGY: ICD-10-CM

## 2024-05-16 DIAGNOSIS — M25.562 CHRONIC PAIN OF LEFT KNEE: ICD-10-CM

## 2024-05-16 DIAGNOSIS — E78.2 MIXED HYPERLIPIDEMIA: ICD-10-CM

## 2024-05-16 DIAGNOSIS — E87.1 HYPONATREMIA: ICD-10-CM

## 2024-05-16 DIAGNOSIS — I10 PRIMARY HYPERTENSION: ICD-10-CM

## 2024-05-16 DIAGNOSIS — Z12.11 COLON CANCER SCREENING: ICD-10-CM

## 2024-05-16 DIAGNOSIS — Z13.0 SCREENING FOR DEFICIENCY ANEMIA: ICD-10-CM

## 2024-05-16 DIAGNOSIS — E03.8 HYPOTHYROIDISM DUE TO HASHIMOTO'S THYROIDITIS: ICD-10-CM

## 2024-05-16 DIAGNOSIS — G89.29 CHRONIC PAIN OF LEFT KNEE: ICD-10-CM

## 2024-05-16 DIAGNOSIS — E06.3 HYPOTHYROIDISM DUE TO HASHIMOTO'S THYROIDITIS: ICD-10-CM

## 2024-05-16 DIAGNOSIS — E66.01 MORBID OBESITY WITH BMI OF 45.0-49.9, ADULT (HCC): ICD-10-CM

## 2024-05-16 PROCEDURE — 3078F DIAST BP <80 MM HG: CPT | Performed by: INTERNAL MEDICINE

## 2024-05-16 PROCEDURE — 3074F SYST BP LT 130 MM HG: CPT | Performed by: INTERNAL MEDICINE

## 2024-05-16 PROCEDURE — 99214 OFFICE O/P EST MOD 30 MIN: CPT | Performed by: INTERNAL MEDICINE

## 2024-05-16 RX ORDER — EPINEPHRINE 0.3 MG/.3ML
0.3 INJECTION SUBCUTANEOUS ONCE
Qty: 1 EACH | Refills: 0 | Status: SHIPPED | OUTPATIENT
Start: 2024-05-16 | End: 2024-05-16

## 2024-05-16 ASSESSMENT — FIBROSIS 4 INDEX: FIB4 SCORE: 1.47

## 2024-05-30 NOTE — ASSESSMENT & PLAN NOTE
From: Kalie López  To: Dr. Jalen Mabry  Sent: 5/29/2024 7:17 AM EDT  Subject: Dexcom sensor     Good morning can you please send a refill for my dexcom sensor?   Thank you    Patient is down another 3 pounds since last visit. Doing well with carb modifications.

## 2024-06-10 ENCOUNTER — APPOINTMENT (RX ONLY)
Dept: URBAN - METROPOLITAN AREA CLINIC 22 | Facility: CLINIC | Age: 69
Setting detail: DERMATOLOGY
End: 2024-06-10

## 2024-06-10 DIAGNOSIS — L81.4 OTHER MELANIN HYPERPIGMENTATION: ICD-10-CM

## 2024-06-10 DIAGNOSIS — D18.0 HEMANGIOMA: ICD-10-CM

## 2024-06-10 DIAGNOSIS — L82.1 OTHER SEBORRHEIC KERATOSIS: ICD-10-CM

## 2024-06-10 DIAGNOSIS — L82.0 INFLAMED SEBORRHEIC KERATOSIS: ICD-10-CM

## 2024-06-10 DIAGNOSIS — D22 MELANOCYTIC NEVI: ICD-10-CM

## 2024-06-10 DIAGNOSIS — Z85.828 PERSONAL HISTORY OF OTHER MALIGNANT NEOPLASM OF SKIN: ICD-10-CM

## 2024-06-10 PROBLEM — D18.01 HEMANGIOMA OF SKIN AND SUBCUTANEOUS TISSUE: Status: ACTIVE | Noted: 2024-06-10

## 2024-06-10 PROBLEM — D22.5 MELANOCYTIC NEVI OF TRUNK: Status: ACTIVE | Noted: 2024-06-10

## 2024-06-10 PROCEDURE — ? SUNSCREEN RECOMMENDATIONS

## 2024-06-10 PROCEDURE — ? COUNSELING

## 2024-06-10 PROCEDURE — 99213 OFFICE O/P EST LOW 20 MIN: CPT

## 2024-06-10 ASSESSMENT — LOCATION SIMPLE DESCRIPTION DERM
LOCATION SIMPLE: CHEST
LOCATION SIMPLE: RIGHT FOREARM
LOCATION SIMPLE: ABDOMEN
LOCATION SIMPLE: RIGHT THIGH
LOCATION SIMPLE: LEFT FOREARM
LOCATION SIMPLE: INFERIOR FOREHEAD
LOCATION SIMPLE: UPPER BACK

## 2024-06-10 ASSESSMENT — LOCATION DETAILED DESCRIPTION DERM
LOCATION DETAILED: INFERIOR MID FOREHEAD
LOCATION DETAILED: LEFT RIB CAGE
LOCATION DETAILED: SUPERIOR THORACIC SPINE
LOCATION DETAILED: LEFT VENTRAL PROXIMAL FOREARM
LOCATION DETAILED: RIGHT DISTAL DORSAL FOREARM
LOCATION DETAILED: MIDDLE STERNUM
LOCATION DETAILED: RIGHT ANTERIOR PROXIMAL THIGH
LOCATION DETAILED: LEFT LATERAL ABDOMEN
LOCATION DETAILED: INFERIOR THORACIC SPINE
LOCATION DETAILED: RIGHT VENTRAL PROXIMAL FOREARM

## 2024-06-10 ASSESSMENT — LOCATION ZONE DERM
LOCATION ZONE: FACE
LOCATION ZONE: TRUNK
LOCATION ZONE: ARM
LOCATION ZONE: LEG

## 2024-07-15 ENCOUNTER — TELEPHONE (OUTPATIENT)
Dept: MEDICAL GROUP | Facility: PHYSICIAN GROUP | Age: 69
End: 2024-07-15
Payer: MEDICARE

## 2024-07-15 DIAGNOSIS — E03.8 HYPOTHYROIDISM DUE TO HASHIMOTO'S THYROIDITIS: ICD-10-CM

## 2024-07-15 DIAGNOSIS — E06.3 HYPOTHYROIDISM DUE TO HASHIMOTO'S THYROIDITIS: ICD-10-CM

## 2024-07-22 ENCOUNTER — HOSPITAL ENCOUNTER (OUTPATIENT)
Dept: RADIOLOGY | Facility: MEDICAL CENTER | Age: 69
End: 2024-07-22
Attending: INTERNAL MEDICINE
Payer: MEDICARE

## 2024-07-22 DIAGNOSIS — Z12.31 VISIT FOR SCREENING MAMMOGRAM: ICD-10-CM

## 2024-07-22 PROCEDURE — 77063 BREAST TOMOSYNTHESIS BI: CPT

## 2024-07-24 RX ORDER — LEVOTHYROXINE SODIUM 112 UG/1
112 TABLET ORAL
Qty: 100 TABLET | Refills: 3 | Status: SHIPPED | OUTPATIENT
Start: 2024-07-24

## 2024-08-26 ENCOUNTER — PATIENT MESSAGE (OUTPATIENT)
Dept: HEALTH INFORMATION MANAGEMENT | Facility: OTHER | Age: 69
End: 2024-08-26

## 2024-09-04 ENCOUNTER — DOCUMENTATION (OUTPATIENT)
Dept: HEALTH INFORMATION MANAGEMENT | Facility: OTHER | Age: 69
End: 2024-09-04
Payer: MEDICARE

## 2024-09-30 ENCOUNTER — OFFICE VISIT (OUTPATIENT)
Dept: ENDOCRINOLOGY | Facility: MEDICAL CENTER | Age: 69
End: 2024-09-30
Attending: INTERNAL MEDICINE
Payer: MEDICARE

## 2024-09-30 VITALS
DIASTOLIC BLOOD PRESSURE: 90 MMHG | HEART RATE: 86 BPM | BODY MASS INDEX: 49.51 KG/M2 | OXYGEN SATURATION: 94 % | SYSTOLIC BLOOD PRESSURE: 130 MMHG | HEIGHT: 64 IN | WEIGHT: 290 LBS

## 2024-09-30 DIAGNOSIS — E06.3 HASHIMOTO'S DISEASE: ICD-10-CM

## 2024-09-30 DIAGNOSIS — E03.9 PRIMARY HYPOTHYROIDISM: ICD-10-CM

## 2024-09-30 DIAGNOSIS — E55.9 VITAMIN D DEFICIENCY: ICD-10-CM

## 2024-09-30 DIAGNOSIS — E04.2 MULTIPLE THYROID NODULES: ICD-10-CM

## 2024-09-30 DIAGNOSIS — E66.01 MORBID OBESITY WITH BODY MASS INDEX (BMI) OF 40.0 TO 44.9 IN ADULT (HCC): ICD-10-CM

## 2024-09-30 PROCEDURE — 99211 OFF/OP EST MAY X REQ PHY/QHP: CPT | Performed by: INTERNAL MEDICINE

## 2024-09-30 RX ORDER — DEXAMETHASONE 1 MG
1 TABLET ORAL
Qty: 2 TABLET | Refills: 0 | Status: SHIPPED | OUTPATIENT
Start: 2024-09-30

## 2024-09-30 ASSESSMENT — FIBROSIS 4 INDEX: FIB4 SCORE: 1.49

## 2024-09-30 NOTE — PROGRESS NOTES
Chief Complaint: Consult requested by Anny Barksdale M.D. for evaluation of Hypothyroidism    HPI:     Adelaide Pereira is a 69 y.o. female with history of Hypothyroidism and Hashimoto's disease diagnosed in 2000 and is here for initial evaluation.   She used to see Dr. Mooney.  She is trying to lose weight to get approved for knee pain due to severe OA  She has obesity with a BMI of 49.79 kg/m2 with a baseline weight of 290 lbs.   She does have RAISA HTN along with the OA as weight related complications    She took Ozempic that was previously prescribed by Dr. Barksdale  in March 2023 and she lost 40 lbs but she stopped it on Jan 2024 due to lack of coverage     She is now compounded Zepbound since 8/2024 but is paying > 450$    She reports the following symptoms:fatigue, cold intolerance and she has chronic constipation which has been present for 3 months.   She denies anxiousness, feeling excessive energy, tremulousness, palpitations, and sweating.  She denies lumps or enlargement in the neck.    She reports a family history of Hypothyroidism with her mother.   She is currently on Levothyroxine 112 mcg  daily which has been her thyroid hormone dose since 6 months.   She denies any recent dose changes. She reports Good compliance and takes her thyroid hormone daily before breakfast.  She denies taking any iron, calcium supplements or antacids.     Aside from primary hypothyroidism she has multiple thyroid nodules that are small low risk and benign her last ultrasound in 2023  She denies a family history of thyroid cancer denies anterior neck compressive symptoms    Her ultrasound on August 10, 2023 showed subcentimeter nodules on the right lower lobe and and right upper lobe and a 1 cm solid isoechoic low risk for low suspicion solid nodule on the left mid lobe      Patient's medications, allergies, and social histories were reviewed and updated as appropriate.      ROS:     CONS:     No fever, no chills, no  weight loss, no fatigue   EYES:      No diplopia, no blurry vision, no redness of eyes, no swelling of eyelids   ENT:    No hearing loss, No ear pain, No sore throat, no dysphagia, no neck swelling   CV:     No chest pain, no palpitations, no claudication, no orthopnea, no PND   PULM:    No SOB, no cough, no hemoptysis, no wheezing    GI:   No nausea, no vomiting, no diarrhea, no constipation, no bloody stools   :  Passing urine well, no dysuria, no hematuria   ENDO:   No polyuria, no polydipsia, no heat intolerance, no cold intolerance   NEURO: No headaches, no dizziness, no convulsions, no tremors   MUSC:  No joint swellings, no arthralgias, no myalgias, no weakness   SKIN:   No rash, no ulcers, no dry skin   PSYCH:   No depression, no anxiety, no difficulty sleeping       Past Medical History:  Patient Active Problem List    Diagnosis Date Noted    Mixed hyperlipidemia 05/16/2024    Osteoarthritis of left knee 03/09/2023    Osteoarthritis of right shoulder 12/21/2021    Food allergy 07/08/2020    Multiple thyroid nodules 07/08/2020    Hyponatremia 07/07/2020    Binge eating 06/22/2020    Non-smoker 06/10/2019    Morbid obesity with body mass index (BMI) of 40.0 to 44.9 in adult ([facility]) 06/28/2018    Chronic thyroiditis 02/12/2013    Vitamin D deficiency 02/16/2012    RAISA (obstructive sleep apnea)     Primary hypertension 02/22/2010    Hypothyroidism due to Hashimoto's thyroiditis 02/22/2010    Goiter        Past Surgical History:  Past Surgical History:   Procedure Laterality Date    PRIMARY C SECTION          Allergies:  Nitrofurantoin, Aspirin, Codeine, Fish, Maxepa, Peanut oil, and Tree nuts food allergy     Current Medications:    Current Outpatient Medications:     levothyroxine (SYNTHROID) 112 MCG Tab, TAKE 1 TABLET BY MOUTH EVERY MORNING ON AN EMPTY STOMACH, Disp: 100 Tablet, Rfl: 3    Tirzepatide-Weight Management 2.5 MG/0.5ML Solution Auto-injector, Inject 2.5 mg under the skin every 7 days. (Patient  taking differently: Inject 2.5 mg under the skin every 7 days. Pt stated they are taking 0.5), Disp: 2 mL, Rfl: 1    triamterene-hctz (MAXZIDE-25/DYAZIDE) 37.5-25 MG Tab, TAKE 1/2 TABLET BY MOUTH DAILY, Disp: 100 Tablet, Rfl: 0    triamcinolone acetonide (KENALOG) 0.1 % Cream, APPLY A THIN LAYER TOPICALLY TWICE DAILY TO AFFECTED AREAS ON STOMACH FOR 1-2 WEEKS WITH FLARES, Disp: , Rfl:     EPINEPHrine (EPIPEN) 0.3 MG/0.3ML Solution Auto-injector solution for injection, INJECT INTO THIGH UTD, Disp: 1 Each, Rfl: 0    Multiple Vitamins-Minerals (PRESERVISION AREDS PO), Take  by mouth., Disp: , Rfl:     Coenzyme Q10 (CO Q 10 PO), Take  by mouth., Disp: , Rfl:     Fexofenadine HCl (ALLEGRA ALLERGY PO), Take  by mouth., Disp: , Rfl:     ELDERBERRY PO, Take  by mouth., Disp: , Rfl:     Flaxseed, Linseed, (FLAX SEED OIL PO), Take  by mouth., Disp: , Rfl:     Probiotic Product (PROBIOTIC-10 PO), Take  by mouth., Disp: , Rfl:     vitamin D, Ergocalciferol, (DRISDOL) 1.25 MG (16136 UT) Cap capsule, Take  by mouth every 7 days., Disp: , Rfl:     Social History:  Social History     Socioeconomic History    Marital status:      Spouse name: Not on file    Number of children: Not on file    Years of education: Not on file    Highest education level: Master's degree (e.g., MA, MS, Manuel, MEd, MSW, KATHERINE)   Occupational History    Not on file   Tobacco Use    Smoking status: Never    Smokeless tobacco: Never   Vaping Use    Vaping status: Never Used   Substance and Sexual Activity    Alcohol use: Yes     Comment: Occasionally     Drug use: No    Sexual activity: Not Currently   Other Topics Concern    Not on file   Social History Narrative    Not on file     Social Determinants of Health     Financial Resource Strain: Low Risk  (3/19/2023)    Overall Financial Resource Strain (CARDIA)     Difficulty of Paying Living Expenses: Not very hard   Food Insecurity: No Food Insecurity (3/19/2023)    Hunger Vital Sign     Worried About  "Running Out of Food in the Last Year: Never true     Ran Out of Food in the Last Year: Never true   Transportation Needs: No Transportation Needs (3/19/2023)    PRAPARE - Transportation     Lack of Transportation (Medical): No     Lack of Transportation (Non-Medical): No   Physical Activity: Sufficiently Active (3/19/2023)    Exercise Vital Sign     Days of Exercise per Week: 4 days     Minutes of Exercise per Session: 120 min   Stress: Stress Concern Present (3/19/2023)    [place] citizen [place] [employer] Occup[employer] - Occupational Stress Questionnaire     Feeling of Stress : To some extent   Social Connections: Moderately Integrated (3/19/2023)    Social Connection and Isolation Panel [NHANES]     Frequency of Communication with Friends and Family: More than three times a week     Frequency of Social Gatherings with Friends and Family: More than three times a week     Attends Rastafarian Services: More than 4 times per year     Active Member of Clubs or Organizations: Yes     Attends Club or Organization Meetings: More than 4 times per year     Marital Status:    Intimate Partner Violence: Not on file   Housing Stability: Low Risk  (3/19/2023)    Housing Stability Vital Sign     Unable to Pay for Housing in the Last Year: No     Number of Places Lived in the Last Year: 1     Unstable Housing in the Last Year: No        Family History:   Family History   Problem Relation Age of Onset    Thyroid Mother     Cancer Mother         breast    Heart Attack Father     Sleep Apnea Father     Arterial Aneurysm Father     Sleep Apnea Brother     Cancer Maternal Aunt         breast    No Known Problems Sister     Diabetes Maternal Grandmother     No Known Problems Son     Thyroid Daughter          PHYSICAL EXAM:   Vital signs: BP (!) 130/90 (BP Location: Left arm, Patient Position: Sitting, BP Cuff Size: Large adult)   Pulse 86   Ht 1.626 m (5' 4\")   Wt (!) 132 kg (290 lb)   SpO2 94%   BMI 49.78 kg/m²   GENERAL: " Well-developed, well-nourished  in no apparent distress.   EYE: No ocular and eyelid asymmetry, Anicteric sclerae,  PERRL  HENT: Hearing grossly intact, Normocephalic, atraumatic. Pink, moist mucous membranes, No exudate  NECK: Supple. Trachea midline. thyroid is normal in size without nodules or tenderness  CARDIOVASCULAR: Regular rate and rhythm. No murmurs, rubs, or gallops.   LUNGS: Clear to auscultation bilaterally   ABDOMEN: Soft, nontender with positive bowel sounds.   EXTREMITIES: No clubbing, cyanosis, or edema.   NEUROLOGICAL: Cranial nerves II-XII are grossly intact   Symmetric reflexes at the patella no proximal muscle weakness  LYMPH: No cervical, supraclavicular,  adenopathy palpated.   SKIN: No rashes, lesions. Turgor is normal.    Labs:  Lab Results   Component Value Date/Time    WBC 5.7 04/25/2024 08:41 AM    WBC 6.7 08/15/2012 11:05 AM    RBC 4.96 04/25/2024 08:41 AM    RBC 4.99 08/15/2012 11:05 AM    HEMOGLOBIN 14.9 04/25/2024 08:41 AM    MCV 91.7 04/25/2024 08:41 AM    MCV 90 08/15/2012 11:05 AM    MCH 30.0 04/25/2024 08:41 AM    MCH 30.1 08/15/2012 11:05 AM    MCHC 32.7 04/25/2024 08:41 AM    RDW 48.7 04/25/2024 08:41 AM    RDW 14.9 08/15/2012 11:05 AM    MPV 10.5 04/25/2024 08:41 AM       Lab Results   Component Value Date/Time    SODIUM 133 (L) 04/25/2024 08:41 AM    POTASSIUM 4.4 04/25/2024 08:41 AM    CHLORIDE 97 04/25/2024 08:41 AM    CO2 24 04/25/2024 08:41 AM    ANION 12.0 04/25/2024 08:41 AM    GLUCOSE 86 04/25/2024 08:41 AM    BUN 14 04/25/2024 08:41 AM    CREATININE 0.54 04/25/2024 08:41 AM    CREATININE 0.59 02/08/2013 08:05 AM    CALCIUM 9.2 04/25/2024 08:41 AM    ASTSGOT 26 04/25/2024 08:41 AM    ALTSGPT 22 04/25/2024 08:41 AM    TBILIRUBIN 0.7 04/25/2024 08:41 AM    ALBUMIN 4.5 04/25/2024 08:41 AM    TOTPROTEIN 7.3 04/25/2024 08:41 AM    GLOBULIN 2.8 04/25/2024 08:41 AM    AGRATIO 1.6 04/25/2024 08:41 AM       Lab Results   Component Value Date/Time    CHOLSTRLTOT 183 04/25/2024  "0841    TRIGLYCERIDE 82 2024 0841    HDL 67 2024 0841     (H) 2024 0841    CHOLHDLRAT 3.6 2010 0930       Lab Results   Component Value Date/Time    TSHULTRASEN 1.660 2024 0841     Lab Results   Component Value Date/Time    FREET4 1.77 (H) 2024 0841     Lab Results   Component Value Date/Time    FREET3 2.19 2024 0841     No results found for: \"THYSTIMIG\"    Lab Results   Component Value Date/Time    MICROSOMALA <9.0 2020 0805         Imagin/10/2023 9:17 AM     HISTORY/REASON FOR EXAM:  Chronic thyroiditis, nodules.     TECHNIQUE/EXAM DESCRIPTION:  Ultrasound of the soft tissues of the head and neck.     COMPARISON:  2021 ultrasound of the soft tissues of the head and neck.     FINDINGS:  The thyroid gland is heterogeneous.  Vascularity is normal.     The right lobe of the thyroid gland measures 2.01 cm x 4.84 cm x 2.12 cm.  The left lobe of the thyroid gland measures 1.32 cm x 3.37 cm x 1.40 cm.  The isthmus measures 0.44 cm.     Nodules >= 1cm:     Nodule #1  Location:  Right  upper  Size:  0.6 x 0.6 x 0.3 cm  Composition:  Solid-2  Echogenicity:  Hypoechoic-2  Shape:  Wider than tall-0  Margins:  Smooth-0  Echogenic Foci:  None-0     ACR TIRADS points/category:  4 - TR4 - Moderately Suspicious     Nodule #2  Location:  Right  lower  Size:  0.7 x 0.6 x 0.6 cm  Composition:  Solid-2  Echogenicity:  Hypoechoic-2  Shape:  Wider than tall-0  Margins:  Smooth-0  Echogenic Foci:  None-0     ACR TIRADS points/category:  4 - TR4 - Moderately Suspicious     Nodule #3  Location:  Left  mid  Size:  1.0 x 0.9 x 0.9 cm  Composition:  Solid-2  Echogenicity:  Isoechoic-1  Shape:  Wider than tall-0  Margins:  Smooth-0  Echogenic Foci:  None-0     ACR TIRADS points/category:  3 - TR3 - Mildly Suspicious     IMPRESSION:     Subcentimeter TR4 nodules in the right thyroid gland and a 1 cm TR3 nodule in the left thyroid gland.     ACR TI-RADS Recommendations  No FNA or " follow up recommended.     Recommendations based on the American College of Radiology Thyroid imaging, reporting and Data System (TI-RADS) 2017.       ASSESSMENT/PLAN:     1. Primary hypothyroidism  Controlled  I reviewed x-ray her labs I explained to her why her free T4 is high.  The most important is her TSH is normal  Continue levothyroxine 112 mcg daily  Reviewed proper administration of thyroid hormone  Patient should take thyroid hormone 30-60 minutes before breakfast on an empty stomach plain water and not take it together with food, iron, calcium, and antacids.  Iron, calcium, and antacids should be taken at least 4 hours apart from thyroid hormone.  Repeat thyroid labs in 6 months      2. Hashimoto's disease  Stable this is the etiology of her primary hypothyroidism    3. Morbid obesity with body mass index (BMI) of 40.0 to 44.9 in adult (HCC)  Unstable she has morbid obesity with obesity with complications such as sleep apnea and hypertension  She also has severe osteoarthritis and needs knee surgery but cannot be cleared until she loses weight  I am going to have her go see our nurse practitioner for continuation of Zepbound therapy  I recommended she combine this with diet and exercise  Lastly I want her to complete an overnight dexamethasone suppression test to rule out hypercortisolism as a potential cause of weight gain    4. Multiple thyroid nodules  Stable she has low suspicion subcentimeter nodules on the right upper lobe and right lower lobe and an isoechoic solid nodule in the left mid lobe measuring 1 cm.  Recommend observation and repeating ultrasound again in 1 year    5. Vitamin D deficiency  Stable will check calcium vitamin D with future labs      Return in about 6 months (around 3/30/2025).       Total time spent on day of service was over 60 minutes which included obtaining a detailed history and physical exam, ordering labs, coordinating care and scheduling future follow-up    Thank you  kindly for allowing me to participate in the thyroid care plan for this patient.    Jarrell Macias MD, [facility], [facility]      CC:   Anny Barksdale M.D.

## 2024-10-05 ENCOUNTER — HOSPITAL ENCOUNTER (OUTPATIENT)
Dept: LAB | Facility: MEDICAL CENTER | Age: 69
End: 2024-10-05
Attending: INTERNAL MEDICINE
Payer: MEDICARE

## 2024-10-05 DIAGNOSIS — E66.01 MORBID OBESITY WITH BODY MASS INDEX (BMI) OF 40.0 TO 44.9 IN ADULT (HCC): ICD-10-CM

## 2024-10-05 LAB — CORTIS SERPL-MCNC: 1.3 UG/DL (ref 0–23)

## 2024-10-05 PROCEDURE — 80299 QUANTITATIVE ASSAY DRUG: CPT

## 2024-10-05 PROCEDURE — 82533 TOTAL CORTISOL: CPT

## 2024-10-05 PROCEDURE — 36415 COLL VENOUS BLD VENIPUNCTURE: CPT

## 2024-10-10 LAB — TEST NAME 95000: NORMAL

## 2024-10-17 DIAGNOSIS — I10 PRIMARY HYPERTENSION: ICD-10-CM

## 2024-10-17 RX ORDER — TRIAMTERENE AND HYDROCHLOROTHIAZIDE 37.5; 25 MG/1; MG/1
TABLET ORAL
Qty: 100 TABLET | Refills: 0 | Status: SHIPPED | OUTPATIENT
Start: 2024-10-17

## 2024-11-05 ENCOUNTER — OFFICE VISIT (OUTPATIENT)
Dept: URGENT CARE | Facility: PHYSICIAN GROUP | Age: 69
End: 2024-11-05
Payer: MEDICARE

## 2024-11-05 ENCOUNTER — HOSPITAL ENCOUNTER (OUTPATIENT)
Facility: MEDICAL CENTER | Age: 69
End: 2024-11-05
Attending: PHYSICIAN ASSISTANT
Payer: MEDICARE

## 2024-11-05 VITALS
WEIGHT: 283 LBS | HEIGHT: 63 IN | RESPIRATION RATE: 16 BRPM | SYSTOLIC BLOOD PRESSURE: 132 MMHG | DIASTOLIC BLOOD PRESSURE: 74 MMHG | OXYGEN SATURATION: 94 % | BODY MASS INDEX: 50.14 KG/M2 | TEMPERATURE: 97.5 F | HEART RATE: 81 BPM

## 2024-11-05 DIAGNOSIS — N30.01 ACUTE CYSTITIS WITH HEMATURIA: ICD-10-CM

## 2024-11-05 LAB
APPEARANCE UR: NORMAL
BILIRUB UR STRIP-MCNC: NEGATIVE MG/DL
COLOR UR AUTO: NORMAL
GLUCOSE UR STRIP.AUTO-MCNC: NEGATIVE MG/DL
KETONES UR STRIP.AUTO-MCNC: NEGATIVE MG/DL
LEUKOCYTE ESTERASE UR QL STRIP.AUTO: NEGATIVE
NITRITE UR QL STRIP.AUTO: NEGATIVE
PH UR STRIP.AUTO: 7 [PH] (ref 5–8)
PROT UR QL STRIP: 30 MG/DL
RBC UR QL AUTO: NORMAL
SP GR UR STRIP.AUTO: 1.02
UROBILINOGEN UR STRIP-MCNC: 0.2 MG/DL

## 2024-11-05 PROCEDURE — 81002 URINALYSIS NONAUTO W/O SCOPE: CPT | Performed by: PHYSICIAN ASSISTANT

## 2024-11-05 PROCEDURE — 87086 URINE CULTURE/COLONY COUNT: CPT

## 2024-11-05 PROCEDURE — 3075F SYST BP GE 130 - 139MM HG: CPT | Performed by: PHYSICIAN ASSISTANT

## 2024-11-05 PROCEDURE — 99213 OFFICE O/P EST LOW 20 MIN: CPT | Performed by: PHYSICIAN ASSISTANT

## 2024-11-05 PROCEDURE — 3078F DIAST BP <80 MM HG: CPT | Performed by: PHYSICIAN ASSISTANT

## 2024-11-05 RX ORDER — CEFDINIR 300 MG/1
300 CAPSULE ORAL 2 TIMES DAILY
Qty: 10 CAPSULE | Refills: 0 | Status: SHIPPED | OUTPATIENT
Start: 2024-11-05 | End: 2024-11-10

## 2024-11-05 RX ORDER — PHENAZOPYRIDINE HYDROCHLORIDE 200 MG/1
200 TABLET, FILM COATED ORAL 3 TIMES DAILY PRN
Qty: 6 TABLET | Refills: 0 | Status: SHIPPED | OUTPATIENT
Start: 2024-11-05

## 2024-11-05 ASSESSMENT — ENCOUNTER SYMPTOMS
WEAKNESS: 0
FEVER: 0
DIAPHORESIS: 0
FLANK PAIN: 0
ABDOMINAL PAIN: 0
HEADACHES: 0
NAUSEA: 0
VOMITING: 0
CHILLS: 0
MYALGIAS: 0
DIZZINESS: 0

## 2024-11-05 ASSESSMENT — FIBROSIS 4 INDEX: FIB4 SCORE: 1.49

## 2024-11-05 NOTE — PROGRESS NOTES
Subjective:     CHIEF COMPLAINT  Chief Complaint   Patient presents with    UTI     X 2 days with pain with urination, blood in urine, urgency and frequency.       HPI  Adelaide Pereira is a very pleasant 69 y.o. female who presents to the clinic with UTI-like symptoms over the last couple days.  Patient has been experiencing urinary frequency, urgency and dysuria.  Had slight incontinence yesterday.  Noted some blood in her urine this morning.  Experiencing some pressure over the suprapubic region.  No abdominal pain, flank pain, nausea, vomiting, fevers or chills.    REVIEW OF SYSTEMS  Review of Systems   Constitutional:  Negative for chills, diaphoresis, fever and malaise/fatigue.   Gastrointestinal:  Negative for abdominal pain, nausea and vomiting.   Genitourinary:  Positive for dysuria, frequency, hematuria and urgency. Negative for flank pain.   Musculoskeletal:  Negative for myalgias.   Skin:  Negative for rash.   Neurological:  Negative for dizziness, weakness and headaches.       PAST MEDICAL HISTORY  Patient Active Problem List    Diagnosis Date Noted    Primary hypothyroidism 09/30/2024    Hashimoto's disease 09/30/2024    Mixed hyperlipidemia 05/16/2024    Osteoarthritis of left knee 03/09/2023    Osteoarthritis of right shoulder 12/21/2021    Food allergy 07/08/2020    Multiple thyroid nodules 07/08/2020    Hyponatremia 07/07/2020    Binge eating 06/22/2020    Non-smoker 06/10/2019    Morbid obesity with body mass index (BMI) of 40.0 to 44.9 in adult (HCC) 06/28/2018    Chronic thyroiditis 02/12/2013    Vitamin D deficiency 02/16/2012    RAISA (obstructive sleep apnea)     Primary hypertension 02/22/2010    Hypothyroidism due to Hashimoto's thyroiditis 02/22/2010    Goiter        SURGICAL HISTORY   has a past surgical history that includes primary c section.    ALLERGIES  Allergies   Allergen Reactions    Nitrofurantoin Rash    Aspirin     Codeine     Fish     Maxepa     Peanut Oil     Tree Nuts  "Food Allergy Anaphylaxis       CURRENT MEDICATIONS  Home Medications       Reviewed by Baldemar Syed P.A.-C. (Physician Assistant) on 11/05/24 at 0959  Med List Status: <None>     Medication Last Dose Status   Coenzyme Q10 (CO Q 10 PO) Taking Active   dexamethasone (DECADRON) 1 MG Tab Not Taking Active   ELDERBERRY PO Taking Active   EPINEPHrine (EPIPEN) 0.3 MG/0.3ML Solution Auto-injector solution for injection PRN Active   Fexofenadine HCl (ALLEGRA ALLERGY PO) Taking Active   Flaxseed, Linseed, (FLAX SEED OIL PO) Taking Active   levothyroxine (SYNTHROID) 112 MCG Tab Taking Active   Multiple Vitamins-Minerals (PRESERVISION AREDS PO) Taking Active   Probiotic Product (PROBIOTIC-10 PO) Taking Active   Tirzepatide-Weight Management 2.5 MG/0.5ML Solution Auto-injector Taking Active   triamcinolone acetonide (KENALOG) 0.1 % Cream PRN Active   triamterene-hctz (MAXZIDE-25/DYAZIDE) 37.5-25 MG Tab Taking Active   vitamin D, Ergocalciferol, (DRISDOL) 1.25 MG (01645 UT) Cap capsule Taking Active                    SOCIAL HISTORY  Social History     Tobacco Use    Smoking status: Never    Smokeless tobacco: Never   Vaping Use    Vaping status: Never Used   Substance and Sexual Activity    Alcohol use: Yes     Comment: Occasionally     Drug use: No    Sexual activity: Not Currently       FAMILY HISTORY  Family History   Problem Relation Age of Onset    Thyroid Mother     Cancer Mother         breast    Heart Attack Father     Sleep Apnea Father     Arterial Aneurysm Father     Sleep Apnea Brother     Cancer Maternal Aunt         breast    No Known Problems Sister     Diabetes Maternal Grandmother     No Known Problems Son     Thyroid Daughter           Objective:     VITAL SIGNS: /74   Pulse 81   Temp 36.4 °C (97.5 °F) (Temporal)   Resp 16   Ht 1.6 m (5' 3\")   Wt (!) 128 kg (283 lb)   SpO2 94%   BMI 50.13 kg/m²     PHYSICAL EXAM  Physical Exam  Constitutional:       General: She is not in acute distress.     " Appearance: Normal appearance. She is not ill-appearing, toxic-appearing or diaphoretic.   HENT:      Head: Normocephalic and atraumatic.      Mouth/Throat:      Mouth: Mucous membranes are moist.   Eyes:      Conjunctiva/sclera: Conjunctivae normal.   Cardiovascular:      Rate and Rhythm: Normal rate and regular rhythm.      Pulses: Normal pulses.      Heart sounds: Normal heart sounds.   Pulmonary:      Effort: Pulmonary effort is normal.   Abdominal:      Palpations: Abdomen is soft.      Tenderness: There is no abdominal tenderness. There is no right CVA tenderness, left CVA tenderness, guarding or rebound.   Musculoskeletal:         General: Normal range of motion.      Cervical back: Normal range of motion. No muscular tenderness.   Skin:     General: Skin is warm and dry.   Neurological:      General: No focal deficit present.      Mental Status: She is alert and oriented to person, place, and time. Mental status is at baseline.   Psychiatric:         Mood and Affect: Mood normal.         Thought Content: Thought content normal.       Lab Results/POC Test Results   Results for orders placed or performed in visit on 11/05/24   POCT Urinalysis    Collection Time: 11/05/24 10:00 AM   Result Value Ref Range    POC Color Red Negative    POC Appearance Turbid Negative    POC Glucose Negative Negative mg/dL    POC Bilirubin Negative Negative mg/dL    POC Ketones Negative Negative mg/dL    POC Specific Gravity 1.020 <1.005 - >1.030    POC Blood Large Negative    POC Urine PH 7.0 5.0 - 8.0    POC Protein 30 Negative mg/dL    POC Urobiligen 0.2 Negative (0.2) mg/dL    POC Nitrites Negative Negative    POC Leukocyte Esterase Negative Negative     *Note: Due to a large number of results and/or encounters for the requested time period, some results have not been displayed. A complete set of results can be found in Results Review.           Assessment/Plan:     1. Acute cystitis with hematuria  - POCT Urinalysis  -  cefdinir (OMNICEF) 300 MG Cap; Take 1 Capsule by mouth 2 times a day for 5 days.  Dispense: 10 Capsule; Refill: 0  - URINE CULTURE(NEW); Future  - phenazopyridine (PYRIDIUM) 200 MG Tab; Take 1 Tablet by mouth 3 times a day as needed for Severe Pain or Moderate Pain.  Dispense: 6 Tablet; Refill: 0      MDM/Comments:    - Pt educated on preventative measures for avoiding future UTIs  - Advised to increase fluid intake  - OTC Pyridium (Azo) for symptomatic relief, advised that it will turn urine orange in color  - Pending urine culture  - ER precautions given regarding pyelonephritis including fevers greater than 101 and, vomiting and dehydration, increased back pain.    Differential diagnosis, natural history, supportive care, and indications for immediate follow-up discussed. All questions answered. Patient agrees with the plan of care.    Follow-up as needed if symptoms worsen or fail to improve to PCP, Urgent care or Emergency Room.    I have personally reviewed prior external notes and test results pertinent to today's visit.  I have independently reviewed and interpreted all diagnostics ordered during this urgent care acute visit.   Discussed management options (risks,benefits, and alternatives to treatment). Pt expresses understanding and the treatment plan was agreed upon. Questions were encouraged and answered to pt's satisfaction.    Please note that this dictation was created using voice recognition software. I have made a reasonable attempt to correct obvious errors, but I expect that there are errors of grammar and possibly content that I did not discover before finalizing the note.

## 2024-11-07 LAB
BACTERIA UR CULT: NORMAL
SIGNIFICANT IND 70042: NORMAL
SITE SITE: NORMAL
SOURCE SOURCE: NORMAL

## 2024-12-10 ENCOUNTER — APPOINTMENT (OUTPATIENT)
Dept: URBAN - METROPOLITAN AREA CLINIC 22 | Facility: CLINIC | Age: 69
Setting detail: DERMATOLOGY
End: 2024-12-10

## 2024-12-10 DIAGNOSIS — L82.1 OTHER SEBORRHEIC KERATOSIS: ICD-10-CM

## 2024-12-10 DIAGNOSIS — L81.4 OTHER MELANIN HYPERPIGMENTATION: ICD-10-CM

## 2024-12-10 DIAGNOSIS — L82.0 INFLAMED SEBORRHEIC KERATOSIS: ICD-10-CM

## 2024-12-10 DIAGNOSIS — D18.0 HEMANGIOMA: ICD-10-CM

## 2024-12-10 DIAGNOSIS — D22 MELANOCYTIC NEVI: ICD-10-CM

## 2024-12-10 DIAGNOSIS — Z85.828 PERSONAL HISTORY OF OTHER MALIGNANT NEOPLASM OF SKIN: ICD-10-CM

## 2024-12-10 PROBLEM — D22.5 MELANOCYTIC NEVI OF TRUNK: Status: ACTIVE | Noted: 2024-12-10

## 2024-12-10 PROBLEM — D18.01 HEMANGIOMA OF SKIN AND SUBCUTANEOUS TISSUE: Status: ACTIVE | Noted: 2024-12-10

## 2024-12-10 PROCEDURE — 99213 OFFICE O/P EST LOW 20 MIN: CPT | Mod: 25

## 2024-12-10 PROCEDURE — ? LIQUID NITROGEN

## 2024-12-10 PROCEDURE — ? COUNSELING

## 2024-12-10 PROCEDURE — 17110 DESTRUCTION B9 LES UP TO 14: CPT

## 2024-12-10 PROCEDURE — ? SUNSCREEN RECOMMENDATIONS

## 2024-12-10 ASSESSMENT — LOCATION DETAILED DESCRIPTION DERM
LOCATION DETAILED: STERNAL NOTCH
LOCATION DETAILED: INFERIOR MID FOREHEAD
LOCATION DETAILED: INFERIOR THORACIC SPINE
LOCATION DETAILED: EPIGASTRIC SKIN
LOCATION DETAILED: RIGHT MID-UPPER BACK
LOCATION DETAILED: RIGHT VENTRAL PROXIMAL FOREARM
LOCATION DETAILED: MIDDLE STERNUM
LOCATION DETAILED: RIGHT POSTERIOR SHOULDER
LOCATION DETAILED: LEFT POSTERIOR SHOULDER
LOCATION DETAILED: RIGHT ANTERIOR DISTAL THIGH
LOCATION DETAILED: SUPERIOR THORACIC SPINE
LOCATION DETAILED: RIGHT DISTAL DORSAL FOREARM
LOCATION DETAILED: LEFT VENTRAL PROXIMAL FOREARM
LOCATION DETAILED: LEFT ANTERIOR DISTAL THIGH
LOCATION DETAILED: RIGHT ANTERIOR PROXIMAL THIGH

## 2024-12-10 ASSESSMENT — LOCATION SIMPLE DESCRIPTION DERM
LOCATION SIMPLE: RIGHT FOREARM
LOCATION SIMPLE: ABDOMEN
LOCATION SIMPLE: LEFT FOREARM
LOCATION SIMPLE: LEFT THIGH
LOCATION SIMPLE: LEFT SHOULDER
LOCATION SIMPLE: RIGHT UPPER BACK
LOCATION SIMPLE: INFERIOR FOREHEAD
LOCATION SIMPLE: UPPER BACK
LOCATION SIMPLE: RIGHT THIGH
LOCATION SIMPLE: CHEST
LOCATION SIMPLE: RIGHT SHOULDER

## 2024-12-10 ASSESSMENT — LOCATION ZONE DERM
LOCATION ZONE: LEG
LOCATION ZONE: FACE
LOCATION ZONE: TRUNK
LOCATION ZONE: ARM

## 2024-12-10 NOTE — PROCEDURE: COUNSELING
From: Milan Seth  Sent: 10/4/2018 7:01 AM EDT  Subject: Medication Renewal Request    Milan Seth would like a refill of the following medications:     amphetamine-dextroamphetamine (ADDERALL XR) 20 MG extended release capsule [BRYANT AVENDANO, APRN - CNP]   Patient Comment: Please fill asap. I leave to go out of town on Monday and will not have enough to get me thru the week I am gone.  So please fill so I can have them to  on Saturday at the latest. Thank you    Preferred pharmacy: [address] [place] [address], [address] [place] [address] [phone] - F [phone]        Medication renewals requested in this message routed separately:     sertraline (ZOLOFT) 50 MG tablet [STUART SCHROEDER, APRN - CNP]
Detail Level: Detailed
Detail Level: Zone

## 2024-12-10 NOTE — PROCEDURE: LIQUID NITROGEN
Spray Paint Technique: No
Show Aperture Variable?: Yes
Detail Level: Detailed
Consent: The patient's consent was obtained including but not limited to risks of crusting, scabbing, blistering, scarring, darker or lighter pigmentary change, recurrence, incomplete removal and infection.
Spray Paint Text: The liquid nitrogen was applied to the skin utilizing a spray paint frosting technique.
Post-Care Instructions: I reviewed with the patient in detail post-care instructions. Patient is to wear sunprotection, and avoid picking at any of the treated lesions. Pt may apply Vaseline to crusted or scabbing areas.
Pared With?: Dermablade
Medical Necessity Information: It is in your best interest to select a reason for this procedure from the list below. All of these items fulfill various CMS LCD requirements except the new and changing color options.
Number Of Freeze-Thaw Cycles: 2 freeze-thaw cycles
Medical Necessity Clause: This procedure was medically necessary because the lesions that were treated were:

## 2025-01-06 ENCOUNTER — APPOINTMENT (OUTPATIENT)
Dept: URBAN - METROPOLITAN AREA CLINIC 6 | Facility: CLINIC | Age: 70
Setting detail: DERMATOLOGY
End: 2025-01-06

## 2025-01-06 DIAGNOSIS — Z41.9 ENCOUNTER FOR PROCEDURE FOR PURPOSES OTHER THAN REMEDYING HEALTH STATE, UNSPECIFIED: ICD-10-CM

## 2025-01-06 PROCEDURE — ? MEDICAL CONSULTATION: FILLERS

## 2025-02-04 SDOH — ECONOMIC STABILITY: FOOD INSECURITY: WITHIN THE PAST 12 MONTHS, THE FOOD YOU BOUGHT JUST DIDN'T LAST AND YOU DIDN'T HAVE MONEY TO GET MORE.: NEVER TRUE

## 2025-02-04 SDOH — HEALTH STABILITY: PHYSICAL HEALTH: ON AVERAGE, HOW MANY MINUTES DO YOU ENGAGE IN EXERCISE AT THIS LEVEL?: 60 MIN

## 2025-02-04 SDOH — ECONOMIC STABILITY: INCOME INSECURITY: HOW HARD IS IT FOR YOU TO PAY FOR THE VERY BASICS LIKE FOOD, HOUSING, MEDICAL CARE, AND HEATING?: NOT VERY HARD

## 2025-02-04 SDOH — ECONOMIC STABILITY: INCOME INSECURITY: IN THE LAST 12 MONTHS, WAS THERE A TIME WHEN YOU WERE NOT ABLE TO PAY THE MORTGAGE OR RENT ON TIME?: NO

## 2025-02-04 SDOH — HEALTH STABILITY: PHYSICAL HEALTH: ON AVERAGE, HOW MANY DAYS PER WEEK DO YOU ENGAGE IN MODERATE TO STRENUOUS EXERCISE (LIKE A BRISK WALK)?: 6 DAYS

## 2025-02-04 SDOH — ECONOMIC STABILITY: FOOD INSECURITY: WITHIN THE PAST 12 MONTHS, YOU WORRIED THAT YOUR FOOD WOULD RUN OUT BEFORE YOU GOT MONEY TO BUY MORE.: NEVER TRUE

## 2025-02-04 ASSESSMENT — SOCIAL DETERMINANTS OF HEALTH (SDOH)
IN THE PAST 12 MONTHS, HAS THE ELECTRIC, GAS, OIL, OR WATER COMPANY THREATENED TO SHUT OFF SERVICE IN YOUR HOME?: NO
HOW OFTEN DO YOU GET TOGETHER WITH FRIENDS OR RELATIVES?: MORE THAN THREE TIMES A WEEK
DO YOU BELONG TO ANY CLUBS OR ORGANIZATIONS SUCH AS CHURCH GROUPS UNIONS, FRATERNAL OR ATHLETIC GROUPS, OR SCHOOL GROUPS?: YES
HOW OFTEN DO YOU ATTEND CHURCH OR RELIGIOUS SERVICES?: MORE THAN 4 TIMES PER YEAR
DO YOU BELONG TO ANY CLUBS OR ORGANIZATIONS SUCH AS CHURCH GROUPS UNIONS, FRATERNAL OR ATHLETIC GROUPS, OR SCHOOL GROUPS?: YES
WITHIN THE PAST 12 MONTHS, YOU WORRIED THAT YOUR FOOD WOULD RUN OUT BEFORE YOU GOT THE MONEY TO BUY MORE: NEVER TRUE
HOW OFTEN DO YOU HAVE SIX OR MORE DRINKS ON ONE OCCASION: NEVER
HOW HARD IS IT FOR YOU TO PAY FOR THE VERY BASICS LIKE FOOD, HOUSING, MEDICAL CARE, AND HEATING?: NOT VERY HARD
IN A TYPICAL WEEK, HOW MANY TIMES DO YOU TALK ON THE PHONE WITH FAMILY, FRIENDS, OR NEIGHBORS?: MORE THAN THREE TIMES A WEEK
HOW OFTEN DO YOU HAVE A DRINK CONTAINING ALCOHOL: NEVER
HOW MANY DRINKS CONTAINING ALCOHOL DO YOU HAVE ON A TYPICAL DAY WHEN YOU ARE DRINKING: PATIENT DOES NOT DRINK
HOW OFTEN DO YOU ATTEND CHURCH OR RELIGIOUS SERVICES?: MORE THAN 4 TIMES PER YEAR
HOW OFTEN DO YOU GET TOGETHER WITH FRIENDS OR RELATIVES?: MORE THAN THREE TIMES A WEEK
IN A TYPICAL WEEK, HOW MANY TIMES DO YOU TALK ON THE PHONE WITH FAMILY, FRIENDS, OR NEIGHBORS?: MORE THAN THREE TIMES A WEEK
HOW OFTEN DO YOU ATTENT MEETINGS OF THE CLUB OR ORGANIZATION YOU BELONG TO?: MORE THAN 4 TIMES PER YEAR
HOW OFTEN DO YOU ATTENT MEETINGS OF THE CLUB OR ORGANIZATION YOU BELONG TO?: MORE THAN 4 TIMES PER YEAR

## 2025-02-04 ASSESSMENT — LIFESTYLE VARIABLES
SKIP TO QUESTIONS 9-10: 1
HOW OFTEN DO YOU HAVE A DRINK CONTAINING ALCOHOL: NEVER
AUDIT-C TOTAL SCORE: 0
HOW MANY STANDARD DRINKS CONTAINING ALCOHOL DO YOU HAVE ON A TYPICAL DAY: PATIENT DOES NOT DRINK
HOW OFTEN DO YOU HAVE SIX OR MORE DRINKS ON ONE OCCASION: NEVER

## 2025-02-06 ENCOUNTER — OFFICE VISIT (OUTPATIENT)
Dept: MEDICAL GROUP | Facility: PHYSICIAN GROUP | Age: 70
End: 2025-02-06
Payer: MEDICARE

## 2025-02-06 VITALS
WEIGHT: 278.66 LBS | OXYGEN SATURATION: 96 % | BODY MASS INDEX: 47.57 KG/M2 | HEART RATE: 70 BPM | HEIGHT: 64 IN | SYSTOLIC BLOOD PRESSURE: 134 MMHG | RESPIRATION RATE: 14 BRPM | DIASTOLIC BLOOD PRESSURE: 82 MMHG | TEMPERATURE: 97.2 F

## 2025-02-06 DIAGNOSIS — I10 PRIMARY HYPERTENSION: ICD-10-CM

## 2025-02-06 PROBLEM — M17.0 OSTEOARTHRITIS OF BOTH KNEES: Status: ACTIVE | Noted: 2023-03-09

## 2025-02-06 PROBLEM — R91.1 LUNG NODULE SEEN ON IMAGING STUDY: Status: ACTIVE | Noted: 2025-02-06

## 2025-02-06 PROCEDURE — 3079F DIAST BP 80-89 MM HG: CPT

## 2025-02-06 PROCEDURE — 99204 OFFICE O/P NEW MOD 45 MIN: CPT

## 2025-02-06 PROCEDURE — 3075F SYST BP GE 130 - 139MM HG: CPT

## 2025-02-06 ASSESSMENT — FIBROSIS 4 INDEX: FIB4 SCORE: 1.49

## 2025-02-06 ASSESSMENT — PATIENT HEALTH QUESTIONNAIRE - PHQ9: CLINICAL INTERPRETATION OF PHQ2 SCORE: 0

## 2025-02-06 ASSESSMENT — ENCOUNTER SYMPTOMS
BACK PAIN: 0
WEIGHT LOSS: 0
CONSTIPATION: 0
INSOMNIA: 0
COUGH: 0
DEPRESSION: 0
HEADACHES: 0
HEARTBURN: 0
DIARRHEA: 0

## 2025-02-06 NOTE — ASSESSMENT & PLAN NOTE
-Chronic, stable.  -Continue current regimen of triamterene-hydrochlorothiazide 37.5-25 mg daily  -Will refill hypertensive medication as needed.  -Labs per orders  Orders:    CBC WITH DIFFERENTIAL; Future    Lipid Profile; Future

## 2025-02-06 NOTE — PROGRESS NOTES
Subjective:     CC:  Diagnoses of Primary hypertension and Body mass index (BMI) of 45.0-49.9 in adult ([facility]) were pertinent to this visit.    HISTORY OF THE PRESENT ILLNESS: Patient is a 69 y.o. female. This pleasant patient is here today to establish care and discuss lab work and hypertensive medication refill.     Problem   Lung Nodule Seen On Imaging Study    Seen in  2021, evaluated with CT. 11 mm suspected granuloma, no further evaluation needed.     Osteoarthritis of Both Knees    Both knees, is committed to losing weight to prepare for surgery     Osteoarthritis of Right Shoulder    Is established with [facility], was instructed that surgery would not be an option until she weighed 230 lbs    Never had an injection. Recently completed physical therapy that was very helpful.     Food Allergy    Peanuts, tree nuts, and fish (fin and shell). Does have epi-pen available, practices avoidance effectively.     Multiple Thyroid Nodules    Decreasing in size, has discontinued sonograms.     Hyponatremia    Patient hangs out in low 130's, asymptomatic.     Vitamin D Deficiency    Is on supplementation. Vitamin D - 35 last value     Patrick (Obstructive Sleep Apnea)    Positional sleep apnea. Still on a CPAP. Still continues to see pulmonary and sleep doctor yearly.     Primary Hypertension    Taking triamterene-hctz which does well for her. Desiring to come off as she loses weight.      Hypothyroidism Due to Hashimoto's Thyroiditis    Just recently established with Dr. Macias, Is currently taking 112 mcg and has not had med adjustments in quuite some time.    >>OVERVIEW FOR GOITER WRITTEN ON 2/22/2010  9:13 PM BY HARLEEN ORTIZ M.D.    biopsy Hashimoto's thyroiditis         Health Maintenance: Completed    ROS:   Review of Systems   Constitutional:  Negative for weight loss.   Respiratory:  Negative for cough.    Cardiovascular:  Negative for leg swelling.   Gastrointestinal:  Negative for constipation, diarrhea and  "heartburn.   Genitourinary:  Negative for dysuria, frequency and urgency.   Musculoskeletal:  Negative for back pain.   Neurological:  Negative for headaches.   Psychiatric/Behavioral:  Negative for depression. The patient does not have insomnia.          Objective:     Exam: /82   Pulse 70   Temp 36.2 °C (97.2 °F) (Temporal)   Resp 14   Ht 1.626 m (5' 4\")   Wt (!) 126 kg (278 lb 10.6 oz)   SpO2 96%  Body mass index is 47.83 kg/m².    Physical Exam  Constitutional:       General: She is not in acute distress.     Appearance: Normal appearance. She is obese. She is not ill-appearing.   HENT:      Head: Normocephalic and atraumatic.      Right Ear: Tympanic membrane normal. There is no impacted cerumen.      Left Ear: Tympanic membrane normal. There is no impacted cerumen.      Nose: Nose normal.      Mouth/Throat:      Mouth: Mucous membranes are moist.      Pharynx: Oropharynx is clear. No posterior oropharyngeal erythema.   Eyes:      Extraocular Movements: Extraocular movements intact.      Conjunctiva/sclera: Conjunctivae normal.      Pupils: Pupils are equal, round, and reactive to light.   Cardiovascular:      Rate and Rhythm: Normal rate and regular rhythm.      Heart sounds: No murmur heard.  Pulmonary:      Effort: Pulmonary effort is normal.      Breath sounds: Normal breath sounds.   Abdominal:      General: Abdomen is flat. There is no distension.      Palpations: Abdomen is soft.      Tenderness: There is no abdominal tenderness.   Musculoskeletal:         General: Normal range of motion.      Cervical back: Normal range of motion.   Lymphadenopathy:      Cervical: No cervical adenopathy.   Skin:     General: Skin is warm and dry.      Capillary Refill: Capillary refill takes less than 2 seconds.   Neurological:      General: No focal deficit present.      Mental Status: She is alert and oriented to person, place, and time.      Deep Tendon Reflexes: Reflexes normal.   Psychiatric:         " Mood and Affect: Mood normal.             Assessment & Plan:   69 y.o. female with the following -    Assessment & Plan  Primary hypertension  -Chronic, stable.  -Continue current regimen of triamterene-hydrochlorothiazide 37.5-25 mg daily  -Will refill hypertensive medication as needed.  -Labs per orders  Orders:    CBC WITH DIFFERENTIAL; Future    Lipid Profile; Future    Body mass index (BMI) of 45.0-49.9 in adult ([facility])  -Chronic, ongoing slightly improved from November.  -Patient currently taking injectable semaglutide for weight loss in an effort to lose weight for orthopedic surgery at some point in the future.  -Discussed healthy lifestyle and dietary changes with the patient.  She has been going through a rough time last several years with some family issues and is now committed to making healthier choices as it relates to her weight.  -Will continue to monitor at subsequent visits         [facility] Gap Form    Diagnosis: E66.01 - Severe obesity ([facility])  Z68.42 - Body mass index (BMI) of 45.0-49.9 in adult ([facility])  The current BMI is 47.83 kg/m² as of 02/06/25.    Past BMI Values:  02/06/25 : 47.83 kg/m². 11/05/24 : 50.13 kg/m². 09/30/24 : 49.78 kg/m².   Assessment and plan: Chronic, improving. Encouraged healthy diet and physical activity changes with a goal of weight loss. Follow up at least annually.  Last edited 02/06/25 08:28 PST by Simeon Pereira D.O.         Return in about 3 months (around 5/6/2025) for f/u [facility] Annual.    Please note that this dictation was created using voice recognition software. I have made every reasonable attempt to correct obvious errors, but I expect that there are errors of grammar and possibly content that I did not discover before finalizing the note.        Simeon Pereira D.O.

## 2025-03-10 ENCOUNTER — OFFICE VISIT (OUTPATIENT)
Dept: SLEEP MEDICINE | Facility: MEDICAL CENTER | Age: 70
End: 2025-03-10
Attending: PHYSICIAN ASSISTANT
Payer: MEDICARE

## 2025-03-10 VITALS
SYSTOLIC BLOOD PRESSURE: 122 MMHG | HEART RATE: 74 BPM | RESPIRATION RATE: 16 BRPM | BODY MASS INDEX: 47.29 KG/M2 | HEIGHT: 64 IN | OXYGEN SATURATION: 91 % | DIASTOLIC BLOOD PRESSURE: 68 MMHG | WEIGHT: 277 LBS

## 2025-03-10 DIAGNOSIS — E66.01 MORBID OBESITY WITH BODY MASS INDEX (BMI) OF 40.0 TO 49.9 (HCC): ICD-10-CM

## 2025-03-10 DIAGNOSIS — G47.33 OSA (OBSTRUCTIVE SLEEP APNEA): ICD-10-CM

## 2025-03-10 PROCEDURE — 3078F DIAST BP <80 MM HG: CPT | Performed by: PHYSICIAN ASSISTANT

## 2025-03-10 PROCEDURE — 99213 OFFICE O/P EST LOW 20 MIN: CPT | Performed by: PHYSICIAN ASSISTANT

## 2025-03-10 PROCEDURE — 3074F SYST BP LT 130 MM HG: CPT | Performed by: PHYSICIAN ASSISTANT

## 2025-03-10 ASSESSMENT — ENCOUNTER SYMPTOMS
COUGH: 0
INSOMNIA: 1
SINUS PAIN: 0
SHORTNESS OF BREATH: 0
ROS GI COMMENTS: NO DENTURES, NO MISSING TEETH, NO SWALLOWING DIFFICULTY
HEADACHES: 0
PALPITATIONS: 0
SORE THROAT: 0
DIZZINESS: 0
FEVER: 0
SPUTUM PRODUCTION: 0
CHILLS: 0
HEARTBURN: 0
WHEEZING: 0
WEIGHT LOSS: 0
ORTHOPNEA: 0
TREMORS: 0

## 2025-03-10 ASSESSMENT — FIBROSIS 4 INDEX: FIB4 SCORE: 1.49

## 2025-03-10 NOTE — PATIENT INSTRUCTIONS
1-reviewed compliance   2-demonstrating use and benefit  3-new order for mask and supplies to [facility]  4-discussion regarding weight loss and use of medications  5-As a reminder use distilled water only in humidifier chamber.  Fresh fill daily  6-Today we reviewed equipment cleaning  once weekly minimum  mask, tubing and water chamber  use dedicated container  use mild soap and water  SoClean or other ozone  are not recommended  white vinegar and water solution is no longer recommended  hang tubing to dry  mask sanitizing wipes are an option for use   7-Equipment replacement schedule : Mask cushion every month, Head gear every 6 months, Tubing every 3 months, Ultra-fine filters 2 times per month, Humidifier chamber every 6 months   8-follow up in one year, sooner if needed

## 2025-03-10 NOTE — PROGRESS NOTES
"Chief Complaint   Patient presents with    Apnea     Last Office Visit 3/14/24 with Sveta Macdonald P.A.-C.    PAP/O2/OAT: auto CPAP @12-14 cm H20 pressure    Set up: 11/17/2023       HPI:  Adelaide Pereira is a 69 y.o. year old female here today for follow-up on obstructive sleep apnea. Last seen in clinic on 3/14/2024 by REED Crane.  Previously evaluated by Dr. Louis Sosa on 6/13/2022.    Past Medical History: RAISA on CPAP for many years, primary hypertension, hypothyroidism due to Hashimoto's thyroiditis, goiter, vitamin D deficiency, obesity.     Vitals:  /68 (BP Location: Right arm, Patient Position: Sitting, BP Cuff Size: Large adult)   Pulse 74   Resp 16   Ht 1.626 m (5' 4\")   Wt (!) 126 kg (277 lb)   SpO2 91% BMI of 47.55 kg/m².    Recent Imaging: None    Currently using  Resmed auto CPAP @12-14 cm H20 pressure; compliance reviewed for 2/7/2025 through 3/8/2025, days used 30/30, average daily usage 30/30, 100% of days greater than or equal to 4 hours, mask leak at 14.8 LPM at 95th percentile, AHI 0.4 per hour.  See media for full report    Device obtained new device on 11/17/2023  DME provider [facility]  Mask interface nasal mask    Polysomnogram  obtained 10/17/2011 demonstrating severe RAISA with overall AHI 36.9/h increasing to 70/h during REM. Low O2 sat of 62%.       Sleep schedule goes to bed 9 PM, wakens 5 AM , and gets up during the night bathroom x 1 at 3 AM   Symptoms denies day time somnolence and denies morning headache      Stop Bang Score reported as 6 on 7/16/2023        Review of Systems   Constitutional:  Negative for chills, fever, malaise/fatigue and weight loss.   HENT:  Positive for congestion (sometimes, environmental allergies) and hearing loss (hearing aids). Negative for nosebleeds, sinus pain, sore throat and tinnitus.    Respiratory:  Negative for cough, sputum production, shortness of breath and wheezing.    Cardiovascular:  Positive for leg swelling. Negative " for chest pain, palpitations and orthopnea.   Gastrointestinal:  Negative for heartburn.        No dentures, no missing teeth, no swallowing difficulty    Neurological:  Negative for dizziness, tremors and headaches.   Psychiatric/Behavioral:  The patient has insomnia.        Past Medical History:   Diagnosis Date    Chickenpox     [place] measles     Hyperlipidemia     Hypothyroidism     Influenza     Mumps     Obesity     Sleep apnea 2011    on CPAP    thyroid nodules 2000    biopsy Hashimoto's thyroiditis / TPO ab neg       Past Surgical History:   Procedure Laterality Date    PRIMARY C SECTION         Family History   Problem Relation Age of Onset    Thyroid Mother     Cancer Mother 70 - 79        breast    Heart Attack Father     Sleep Apnea Father     Arterial Aneurysm Father     No Known Problems Sister     Sleep Apnea Brother     Cancer Maternal Aunt 70 - 79        breast    Diabetes Maternal Grandmother     Thyroid Daughter     No Known Problems Son        Social History     Socioeconomic History    Marital status:      Spouse name: Not on file    Number of children: Not on file    Years of education: Not on file    Highest education level: Master's degree (e.g., MA, MS, Manuel, MEd, MSW, KATHERINE)   Occupational History    Not on file   Tobacco Use    Smoking status: Never    Smokeless tobacco: Never   Vaping Use    Vaping status: Never Used   Substance and Sexual Activity    Alcohol use: Not Currently     Comment: Occasionally     Drug use: No    Sexual activity: Not Currently   Other Topics Concern    Not on file   Social History Narrative    Not on file     Social Drivers of Health     Financial Resource Strain: Low Risk  (2/4/2025)    Overall Financial Resource Strain (CARDIA)     Difficulty of Paying Living Expenses: Not very hard   Food Insecurity: No Food Insecurity (2/4/2025)    Hunger Vital Sign     Worried About Running Out of Food in the Last Year: Never true     Ran Out of Food in the Last Year:  Never true   Transportation Needs: No Transportation Needs (2/4/2025)    PRAPARE - Transportation     Lack of Transportation (Medical): No     Lack of Transportation (Non-Medical): No   Physical Activity: Sufficiently Active (2/4/2025)    Exercise Vital Sign     Days of Exercise per Week: 6 days     Minutes of Exercise per Session: 60 min   Stress: Stress Concern Present (2/4/2025)    [place] [employer] - Occupational Stress Questionnaire     Feeling of Stress : To some extent   Social Connections: Moderately Integrated (2/4/2025)    Social Connection and Isolation Panel [NHANES]     Frequency of Communication with Friends and Family: More than three times a week     Frequency of Social Gatherings with Friends and Family: More than three times a week     Attends Restoration Services: More than 4 times per year     Active Member of Clubs or Organizations: Yes     Attends Club or Organization Meetings: More than 4 times per year     Marital Status:    Intimate Partner Violence: Not on file   Housing Stability: Low Risk  (2/4/2025)    Housing Stability Vital Sign     Unable to Pay for Housing in the Last Year: No     Number of Times Moved in the Last Year: 0     Homeless in the Last Year: No       Allergies as of 03/10/2025 - Reviewed 03/10/2025   Allergen Reaction Noted    Nitrofurantoin Rash 06/22/2020    Aspirin Unspecified 06/24/2017    Codeine  06/24/2017    Fish  06/24/2017    Maxepa  03/29/2010    Peanut oil  03/29/2010    Tree nuts food allergy Anaphylaxis 10/21/2021          Current medications as of today   Current Outpatient Medications   Medication Sig Dispense Refill    Semaglutide (WEGOVY) 2.4 MG/0.75ML Solution Auto-injector Pen-injector Inject 2.4 mg under the skin every 7 days.      triamterene-hctz (MAXZIDE-25/DYAZIDE) 37.5-25 MG Tab TAKE ONE-HALF TABLET BY MOUTH EVERY DAY. Needs an appointment with new PCP before further refills 100 Tablet 0    levothyroxine (SYNTHROID) 112  MCG Tab TAKE 1 TABLET BY MOUTH EVERY MORNING ON AN EMPTY STOMACH 100 Tablet 3    triamcinolone acetonide (KENALOG) 0.1 % Cream APPLY A THIN LAYER TOPICALLY TWICE DAILY TO AFFECTED AREAS ON STOMACH FOR 1-2 WEEKS WITH FLARES      EPINEPHrine (EPIPEN) 0.3 MG/0.3ML Solution Auto-injector solution for injection INJECT INTO THIGH UTD 1 Each 0    Multiple Vitamins-Minerals (PRESERVISION AREDS PO) Take  by mouth.      Coenzyme Q10 (CO Q 10 PO) Take  by mouth.      Fexofenadine HCl (ALLEGRA ALLERGY PO) Take  by mouth.      ELDERBERRY PO Take  by mouth.      vitamin D, Ergocalciferol, (DRISDOL) 1.25 MG (81453 UT) Cap capsule Take  by mouth every 7 days.      Flaxseed, Linseed, (FLAX SEED OIL PO) Take  by mouth.      Probiotic Product (PROBIOTIC-10 PO) Take  by mouth.       No current facility-administered medications for this visit.         Physical Exam:   Gen:           Alert and oriented, No apparent distress. Mood and affect appropriate, normal interaction with examiner.   Hearing:     Grossly intact.  Nose:          Normal, no lesions or deformities.  Dentition:    Fair dentition.   Oropharynx:   Tongue normal, posterior pharynx without erythema or exudate.  Mallampati Classification: III  Neck:        Supple, trachea midline, no masses.  Respiratory Effort: No intercostal retractions or use of accessory muscles.   Gait and Station: Grossly normal.  Digits and Nails: No clubbing, cyanosis, petechiae, or nodes.   Skin:        No rashes, lesions or ulcers noted.               Ext:           No cyanosis or edema.      Immunizations:  Flu: Recommended  Pneumovax 23: 7/8/2020  Prevnar 13: 5/15/2019  SARS CoV2 Vaccine: Recommended    Assessment / Plan:  1. RAISA (obstructive sleep apnea)  - DME Mask and Supplies    Reviewed compliance, demonstrating use and benefit.  Send updated order for mask and supplies to [facility].  Reminded to use distilled water only in humidifier chamber.  Reviewed equipment cleaning and equipment  replacement schedule.  Follow-up in 1 year.    2. Morbid obesity with body mass index (BMI) of 40.0 to 49.9 (HCC)    Elevated BMI: Discussion regarding impact central adiposity on pulmonary function.  Encouraged to increase activity as tolerated and monitor nutritional intake.  Discussion regarding current weight loss medications, patient's questions answered.  Follow-up with primary.      Follow-up:   Return in about 1 year (around 3/10/2026) for Return with Sveta Macdonald PA-C.    Please note that this dictation was created using voice recognition software. I have made every reasonable attempt to correct obvious errors, but it is possible there are errors of grammar and possibly content that I did not discover before finalizing the note.

## 2025-03-17 ENCOUNTER — HOSPITAL ENCOUNTER (OUTPATIENT)
Dept: LAB | Facility: MEDICAL CENTER | Age: 70
End: 2025-03-17
Payer: MEDICARE

## 2025-03-17 ENCOUNTER — HOSPITAL ENCOUNTER (OUTPATIENT)
Dept: LAB | Facility: MEDICAL CENTER | Age: 70
End: 2025-03-17
Attending: INTERNAL MEDICINE
Payer: MEDICARE

## 2025-03-17 DIAGNOSIS — E04.2 MULTIPLE THYROID NODULES: ICD-10-CM

## 2025-03-17 DIAGNOSIS — E55.9 VITAMIN D DEFICIENCY: ICD-10-CM

## 2025-03-17 DIAGNOSIS — I10 PRIMARY HYPERTENSION: ICD-10-CM

## 2025-03-17 DIAGNOSIS — E66.01 MORBID OBESITY WITH BODY MASS INDEX (BMI) OF 40.0 TO 44.9 IN ADULT (HCC): ICD-10-CM

## 2025-03-17 DIAGNOSIS — E03.9 PRIMARY HYPOTHYROIDISM: ICD-10-CM

## 2025-03-17 DIAGNOSIS — E06.3 HASHIMOTO'S DISEASE: ICD-10-CM

## 2025-03-17 LAB
25(OH)D3 SERPL-MCNC: 38 NG/ML (ref 30–100)
ALBUMIN SERPL BCP-MCNC: 4.2 G/DL (ref 3.2–4.9)
ALBUMIN/GLOB SERPL: 1.4 G/DL
ALP SERPL-CCNC: 104 U/L (ref 30–99)
ALT SERPL-CCNC: 27 U/L (ref 2–50)
ANION GAP SERPL CALC-SCNC: 11 MMOL/L (ref 7–16)
AST SERPL-CCNC: 30 U/L (ref 12–45)
BASOPHILS # BLD AUTO: 0.5 % (ref 0–1.8)
BASOPHILS # BLD: 0.03 K/UL (ref 0–0.12)
BILIRUB SERPL-MCNC: 0.6 MG/DL (ref 0.1–1.5)
BUN SERPL-MCNC: 10 MG/DL (ref 8–22)
CALCIUM ALBUM COR SERPL-MCNC: 9.2 MG/DL (ref 8.5–10.5)
CALCIUM SERPL-MCNC: 9.4 MG/DL (ref 8.5–10.5)
CHLORIDE SERPL-SCNC: 97 MMOL/L (ref 96–112)
CHOLEST SERPL-MCNC: 160 MG/DL (ref 100–199)
CO2 SERPL-SCNC: 26 MMOL/L (ref 20–33)
CREAT SERPL-MCNC: 0.55 MG/DL (ref 0.5–1.4)
EOSINOPHIL # BLD AUTO: 0.07 K/UL (ref 0–0.51)
EOSINOPHIL NFR BLD: 1.2 % (ref 0–6.9)
ERYTHROCYTE [DISTWIDTH] IN BLOOD BY AUTOMATED COUNT: 49.4 FL (ref 35.9–50)
FASTING STATUS PATIENT QL REPORTED: NORMAL
GFR SERPLBLD CREATININE-BSD FMLA CKD-EPI: 98 ML/MIN/1.73 M 2
GLOBULIN SER CALC-MCNC: 3.1 G/DL (ref 1.9–3.5)
GLUCOSE SERPL-MCNC: 78 MG/DL (ref 65–99)
HCT VFR BLD AUTO: 46.1 % (ref 37–47)
HDLC SERPL-MCNC: 63 MG/DL
HGB BLD-MCNC: 14.8 G/DL (ref 12–16)
IMM GRANULOCYTES # BLD AUTO: 0.02 K/UL (ref 0–0.11)
IMM GRANULOCYTES NFR BLD AUTO: 0.3 % (ref 0–0.9)
LDLC SERPL CALC-MCNC: 84 MG/DL
LYMPHOCYTES # BLD AUTO: 1.76 K/UL (ref 1–4.8)
LYMPHOCYTES NFR BLD: 29.3 % (ref 22–41)
MCH RBC QN AUTO: 30.2 PG (ref 27–33)
MCHC RBC AUTO-ENTMCNC: 32.1 G/DL (ref 32.2–35.5)
MCV RBC AUTO: 94.1 FL (ref 81.4–97.8)
MONOCYTES # BLD AUTO: 0.5 K/UL (ref 0–0.85)
MONOCYTES NFR BLD AUTO: 8.3 % (ref 0–13.4)
NEUTROPHILS # BLD AUTO: 3.63 K/UL (ref 1.82–7.42)
NEUTROPHILS NFR BLD: 60.4 % (ref 44–72)
NRBC # BLD AUTO: 0 K/UL
NRBC BLD-RTO: 0 /100 WBC (ref 0–0.2)
PLATELET # BLD AUTO: 260 K/UL (ref 164–446)
PMV BLD AUTO: 11 FL (ref 9–12.9)
POTASSIUM SERPL-SCNC: 4.1 MMOL/L (ref 3.6–5.5)
PROT SERPL-MCNC: 7.3 G/DL (ref 6–8.2)
RBC # BLD AUTO: 4.9 M/UL (ref 4.2–5.4)
SODIUM SERPL-SCNC: 134 MMOL/L (ref 135–145)
T4 FREE SERPL-MCNC: 1.64 NG/DL (ref 0.93–1.7)
TRIGL SERPL-MCNC: 63 MG/DL (ref 0–149)
TSH SERPL-ACNC: 0.73 UIU/ML (ref 0.35–5.5)
WBC # BLD AUTO: 6 K/UL (ref 4.8–10.8)

## 2025-03-17 PROCEDURE — 84443 ASSAY THYROID STIM HORMONE: CPT

## 2025-03-17 PROCEDURE — 84439 ASSAY OF FREE THYROXINE: CPT

## 2025-03-17 PROCEDURE — 80053 COMPREHEN METABOLIC PANEL: CPT

## 2025-03-17 PROCEDURE — 36415 COLL VENOUS BLD VENIPUNCTURE: CPT

## 2025-03-17 PROCEDURE — 82306 VITAMIN D 25 HYDROXY: CPT

## 2025-03-17 PROCEDURE — 85025 COMPLETE CBC W/AUTO DIFF WBC: CPT

## 2025-03-17 PROCEDURE — 80061 LIPID PANEL: CPT

## 2025-03-19 ENCOUNTER — RESULTS FOLLOW-UP (OUTPATIENT)
Dept: MEDICAL GROUP | Facility: PHYSICIAN GROUP | Age: 70
End: 2025-03-19

## 2025-03-27 ENCOUNTER — APPOINTMENT (OUTPATIENT)
Dept: URBAN - METROPOLITAN AREA CLINIC 35 | Facility: CLINIC | Age: 70
Setting detail: DERMATOLOGY
End: 2025-03-27

## 2025-03-27 DIAGNOSIS — Z41.9 ENCOUNTER FOR PROCEDURE FOR PURPOSES OTHER THAN REMEDYING HEALTH STATE, UNSPECIFIED: ICD-10-CM

## 2025-03-27 PROCEDURE — ? FILLERS

## 2025-03-27 NOTE — PROCEDURE: FILLERS
Nasolabial Folds Filler Volume In Cc: 0
Include Cannula Size?: 25G
Post-Care Instructions: Patient instructed to apply ice to reduce swelling. Patient is very pleased with her results.
Additional Area 4 Location: oral commissure
Detail Level: Detailed
Include Cannula Information In Note?: No
Additional Area 2 Location: upper lip
Include Cannula Length?: 1.5 inch
Anesthesia Volume In Cc: 0.5
Lot #: [id number]
Additional Area 3 Location: chin
Marionette Lines Filler Volume In Cc: 1
Price (Use Numbers Only, No Special Characters Or $): 2110.00
Additional Area 5 Location: ear lobe
Expiration Date (Month Year): 4/21/2026
Lot #: [id number]
Use Map Statement For Sites (Optional): Yes
Expiration Date (Month Year): 1/6/2026
Map Statment: See Attach Map for Complete Details
Lot #: [id number]
Additional Area 1 Location: lips
Filler Comments: Skin cleansed with levacin prior to injections, ice pack applied following. Skin massaged capillary refill less then 1 second
Include Cannula Brand?: DermaSculpt
Filler: Juvederm Ultra Plus XC
Expiration Date (Month Year): 4/2026
Filler: Juvederm Volbella XC
Additional Area 3 Location: preauricular
Consent: Written consent obtained. Risks include but not limited to bruising, beading, irregular texture, ulceration, infection, allergic reaction, scar formation, incomplete augmentation, temporary nature, procedural pain.
Additional Area 1 Location: body of lip
Aspiration Statement: Aspiration was performed prior to injecting site with filler.

## 2025-04-14 ENCOUNTER — APPOINTMENT (OUTPATIENT)
Dept: URBAN - METROPOLITAN AREA CLINIC 6 | Facility: CLINIC | Age: 70
Setting detail: DERMATOLOGY
End: 2025-04-14

## 2025-04-14 DIAGNOSIS — Z41.9 ENCOUNTER FOR PROCEDURE FOR PURPOSES OTHER THAN REMEDYING HEALTH STATE, UNSPECIFIED: ICD-10-CM

## 2025-04-14 PROCEDURE — ? FILLERS

## 2025-04-14 NOTE — PROCEDURE: FILLERS
Additional Area 4 Location: oral commissure
Additional Area 1 Volume In Cc: 0
Additional Area 1 Location: body of lip
Include Cannula Brand?: DermaSculpt
Price (Use Numbers Only, No Special Characters Or $): 840.00
Filler: Juvederm Voluma XC
Include Cannula Information In Note?: No
Additional Area 5 Location: ear lobe
Additional Area 2 Location: upper lip
Cheeks Filler Volume In Cc: 1
Lot #: [id number]
Use Map Statement For Sites (Optional): Yes
Lot #: [id number]
Include Cannula Size?: 25G
Additional Area 3 Location: chin
Expiration Date (Month Year): 8/17/2025
Expiration Date (Month Year): 4/21/2036
Map Statment: See Attach Map for Complete Details
Include Cannula Length?: 1.5 inch
Consent: Written consent obtained. Risks include but not limited to bruising, beading, irregular texture, ulceration, infection, allergic reaction, scar formation, incomplete augmentation, temporary nature, procedural pain.
Anesthesia Volume In Cc: 0.5
Additional Area 1 Location: lips
Aspiration Statement: Aspiration was performed prior to injecting site with filler.
Post-Care Instructions: Patient instructed to apply ice to reduce swelling. Patient is very pleased with her results.
Lot #: [id number]
Expiration Date (Month Year): 12/29/2025
Additional Area 3 Location: preauricular
Detail Level: Detailed

## 2025-04-15 ENCOUNTER — OFFICE VISIT (OUTPATIENT)
Dept: ENDOCRINOLOGY | Facility: MEDICAL CENTER | Age: 70
End: 2025-04-15
Attending: INTERNAL MEDICINE
Payer: MEDICARE

## 2025-04-15 VITALS
HEIGHT: 64 IN | BODY MASS INDEX: 46.61 KG/M2 | OXYGEN SATURATION: 94 % | SYSTOLIC BLOOD PRESSURE: 132 MMHG | DIASTOLIC BLOOD PRESSURE: 86 MMHG | WEIGHT: 273 LBS | HEART RATE: 82 BPM

## 2025-04-15 DIAGNOSIS — E04.2 MULTIPLE THYROID NODULES: ICD-10-CM

## 2025-04-15 DIAGNOSIS — E03.9 PRIMARY HYPOTHYROIDISM: ICD-10-CM

## 2025-04-15 DIAGNOSIS — E66.01 MORBID OBESITY WITH BODY MASS INDEX (BMI) OF 40.0 TO 44.9 IN ADULT (HCC): ICD-10-CM

## 2025-04-15 DIAGNOSIS — E55.9 VITAMIN D DEFICIENCY: ICD-10-CM

## 2025-04-15 DIAGNOSIS — M17.0 PRIMARY OSTEOARTHRITIS OF BOTH KNEES: ICD-10-CM

## 2025-04-15 DIAGNOSIS — E06.3 HASHIMOTO'S DISEASE: ICD-10-CM

## 2025-04-15 PROCEDURE — 3079F DIAST BP 80-89 MM HG: CPT | Performed by: INTERNAL MEDICINE

## 2025-04-15 PROCEDURE — 3075F SYST BP GE 130 - 139MM HG: CPT | Performed by: INTERNAL MEDICINE

## 2025-04-15 PROCEDURE — 99211 OFF/OP EST MAY X REQ PHY/QHP: CPT | Performed by: INTERNAL MEDICINE

## 2025-04-15 PROCEDURE — 99215 OFFICE O/P EST HI 40 MIN: CPT | Performed by: INTERNAL MEDICINE

## 2025-04-15 RX ORDER — LEVOTHYROXINE SODIUM 112 UG/1
112 TABLET ORAL
Qty: 100 TABLET | Refills: 3 | Status: SHIPPED | OUTPATIENT
Start: 2025-04-15

## 2025-04-15 ASSESSMENT — FIBROSIS 4 INDEX: FIB4 SCORE: 1.58

## 2025-04-15 NOTE — PROGRESS NOTES
Chief Complaint: Follow up for Primary Hypothyroidism    HPI:     Adelaide Pereira is a 69 y.o. female here for follow up of Primary Hypothyroidism.        Since last visit patient reports feeling fair.  She remains on Levothyroxine 112 MCG daily which has been her dose for the past 12 months.   She reports excellent compliance and denies missing any daily doses.   She takes thyroid hormone prior to breakfast.   She  denies taking any iron, calcium supplements or antacids.      She has obese with a BMI of 46.86 kg/m2  She is on weight watchers  She has knee OA  She needs to lost weight ( BMI of 45 or less) before she can have knee replacement    She is taking compounded semaglutide 2.4 mg weekly from an outside pharmacy     Her labs are controlled   Latest Reference Range & Units 03/17/25 08:31   TSH 0.350 - 5.500 uIU/mL 0.732   Free T-4 0.93 - 1.70 ng/dL 1.64      Latest Reference Range & Units 03/17/25 08:31   25-Hydroxy   Vitamin D 25 30 - 100 ng/mL 38      Latest Reference Range & Units 03/17/25 08:31   Cholesterol,Tot 100 - 199 mg/dL 160   Triglycerides 0 - 149 mg/dL 63   HDL >=40 mg/dL 63   LDL <100 mg/dL 84          Patient's medications, allergies, and social histories were reviewed and updated as appropriate.      ROS:     CONS:     No fever, no chills   EYES:     No diplopia, no blurry vision   CV:           No chest pain, no palpitations   PULM:     No SOB, no cough, no hemoptysis.   GI:            No nausea, no vomiting, no diarrhea, no constipation   ENDO:     No polyuria, no polydipsia, no heat intolerance, no cold intolerance       Past Medical History:  Problem List:  2025-02: Lung nodule seen on imaging study  2023-03: Osteoarthritis of both knees  2021-12: Osteoarthritis of right shoulder  2020-07: Food allergy  2020-07: Multiple thyroid nodules  2020-07: Hyponatremia  2020-06: Binge eating  2018-06: Morbid obesity with body mass index (BMI) of 40.0 to 44.9 in   adult ([facility])  2012-02: Vitamin D  "deficiency  2010-02: Primary hypertension  2010-02: Hypothyroidism due to Hashimoto's thyroiditis  Obesity  RAISA (obstructive sleep apnea)  thyroid nodules      Past Surgical History:  Past Surgical History:   Procedure Laterality Date    PRIMARY C SECTION          Allergies:  Nitrofurantoin, Aspirin, Codeine, Fish, Maxepa, Peanut oil, and Tree nuts food allergy     Social History:  Social History     Tobacco Use    Smoking status: Never    Smokeless tobacco: Never   Vaping Use    Vaping status: Never Used   Substance Use Topics    Alcohol use: Not Currently     Comment: Occasionally     Drug use: No        Family History:   family history includes Arterial Aneurysm in her father; Cancer (age of onset: 70 - 79) in her maternal aunt and mother; Diabetes in her maternal grandmother; Heart Attack in her father; No Known Problems in her sister and son; Sleep Apnea in her brother and father; Thyroid in her daughter and mother.      PHYSICAL EXAM:   Vital signs: /86 (BP Location: Left arm, Patient Position: Sitting, BP Cuff Size: Adult long)   Pulse 82   Ht 1.626 m (5' 4\")   Wt 124 kg (273 lb)   SpO2 94%   BMI 46.86 kg/m²   GENERAL: Well-developed, well-nourished in no apparent distress.   EYE:  No ocular asymmetry, PERRLA  HENT: Pink, moist mucous membranes.    NECK: No thyromegaly.   CARDIOVASCULAR:  No murmurs  LUNGS: Clear breath sounds  ABDOMEN: Soft, nontender   EXTREMITIES: No clubbing, cyanosis, or edema.   NEUROLOGICAL: No gross focal motor abnormalities   LYMPH: No cervical adenopathy palpated.   SKIN: No rashes, lesions.       Labs:  Lab Results   Component Value Date/Time    SODIUM 134 (L) 03/17/2025 08:31 AM    POTASSIUM 4.1 03/17/2025 08:31 AM    CHLORIDE 97 03/17/2025 08:31 AM    CO2 26 03/17/2025 08:31 AM    ANION 11.0 03/17/2025 08:31 AM    GLUCOSE 78 03/17/2025 08:31 AM    BUN 10 03/17/2025 08:31 AM    CREATININE 0.55 03/17/2025 08:31 AM    CREATININE 0.59 02/08/2013 08:05 AM    CALCIUM 9.4 " "2025 08:31 AM    ASTSGOT 30 2025 08:31 AM    ALTSGPT 27 2025 08:31 AM    TBILIRUBIN 0.6 2025 08:31 AM    ALBUMIN 4.2 2025 08:31 AM    TOTPROTEIN 7.3 2025 08:31 AM    GLOBULIN 3.1 2025 08:31 AM    AGRATIO 1.4 2025 08:31 AM       Lab Results   Component Value Date/Time    SODIUM 134 (L) 2025 0831    POTASSIUM 4.1 2025 0831    CHLORIDE 97 2025 0831    CO2 26 2025 0831    GLUCOSE 78 2025 0831    BUN 10 2025 0831    CREATININE 0.55 2025 0831    CALCIUM 9.4 2025 0831    ANION 11.0 2025 0831       Lab Results   Component Value Date/Time    CHOLSTRLTOT 160 2025 0831    TRIGLYCERIDE 63 2025 0831    HDL 63 2025 0831    LDL 84 2025 0831    CHOLHDLRAT 3.6 2010 0930       Lab Results   Component Value Date/Time    TSHULTRASEN 0.732 2025 0831     Lab Results   Component Value Date/Time    FREET4 1.64 2025 0831     Lab Results   Component Value Date/Time    FREET3 2.19 2024 0841     No results found for: \"THYSTIMIG\"    Lab Results   Component Value Date/Time    MICROSOMALA <9.0 2020 0805         Imagin/10/2023 9:17 AM     HISTORY/REASON FOR EXAM:  Chronic thyroiditis, nodules.     TECHNIQUE/EXAM DESCRIPTION:  Ultrasound of the soft tissues of the head and neck.     COMPARISON:  2021 ultrasound of the soft tissues of the head and neck.     FINDINGS:  The thyroid gland is heterogeneous.  Vascularity is normal.     The right lobe of the thyroid gland measures 2.01 cm x 4.84 cm x 2.12 cm.  The left lobe of the thyroid gland measures 1.32 cm x 3.37 cm x 1.40 cm.  The isthmus measures 0.44 cm.     Nodules >= 1cm:     Nodule #1  Location:  Right  upper  Size:  0.6 x 0.6 x 0.3 cm  Composition:  Solid-2  Echogenicity:  Hypoechoic-2  Shape:  Wider than tall-0  Margins:  Smooth-0  Echogenic Foci:  None-0     ACR TIRADS points/category:  4 - TR4 - Moderately Suspicious     Nodule " #2  Location:  Right  lower  Size:  0.7 x 0.6 x 0.6 cm  Composition:  Solid-2  Echogenicity:  Hypoechoic-2  Shape:  Wider than tall-0  Margins:  Smooth-0  Echogenic Foci:  None-0     ACR TIRADS points/category:  4 - TR4 - Moderately Suspicious     Nodule #3  Location:  Left  mid  Size:  1.0 x 0.9 x 0.9 cm  Composition:  Solid-2  Echogenicity:  Isoechoic-1  Shape:  Wider than tall-0  Margins:  Smooth-0  Echogenic Foci:  None-0     ACR TIRADS points/category:  3 - TR3 - Mildly Suspicious     IMPRESSION:     Subcentimeter TR4 nodules in the right thyroid gland and a 1 cm TR3 nodule in the left thyroid gland.     ACR TI-RADS Recommendations  No FNA or follow up recommended.     Recommendations based on the American College of Radiology Thyroid imaging, reporting and Data System (TI-RADS) 2017.     INTERPRETING LOCATION:  [address], [place], [id number]    ASSESSMENT/PLAN:     1. Primary hypothyroidism  Controlled  Continue levothyroxine 112 mcg daily  Reviewed proper administration of thyroid hormone  Patient should take thyroid hormone 30-60 minutes before breakfast on an empty stomach plain water and not take it together with food, iron, calcium, and antacids.  Iron, calcium, and antacids should be taken at least 4 hours apart from thyroid hormone.  Repeat thyroid lab in 1 year    2. Hashimoto's disease  This is the etiology of her hypothyroidism    3. Multiple thyroid nodules  Stable she has multiple low risk small nodules measuring less than 1 cm.  Neck exam is unremarkable  Recommend repeating ultrasound as needed    4. Vitamin D deficiency  Stable   Vitamin D labs were reviewed with patient  Continue current supplements  Continue monitoring levels       5. Morbid obesity with body mass index (BMI) of 40.0 to 44.9 in adult ([facility])  Unstable  She has class III obesity with weight related complications  She is on compounded semaglutide  I did explain to her that Wegovy/semaglutide which is the same ingredient in Ozempic  is not available for cash pay at a lower price  She inform me that she would like to avail of this after she uses up all her compounded medication which will take approximately 7 weeks        6. Primary osteoarthritis of both knees  Unstable this is a weight related complication from her obesity she needs to lose weight before she can undergo surgery  Continue follow-up with orthopedics        Return in about 1 year (around 4/15/2026).        Total time spent on day of service was over 60 minutes which included obtaining a detailed history and physical exam, ordering labs, coordinating care and scheduling future follow-up    Thank you kindly for allowing me to participate in the thyroid care plan for this patient.    Jarrell Macias MD, FACE, ECNU      CC:   Simeon Pereira D.O.

## 2025-04-30 ENCOUNTER — APPOINTMENT (OUTPATIENT)
Dept: URBAN - METROPOLITAN AREA CLINIC 22 | Facility: CLINIC | Age: 70
Setting detail: DERMATOLOGY
End: 2025-04-30

## 2025-04-30 DIAGNOSIS — L57.0 ACTINIC KERATOSIS: ICD-10-CM

## 2025-04-30 PROBLEM — D48.5 NEOPLASM OF UNCERTAIN BEHAVIOR OF SKIN: Status: ACTIVE | Noted: 2025-04-30

## 2025-04-30 PROCEDURE — 11102 TANGNTL BX SKIN SINGLE LES: CPT

## 2025-04-30 PROCEDURE — ? BIOPSY BY SHAVE METHOD

## 2025-04-30 ASSESSMENT — LOCATION SIMPLE DESCRIPTION DERM: LOCATION SIMPLE: LEFT PRETIBIAL REGION

## 2025-04-30 ASSESSMENT — LOCATION ZONE DERM: LOCATION ZONE: LEG

## 2025-04-30 ASSESSMENT — LOCATION DETAILED DESCRIPTION DERM: LOCATION DETAILED: LEFT MEDIAL DISTAL PRETIBIAL REGION

## 2025-05-08 ENCOUNTER — OFFICE VISIT (OUTPATIENT)
Dept: MEDICAL GROUP | Facility: PHYSICIAN GROUP | Age: 70
End: 2025-05-08
Payer: MEDICARE

## 2025-05-08 VITALS
HEART RATE: 69 BPM | SYSTOLIC BLOOD PRESSURE: 130 MMHG | TEMPERATURE: 97.1 F | WEIGHT: 270.9 LBS | DIASTOLIC BLOOD PRESSURE: 90 MMHG | HEIGHT: 64 IN | OXYGEN SATURATION: 94 % | BODY MASS INDEX: 46.25 KG/M2

## 2025-05-08 DIAGNOSIS — I10 PRIMARY HYPERTENSION: ICD-10-CM

## 2025-05-08 DIAGNOSIS — E66.01 MORBID OBESITY WITH BMI OF 45.0-49.9, ADULT (HCC): ICD-10-CM

## 2025-05-08 DIAGNOSIS — Z00.00 ANNUAL WELLNESS VISIT: ICD-10-CM

## 2025-05-08 PROCEDURE — 3080F DIAST BP >= 90 MM HG: CPT

## 2025-05-08 PROCEDURE — G0438 PPPS, INITIAL VISIT: HCPCS

## 2025-05-08 PROCEDURE — 3075F SYST BP GE 130 - 139MM HG: CPT

## 2025-05-08 RX ORDER — TRIAMTERENE AND HYDROCHLOROTHIAZIDE 37.5; 25 MG/1; MG/1
TABLET ORAL
Qty: 100 TABLET | Refills: 5 | Status: SHIPPED | OUTPATIENT
Start: 2025-05-08

## 2025-05-08 ASSESSMENT — PATIENT HEALTH QUESTIONNAIRE - PHQ9: CLINICAL INTERPRETATION OF PHQ2 SCORE: 0

## 2025-05-08 ASSESSMENT — ACTIVITIES OF DAILY LIVING (ADL): BATHING_REQUIRES_ASSISTANCE: 0

## 2025-05-08 ASSESSMENT — FIBROSIS 4 INDEX: FIB4 SCORE: 1.58

## 2025-05-08 ASSESSMENT — ENCOUNTER SYMPTOMS: GENERAL WELL-BEING: GOOD

## 2025-05-08 NOTE — PROGRESS NOTES
Chief Complaint   Patient presents with    Annual Wellness Visit       HPI:  Adelaide Pereira is a 69 y.o. here for Medicare Annual Wellness Visit     Patient Active Problem List    Diagnosis Date Noted    Primary hypothyroidism 04/15/2025    Lung nodule seen on imaging study 02/06/2025    Osteoarthritis of both knees 03/09/2023    Osteoarthritis of right shoulder 12/21/2021    Food allergy 07/08/2020    Multiple thyroid nodules 07/08/2020    Hyponatremia 07/07/2020    Binge eating 06/22/2020    Morbid obesity with body mass index (BMI) of 40.0 to 44.9 in adult ([facility]) 06/28/2018    Vitamin D deficiency 02/16/2012    RAISA (obstructive sleep apnea)     Primary hypertension 02/22/2010    Hypothyroidism due to Hashimoto's thyroiditis 02/22/2010       Current Outpatient Medications   Medication Sig Dispense Refill    levothyroxine (SYNTHROID) 112 MCG Tab Take 1 Tablet by mouth every morning on an empty stomach. 100 Tablet 3    Semaglutide (WEGOVY) 2.4 MG/0.75ML Solution Auto-injector Pen-injector Inject 2.4 mg under the skin every 7 days.      triamterene-hctz (MAXZIDE-25/DYAZIDE) 37.5-25 MG Tab TAKE ONE-HALF TABLET BY MOUTH EVERY DAY. Needs an appointment with new PCP before further refills 100 Tablet 0    triamcinolone acetonide (KENALOG) 0.1 % Cream APPLY A THIN LAYER TOPICALLY TWICE DAILY TO AFFECTED AREAS ON STOMACH FOR 1-2 WEEKS WITH FLARES      EPINEPHrine (EPIPEN) 0.3 MG/0.3ML Solution Auto-injector solution for injection INJECT INTO THIGH UTD 1 Each 0    Multiple Vitamins-Minerals (PRESERVISION AREDS PO) Take  by mouth.      Coenzyme Q10 (CO Q 10 PO) Take  by mouth.      Fexofenadine HCl (ALLEGRA ALLERGY PO) Take  by mouth.      ELDERBERRY PO Take  by mouth.      Flaxseed, Linseed, (FLAX SEED OIL PO) Take  by mouth.      Probiotic Product (PROBIOTIC-10 PO) Take  by mouth.       No current facility-administered medications for this visit.          Current supplements as per medication list.     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 Regularly interacts with other family and friends     She  reports that she has never smoked. She has never used smokeless tobacco. She reports that she does not currently use alcohol. She reports that she does not use drugs.  Counseling given: Not Answered      ROS:    Gait: Uses no assistive device  Ostomy: No  Other tubes: No  Amputations: No  Chronic oxygen use: No  Last eye exam: July 2024  Wears hearing aids: Yes. Only has 10 % loss in both ears  : Denies any urinary leakage during the last 6 months    Screening:  UTD  Depression Screening  Little interest or pleasure in doing things?  0 - not at all  Feeling down, depressed , or hopeless? 0 - not at all  Patient Health Questionnaire Score: 0     If depressive symptoms identified deferred to follow up visit unless specifically addressed in assessment and plan.    Interpretation of PHQ-9 Total Score   Score Severity   1-4 No Depression   5-9 Mild Depression   10-14 Moderate Depression   15-19 Moderately Severe Depression   20-27 Severe Depression    Screening for Cognitive Impairment  Do you or any of your friends or family members have any concern about your memory? No  Three Minute Recall (Village, Kitchen, Baby) 2/3    [place] clock face with all 12 numbers and set the hands to show 10 minutes past 11.  Yes    Cognitive concerns identified deferred for follow up unless specifically addressed in assessment and plan.    Fall Risk Assessment  Has the patient had two or more falls in the last year or any fall with injury in the last year?  No    Safety Assessment  Do you always wear your seatbelt?  Yes  Any changes to home needed to function safely? No  Difficulty hearing.  No  Patient counseled about all safety risks that were identified.    Functional Assessment ADLs  Are there any  barriers preventing you from cooking for yourself or meeting nutritional needs?  No.    Are there any barriers preventing you from driving safely or obtaining transportation?  No.    Are there any barriers preventing you from using a telephone or calling for help?  No    Are there any barriers preventing you from shopping?  No.    Are there any barriers preventing you from taking care of your own finances?  No    Are there any barriers preventing you from managing your medications?  No    Are there any barriers preventing you from showering, bathing or dressing yourself? No    Are there any barriers preventing you from doing housework or laundry? No  Are there any barriers preventing you from using the toilet?No  Are you currently engaging in any exercise or physical activity?  Yes.      Self-Assessment of Health  What is your perception of your health? Good    Do you sleep more than six hours a night? Yes    In the past 7 days, how much did pain keep you from doing your normal work? Some    Do you spend quality time with family or friends (virtually or in person)? Yes    Do you usually eat a heart healthy diet that constists of a variety of fruits, vegetables, whole grains and fiber? Yes    Do you eat foods high in fat and/or Fast Food more than three times per week? No    How concerned are you that your medical conditions are not being well managed? a little    Are you worried that in the next 2 months, you may not have stable housing that you own, rent, or stay in as part of a household? No      Advance Care Planning  Do you have an Advance Directive, Living Will, Durable Power of , or POLST? Yes  Advance Directive Living Will Durable Power of  POLST is on file      Health Maintenance Summary            Current Care Gaps       Annual Wellness Visit (Yearly) Overdue since 4/28/2021 04/28/2020  Done - Pt Stated with Dr. Peña                      Needs Review       Hepatitis C Screening   Tentatively Complete      10/11/2022  Hepatitis C Antibody component of HEP C VIRUS ANTIBODY                      Upcoming       Zoster (Shingles) Vaccines (1 of 2) Postponed until 7/6/2025     No completion history exists for this topic.              Influenza Vaccine (Season Ended) Postponed until 2/6/2026     No completion history exists for this topic.              COVID-19 Vaccine (1 - 2024-25 season) Postponed until 2/6/2026     No completion history exists for this topic.              Bone Density Scan (Every 5 Years) Next due on 6/29/2025 06/29/2020  DS-BONE DENSITY STUDY (DEXA)              Mammogram (Yearly) Next due on 7/22/2025 07/22/2024  MA-SCREENING MAMMO BILAT W/TOMOSYNTHESIS W/CAD    07/20/2023  MA-SCREENING MAMMO BILAT W/TOMOSYNTHESIS W/CAD    07/18/2022  MA-SCREENING MAMMO BILAT W/TOMOSYNTHESIS W/CAD    07/09/2021  MA-DIAGNOSTIC MAMMO BILAT W/TOMOSYNTHESIS W/CAD    09/25/2020  MA-DIAGNOSTIC MAMMO LEFT W/TOMOSYNTHESIS W/O CAD     Only the first 5 history entries have been loaded, but more history exists.            IMM DTaP/Tdap/Td Vaccine (3 - Td or Tdap) Next due on 4/12/2026 04/12/2016  Imm Admin: Tdap Vaccine    03/12/2008  Imm Admin: Tdap Vaccine    06/29/1998  Imm Admin: TD Vaccine              Colorectal Cancer Screening (Colon Cancer Screening Cologuard Stool (FIT DNA) - Preferred) Next due on 6/13/2027 06/13/2024  COLOGUARD COLON CANCER SCREENING    01/01/2014  Colonoscopy (Done - Pt Stated)                      Completed or No Longer Recommended       Pneumococcal Vaccine: 50+ Years (Series Information) Completed      07/08/2020  Imm Admin: Pneumococcal polysaccharide vaccine (PPSV-23)    05/15/2019  Imm Admin: Pneumococcal Conjugate Vaccine (Prevnar/PCV-13)              Hepatitis A Vaccine (Hep A) (Series Information) Aged Out      No completion history exists for this topic.              Hepatitis B Vaccine (Hep B) (Series Information) Aged Out     No completion  "history exists for this topic.              HPV Vaccines (Series Information) Aged Out     No completion history exists for this topic.              Polio Vaccine (Inactivated Polio) (Series Information) Aged Out     No completion history exists for this topic.              Meningococcal Immunization (Series Information) Aged Out     No completion history exists for this topic.              Cervical Cancer Screening  Discontinued        Frequency changed to Never automatically (Topic No Longer Applies)    06/01/2020  Done - Pt Stated    11/10/2017  THINPREP PAP WITH HPV    11/10/2017  PATHOLOGY GYN SPECIMEN                            Patient Care Team:  Simeon Pereira D.O. as PCP - General (Family Medicine)  Ghassan Mooney M.D. (Inactive) as Consulting Physician (Endocrinology)  Vy Vazquez M.D. as Consulting Physician (Internal Medicine)  [employer] (DME Supplier)        Social History     Tobacco Use    Smoking status: Never    Smokeless tobacco: Never   Vaping Use    Vaping status: Never Used   Substance Use Topics    Alcohol use: Not Currently     Comment: Occasionally     Drug use: No     Family History   Problem Relation Age of Onset    Thyroid Mother     Cancer Mother 70 - 79        breast    Heart Attack Father     Sleep Apnea Father     Arterial Aneurysm Father     No Known Problems Sister     Sleep Apnea Brother     Cancer Maternal Aunt 70 - 79        breast    Diabetes Maternal Grandmother     Thyroid Daughter     No Known Problems Son      She  has a past medical history of Chickenpox, [place] measles, Hyperlipidemia, Hypothyroidism, Influenza, Mumps, Obesity, Sleep apnea (2011), and thyroid nodules (2000).   Past Surgical History:   Procedure Laterality Date    PRIMARY C SECTION         Exam:   BP (!) 130/90 (BP Location: Right arm, Patient Position: Sitting, BP Cuff Size: Large adult)   Pulse 69   Temp 36.2 °C (97.1 °F) (Temporal)   Ht 1.626 m (5' 4\")   Wt 123 kg (270 lb 14.4 oz)   " SpO2 94%  Body mass index is 46.5 kg/m².    Hearing good.    Dentition good  Alert, oriented in no acute distress.  Eye contact is good, speech goal directed, affect calm    Assessment and Plan. The following treatment and monitoring plan is recommended:  Refill of hypertension medications There are no diagnoses linked to this encounter.    Services suggested: No services needed at this time  Health Care Screening: Age-appropriate preventive services recommended by USPTF and ACIP covered by Medicare were discussed today. Services ordered if indicated and agreed upon by the patient.  Referrals offered: Community-based lifestyle interventions to reduce health risks and promote self-management and wellness, fall prevention, nutrition, physical activity, tobacco-use cessation, weight loss, and mental health services as per orders if indicated.    Discussion today about general wellness and lifestyle habits:    Prevent falls and reduce trip hazards; Cautioned about securing or removing rugs.  Have a working fire alarm and carbon monoxide detector;   Engage in regular physical activity and social activities     Follow-up: No follow-ups on file.

## 2025-06-18 ENCOUNTER — TELEPHONE (OUTPATIENT)
Dept: HEALTH INFORMATION MANAGEMENT | Facility: OTHER | Age: 70
End: 2025-06-18
Payer: MEDICARE

## 2025-07-08 ENCOUNTER — HOSPITAL ENCOUNTER (OUTPATIENT)
Dept: CARDIOLOGY | Facility: MEDICAL CENTER | Age: 70
End: 2025-07-08
Attending: ORTHOPAEDIC SURGERY
Payer: MEDICARE

## 2025-07-08 ENCOUNTER — HOSPITAL ENCOUNTER (OUTPATIENT)
Dept: RADIOLOGY | Facility: MEDICAL CENTER | Age: 70
End: 2025-07-08
Attending: ORTHOPAEDIC SURGERY
Payer: MEDICARE

## 2025-07-08 DIAGNOSIS — Z01.818 PREOP EXAMINATION: ICD-10-CM

## 2025-07-08 DIAGNOSIS — Z01.811 PRE-OPERATIVE RESPIRATORY EXAMINATION: ICD-10-CM

## 2025-07-08 DIAGNOSIS — R79.1 ABNORMAL COAGULATION PROFILE: ICD-10-CM

## 2025-07-08 DIAGNOSIS — Z01.812 PRE-OPERATIVE LABORATORY EXAMINATION: ICD-10-CM

## 2025-07-08 DIAGNOSIS — Z01.810 PRE-OPERATIVE CARDIOVASCULAR EXAMINATION: ICD-10-CM

## 2025-07-08 LAB — EKG IMPRESSION: NORMAL

## 2025-07-08 PROCEDURE — 71046 X-RAY EXAM CHEST 2 VIEWS: CPT

## 2025-07-08 PROCEDURE — 93005 ELECTROCARDIOGRAM TRACING: CPT | Mod: TC | Performed by: ORTHOPAEDIC SURGERY

## 2025-07-26 ENCOUNTER — HOSPITAL ENCOUNTER (OUTPATIENT)
Dept: LAB | Facility: MEDICAL CENTER | Age: 70
End: 2025-07-26
Attending: ORTHOPAEDIC SURGERY
Payer: MEDICARE

## 2025-07-26 LAB
ALBUMIN SERPL BCP-MCNC: 4.3 G/DL (ref 3.2–4.9)
ALBUMIN/GLOB SERPL: 1.3 G/DL
ALP SERPL-CCNC: 107 U/L (ref 30–99)
ALT SERPL-CCNC: 15 U/L (ref 2–50)
ANION GAP SERPL CALC-SCNC: 14 MMOL/L (ref 7–16)
APPEARANCE UR: CLEAR
AST SERPL-CCNC: 19 U/L (ref 12–45)
BACTERIA #/AREA URNS HPF: ABNORMAL /HPF
BASOPHILS # BLD AUTO: 0.7 % (ref 0–1.8)
BASOPHILS # BLD: 0.04 K/UL (ref 0–0.12)
BILIRUB SERPL-MCNC: 0.7 MG/DL (ref 0.1–1.5)
BILIRUB UR QL STRIP.AUTO: NEGATIVE
BUN SERPL-MCNC: 9 MG/DL (ref 8–22)
CALCIUM ALBUM COR SERPL-MCNC: 9.5 MG/DL (ref 8.5–10.5)
CALCIUM SERPL-MCNC: 9.7 MG/DL (ref 8.5–10.5)
CASTS URNS QL MICRO: ABNORMAL /LPF (ref 0–2)
CHLORIDE SERPL-SCNC: 95 MMOL/L (ref 96–112)
CO2 SERPL-SCNC: 24 MMOL/L (ref 20–33)
COLOR UR: YELLOW
CREAT SERPL-MCNC: 0.56 MG/DL (ref 0.5–1.4)
EOSINOPHIL # BLD AUTO: 0.04 K/UL (ref 0–0.51)
EOSINOPHIL NFR BLD: 0.7 % (ref 0–6.9)
EPITHELIAL CELLS 1715: ABNORMAL /HPF (ref 0–5)
ERYTHROCYTE [DISTWIDTH] IN BLOOD BY AUTOMATED COUNT: 47.1 FL (ref 35.9–50)
EST. AVERAGE GLUCOSE BLD GHB EST-MCNC: 103 MG/DL
GFR SERPLBLD CREATININE-BSD FMLA CKD-EPI: 97 ML/MIN/1.73 M 2
GLOBULIN SER CALC-MCNC: 3.3 G/DL (ref 1.9–3.5)
GLUCOSE SERPL-MCNC: 106 MG/DL (ref 65–99)
GLUCOSE UR STRIP.AUTO-MCNC: NEGATIVE MG/DL
HBA1C MFR BLD: 5.2 % (ref 4–5.6)
HCT VFR BLD AUTO: 45.2 % (ref 37–47)
HGB BLD-MCNC: 14.9 G/DL (ref 12–16)
IMM GRANULOCYTES # BLD AUTO: 0.03 K/UL (ref 0–0.11)
IMM GRANULOCYTES NFR BLD AUTO: 0.5 % (ref 0–0.9)
INR PPP: 1 (ref 0.87–1.13)
KETONES UR STRIP.AUTO-MCNC: ABNORMAL MG/DL
LEUKOCYTE ESTERASE UR QL STRIP.AUTO: ABNORMAL
LYMPHOCYTES # BLD AUTO: 1.69 K/UL (ref 1–4.8)
LYMPHOCYTES NFR BLD: 28 % (ref 22–41)
MCH RBC QN AUTO: 30.5 PG (ref 27–33)
MCHC RBC AUTO-ENTMCNC: 33 G/DL (ref 32.2–35.5)
MCV RBC AUTO: 92.4 FL (ref 81.4–97.8)
MICRO URNS: ABNORMAL
MONOCYTES # BLD AUTO: 0.61 K/UL (ref 0–0.85)
MONOCYTES NFR BLD AUTO: 10.1 % (ref 0–13.4)
NEUTROPHILS # BLD AUTO: 3.62 K/UL (ref 1.82–7.42)
NEUTROPHILS NFR BLD: 60 % (ref 44–72)
NITRITE UR QL STRIP.AUTO: NEGATIVE
NRBC # BLD AUTO: 0 K/UL
NRBC BLD-RTO: 0 /100 WBC (ref 0–0.2)
PH UR STRIP.AUTO: 8 [PH] (ref 5–8)
PLATELET # BLD AUTO: 270 K/UL (ref 164–446)
PMV BLD AUTO: 10.5 FL (ref 9–12.9)
POTASSIUM SERPL-SCNC: 4.3 MMOL/L (ref 3.6–5.5)
PROT SERPL-MCNC: 7.6 G/DL (ref 6–8.2)
PROT UR QL STRIP: NEGATIVE MG/DL
PROTHROMBIN TIME: 13.2 SEC (ref 12–14.6)
RBC # BLD AUTO: 4.89 M/UL (ref 4.2–5.4)
RBC # URNS HPF: ABNORMAL /HPF (ref 0–2)
RBC UR QL AUTO: NEGATIVE
SODIUM SERPL-SCNC: 133 MMOL/L (ref 135–145)
SP GR UR STRIP.AUTO: 1.02
UROBILINOGEN UR STRIP.AUTO-MCNC: 0.2 EU/DL
WBC # BLD AUTO: 6 K/UL (ref 4.8–10.8)
WBC #/AREA URNS HPF: ABNORMAL /HPF

## 2025-07-26 PROCEDURE — 85025 COMPLETE CBC W/AUTO DIFF WBC: CPT

## 2025-07-26 PROCEDURE — 81001 URINALYSIS AUTO W/SCOPE: CPT

## 2025-07-26 PROCEDURE — 80053 COMPREHEN METABOLIC PANEL: CPT

## 2025-07-26 PROCEDURE — 83036 HEMOGLOBIN GLYCOSYLATED A1C: CPT

## 2025-07-26 PROCEDURE — 36415 COLL VENOUS BLD VENIPUNCTURE: CPT

## 2025-07-26 PROCEDURE — 85610 PROTHROMBIN TIME: CPT

## 2025-07-28 ENCOUNTER — APPOINTMENT (OUTPATIENT)
Dept: MEDICAL GROUP | Facility: PHYSICIAN GROUP | Age: 70
End: 2025-07-28
Payer: MEDICARE

## 2025-07-28 VITALS
SYSTOLIC BLOOD PRESSURE: 130 MMHG | HEART RATE: 74 BPM | TEMPERATURE: 98.1 F | DIASTOLIC BLOOD PRESSURE: 84 MMHG | WEIGHT: 264 LBS | HEIGHT: 64 IN | BODY MASS INDEX: 45.07 KG/M2 | OXYGEN SATURATION: 97 %

## 2025-07-28 DIAGNOSIS — M25.562 CHRONIC PAIN OF LEFT KNEE: ICD-10-CM

## 2025-07-28 DIAGNOSIS — G89.29 CHRONIC PAIN OF LEFT KNEE: ICD-10-CM

## 2025-07-28 DIAGNOSIS — Z01.818 PREOPERATIVE CLEARANCE: Primary | ICD-10-CM

## 2025-07-28 ASSESSMENT — FIBROSIS 4 INDEX: FIB4 SCORE: 1.29

## 2025-07-28 NOTE — PROGRESS NOTES
Verbal consent was acquired by the patient to use [employer] ambient listening note generation during this visit Yes       REASON FOR VISIT: Pre-Op Consultation  Consultation Requested by: Dr. King  Procedure date and type: LTKA CTH on 8/11/2025  Inherent cardiac risk of procedure: Intermediate    History of Present Illness  Ms. Pereira is a pleasant 68 yo established patient of Dr. Pereira who presents for preoperative clearance for a left knee replacement surgery.    She is scheduled for a left knee replacement surgery on 08/11/2025, with all necessary preoperative tests already completed. She has requested printed copies of her chest x-ray, EKG, and lab results from 07/2025. Her medical history includes arthritis, which she attributes to her active lifestyle and sports participation. She is not currently on any aspirin or NSAIDs due to allergies but does take elderberry, which she plans to discontinue on 08/01/2025. Despite her knee condition, she is able to perform daily activities independently, using a walker for support. She can walk 1 to 2 blocks on level ground without difficulty but struggles with stairs due to her knee issues. Running short distances is not possible for her. She is capable of light household chores such as dusting and washing dishes, as well as moderate tasks like vacuuming and grocery shopping. She also engages in heavy work around the house, including scrubbing floors, lifting, moving furniture, and yard work. 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 Walking is another favorite activity of hers, although she has been advised to limit it to 70 miles per month. She reports no chest pain, shortness of breath, wheezing, leg swelling, fevers, chills, night sweats, dark-colored stools, cough, or cold symptoms. She does not have diabetes. She experiences leg swelling only in hot weather.    She has hypertension and is currently on the lowest dose of  "triamterene. 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    Occupations: Former teacher  Hobbies: Gardening, walking      Current chronic conditions: does not have any pertinent problems on file.    Past medical history:  has a past medical history of Chickenpox, Bengali measles, Hyperlipidemia, Hypothyroidism, Influenza, Morbid obesity with body mass index (BMI) of 40.0 to 44.9 in adult ([facility]) (06/28/2018), Mumps, Obesity, Sleep apnea (01/01/2011), and thyroid nodules (01/01/2000).. Negative for: CAD, SBE, CVA, TIA, DVT, PE, bleeding requiring transfusion, intubation.    Surgical and anesthetic history:  has a past surgical history that includes primary c section. Prior surgery without complication, bleeding, reaction to anesthetic, prolonged recovery    Habits: Social History[1]    Allergies: Nitrofurantoin, Aspirin, Codeine, Fish, Maxepa, Peanut oil, and Tree nuts food allergy No known allergy to Anesthetic, or Latex.       Current medicines: Current Medications[2]    Anticoagulant: N/A  Herbals: She takes Elderberry            Goel Activity Status Index:   METS: Moderate functional capacity  ASA Class: Mild disease    ROS: negative for: CP, SOB, DEVI, Orthopnea, wheezing, leg edema, polydipsia, polyuria, fevers, chills, sweats, cough, cold, congestion, abd pain, reflux, black or bloody stools, weight loss/gain.    PHYSICAL EXAMINATION:  VITAL SIGNS: /84 (BP Location: Left arm, Patient Position: Sitting, BP Cuff Size: Adult)   Pulse 74   Temp 36.7 °C (98.1 °F) (Temporal)   Ht 1.626 m (5' 4\")   Wt 120 kg (264 lb)   SpO2 97%  Body mass index is 45.32 kg/m².  HEENT: EOMI, PERRL. Oropharynx pink, moist. Normal airway. Neck supple, no cervical lymphadenopathy.  LUNGS: CTAB good excursion.   HEART: RRR no murmur.  ABDOMEN: soft, nondistended, nontender normal BS. No HSM.  LOWER EXTREMITIES: warm and well perfused with no edema.      Labs: See attached/per surgeon    Risk of " complications using [employer] Surgical Risk Calculator:      IMPRESSION:    1. Preoperative clearance (Primary)  2. Chronic pain of left knee  Chronic, stable. Patient is scheduled to have total left knee replacement on 8/11/25 by Dr. King. Patient is medically optimized for the procedure.    High risk medical conditions: negative for Cardiac, Pulmonary, Bleeding, Poor healing, Thrombosis, Debility    PLAN:  Chronic medical conditions: Stable and controlled. Continue current medicines.   Avoid drugs that potentiate bleeding as advised by surgeon  Discontinue all herbal supplements 2 weeks prior to surgery.  Need for SBE prophylaxis: Per surgeon  This patient is considered  moderate risk for cardiopulmonary complications for this planned surgery by the Edd index    Edd Index for assessing perioperative cardiovascular risk [Circulation 1999;100:1047]:   (one point each for * high-risk surgery [ intrathoracic, suprainguinal vascular, intraperitoneal ],* Hx ischemic heart dz, * Hx CHF, *Hx TIA or CVA, *IDDM, *Serum Cr >2.0)   Interpretation of risk: (Complication = MI, Pulm edema, v-fib or cardiac arrest, complete heart block)  Very Low: 0 points = 0.4 % complication. Low: 1 point = 0.9 %, Moderate: 2 points = 6.6 %, High: 3+ points = 11%.         [1]   Social History  Tobacco Use    Smoking status: Never    Smokeless tobacco: Never   Vaping Use    Vaping status: Never Used   Substance Use Topics    Alcohol use: Not Currently     Comment: Occasionally     Drug use: No   [2]   Current Outpatient Medications   Medication Sig Dispense Refill    triamterene-hctz (MAXZIDE-25/DYAZIDE) 37.5-25 MG Tab TAKE ONE-HALF TABLET BY MOUTH EVERY DAY. Needs an appointment with new PCP before further refills 100 Tablet 5    levothyroxine (SYNTHROID) 112 MCG Tab Take 1 Tablet by mouth every morning on an empty stomach. 100 Tablet 3    Semaglutide (WEGOVY) 2.4 MG/0.75ML Solution Auto-injector Pen-injector Inject 2.4 mg  under the skin every 7 days.      triamcinolone acetonide (KENALOG) 0.1 % Cream APPLY A THIN LAYER TOPICALLY TWICE DAILY TO AFFECTED AREAS ON STOMACH FOR 1-2 WEEKS WITH FLARES      EPINEPHrine (EPIPEN) 0.3 MG/0.3ML Solution Auto-injector solution for injection INJECT INTO THIGH UTD 1 Each 0    Multiple Vitamins-Minerals (PRESERVISION AREDS PO) Take  by mouth.      Coenzyme Q10 (CO Q 10 PO) Take  by mouth.      Fexofenadine HCl (ALLEGRA ALLERGY PO) Take  by mouth.      ELDERBERRY PO Take  by mouth.      Flaxseed, Linseed, (FLAX SEED OIL PO) Take  by mouth.      Probiotic Product (PROBIOTIC-10 PO) Take  by mouth.       No current facility-administered medications for this visit.

## 2025-07-29 ENCOUNTER — APPOINTMENT (OUTPATIENT)
Dept: RADIOLOGY | Facility: MEDICAL CENTER | Age: 70
End: 2025-07-29
Payer: MEDICARE

## 2025-07-29 DIAGNOSIS — Z12.31 VISIT FOR SCREENING MAMMOGRAM: ICD-10-CM

## 2025-07-29 PROCEDURE — 77063 BREAST TOMOSYNTHESIS BI: CPT
